# Patient Record
Sex: FEMALE | Race: WHITE | NOT HISPANIC OR LATINO | URBAN - METROPOLITAN AREA
[De-identification: names, ages, dates, MRNs, and addresses within clinical notes are randomized per-mention and may not be internally consistent; named-entity substitution may affect disease eponyms.]

---

## 2017-05-01 ENCOUNTER — OUTPATIENT (OUTPATIENT)
Dept: OUTPATIENT SERVICES | Facility: HOSPITAL | Age: 62
LOS: 1 days | Discharge: HOME | End: 2017-05-01
Payer: COMMERCIAL

## 2017-05-01 DIAGNOSIS — C50.419 MALIGNANT NEOPLASM OF UPPER-OUTER QUADRANT OF UNSPECIFIED FEMALE BREAST: ICD-10-CM

## 2017-05-01 DIAGNOSIS — Z98.890 OTHER SPECIFIED POSTPROCEDURAL STATES: Chronic | ICD-10-CM

## 2017-05-01 DIAGNOSIS — Z90.11 ACQUIRED ABSENCE OF RIGHT BREAST AND NIPPLE: Chronic | ICD-10-CM

## 2017-05-01 PROCEDURE — 93010 ELECTROCARDIOGRAM REPORT: CPT

## 2017-05-01 NOTE — CHART NOTE - NSCHARTNOTEFT_GEN_A_CORE
Ht 70 inches   wt 94.6 kg    BP left arm sitting 136/48  HR 68 bpm  Stefania 97.7F  RR 14/min  O2 sat 100% on RA

## 2017-05-01 NOTE — ASU PATIENT PROFILE, ADULT - PSH
H/O cardiac radiofrequency ablation    H/O right breast biopsy  2017  History of lumpectomy of right breast  benign -2003  S/P colonoscopy

## 2017-05-01 NOTE — ASU PATIENT PROFILE, ADULT - PMH
Breast lump  benign  Breast pain, right    Breast skin changes  right breast  Essential hypertension    Malignant neoplasm of right female breast, unspecified site of breast    Microscopic hematuria    Osteoarthritis  knees  Rosacea    SVT (supraventricular tachycardia)

## 2017-05-03 ENCOUNTER — RESULT REVIEW (OUTPATIENT)
Age: 62
End: 2017-05-03

## 2017-05-03 ENCOUNTER — OUTPATIENT (OUTPATIENT)
Dept: OUTPATIENT SERVICES | Facility: HOSPITAL | Age: 62
LOS: 1 days | Discharge: HOME | End: 2017-05-03
Payer: COMMERCIAL

## 2017-05-03 DIAGNOSIS — Z98.890 OTHER SPECIFIED POSTPROCEDURAL STATES: Chronic | ICD-10-CM

## 2017-05-03 DIAGNOSIS — Z90.11 ACQUIRED ABSENCE OF RIGHT BREAST AND NIPPLE: Chronic | ICD-10-CM

## 2017-05-03 DIAGNOSIS — C50.419 MALIGNANT NEOPLASM OF UPPER-OUTER QUADRANT OF UNSPECIFIED FEMALE BREAST: ICD-10-CM

## 2017-05-03 PROCEDURE — 88321 CONSLTJ&REPRT SLD PREP ELSWR: CPT

## 2017-05-03 RX ORDER — FAMOTIDINE 10 MG/ML
20 INJECTION INTRAVENOUS ONCE
Qty: 0 | Refills: 0 | Status: COMPLETED | OUTPATIENT
Start: 2017-05-04 | End: 2017-05-04

## 2017-05-03 RX ORDER — CELECOXIB 200 MG/1
200 CAPSULE ORAL ONCE
Qty: 0 | Refills: 0 | Status: DISCONTINUED | OUTPATIENT
Start: 2017-05-04 | End: 2017-05-04

## 2017-05-03 RX ORDER — ACETAMINOPHEN 500 MG
975 TABLET ORAL ONCE
Qty: 0 | Refills: 0 | Status: DISCONTINUED | OUTPATIENT
Start: 2017-05-04 | End: 2017-05-04

## 2017-05-03 RX ORDER — OXYCODONE HYDROCHLORIDE 5 MG/1
10 TABLET ORAL ONCE
Qty: 0 | Refills: 0 | Status: DISCONTINUED | OUTPATIENT
Start: 2017-05-04 | End: 2017-05-04

## 2017-05-03 RX ORDER — SODIUM CHLORIDE 9 MG/ML
3 INJECTION INTRAMUSCULAR; INTRAVENOUS; SUBCUTANEOUS EVERY 8 HOURS
Qty: 0 | Refills: 0 | Status: DISCONTINUED | OUTPATIENT
Start: 2017-05-04 | End: 2017-05-19

## 2017-05-04 ENCOUNTER — RESULT REVIEW (OUTPATIENT)
Age: 62
End: 2017-05-04

## 2017-05-04 ENCOUNTER — OUTPATIENT (OUTPATIENT)
Dept: OUTPATIENT SERVICES | Facility: HOSPITAL | Age: 62
LOS: 1 days | Discharge: HOME | End: 2017-05-04
Payer: COMMERCIAL

## 2017-05-04 VITALS
WEIGHT: 208.56 LBS | DIASTOLIC BLOOD PRESSURE: 68 MMHG | SYSTOLIC BLOOD PRESSURE: 138 MMHG | OXYGEN SATURATION: 98 % | RESPIRATION RATE: 15 BRPM | TEMPERATURE: 98 F | HEART RATE: 68 BPM

## 2017-05-04 VITALS
RESPIRATION RATE: 19 BRPM | DIASTOLIC BLOOD PRESSURE: 50 MMHG | HEART RATE: 75 BPM | SYSTOLIC BLOOD PRESSURE: 146 MMHG | OXYGEN SATURATION: 98 %

## 2017-05-04 DIAGNOSIS — Z98.890 OTHER SPECIFIED POSTPROCEDURAL STATES: Chronic | ICD-10-CM

## 2017-05-04 DIAGNOSIS — Z90.11 ACQUIRED ABSENCE OF RIGHT BREAST AND NIPPLE: Chronic | ICD-10-CM

## 2017-05-04 LAB — SURGICAL PATHOLOGY FINAL REPORT - CH: SIGNIFICANT CHANGE UP

## 2017-05-04 PROCEDURE — 88307 TISSUE EXAM BY PATHOLOGIST: CPT | Mod: 26

## 2017-05-04 PROCEDURE — 88341 IMHCHEM/IMCYTCHM EA ADD ANTB: CPT | Mod: 26,59

## 2017-05-04 PROCEDURE — 88360 TUMOR IMMUNOHISTOCHEM/MANUAL: CPT | Mod: 26

## 2017-05-04 PROCEDURE — 88342 IMHCHEM/IMCYTCHM 1ST ANTB: CPT | Mod: 26,59

## 2017-05-04 PROCEDURE — 88305 TISSUE EXAM BY PATHOLOGIST: CPT | Mod: 26

## 2017-05-04 PROCEDURE — 78195 LYMPH SYSTEM IMAGING: CPT | Mod: 26

## 2017-05-04 PROCEDURE — 88329 PATH CONSLTJ DRG SURG: CPT

## 2017-05-04 PROCEDURE — 76098 X-RAY EXAM SURGICAL SPECIMEN: CPT | Mod: 26

## 2017-05-04 PROCEDURE — 19281 PERQ DEVICE BREAST 1ST IMAG: CPT | Mod: RT

## 2017-05-04 RX ORDER — MEPERIDINE HYDROCHLORIDE 50 MG/ML
12.5 INJECTION INTRAMUSCULAR; INTRAVENOUS; SUBCUTANEOUS
Qty: 0 | Refills: 0 | Status: DISCONTINUED | OUTPATIENT
Start: 2017-05-04 | End: 2017-05-04

## 2017-05-04 RX ORDER — OXYCODONE HYDROCHLORIDE 5 MG/1
5 TABLET ORAL EVERY 4 HOURS
Qty: 0 | Refills: 0 | Status: DISCONTINUED | OUTPATIENT
Start: 2017-05-04 | End: 2017-05-04

## 2017-05-04 RX ORDER — FENTANYL CITRATE 50 UG/ML
50 INJECTION INTRAVENOUS
Qty: 0 | Refills: 0 | Status: DISCONTINUED | OUTPATIENT
Start: 2017-05-04 | End: 2017-05-04

## 2017-05-04 RX ORDER — ONDANSETRON 8 MG/1
4 TABLET, FILM COATED ORAL ONCE
Qty: 0 | Refills: 0 | Status: DISCONTINUED | OUTPATIENT
Start: 2017-05-04 | End: 2017-05-04

## 2017-05-04 RX ORDER — SODIUM CHLORIDE 9 MG/ML
1000 INJECTION INTRAMUSCULAR; INTRAVENOUS; SUBCUTANEOUS
Qty: 0 | Refills: 0 | Status: DISCONTINUED | OUTPATIENT
Start: 2017-05-04 | End: 2017-05-04

## 2017-05-04 RX ADMIN — FAMOTIDINE 20 MILLIGRAM(S): 10 INJECTION INTRAVENOUS at 13:28

## 2017-05-04 RX ADMIN — OXYCODONE HYDROCHLORIDE 10 MILLIGRAM(S): 5 TABLET ORAL at 13:28

## 2017-05-04 NOTE — ASU DISCHARGE PLAN (ADULT/PEDIATRIC). - MEDICATION SUMMARY - MEDICATIONS TO TAKE
I will START or STAY ON the medications listed below when I get home from the hospital:    aspirin 81 mg oral tablet  -- 1 tab(s) by mouth once a day  -- Indication: For RIGHT BREAST CANCER    Toprol-XL  -- 75 milligram(s) by mouth once a day  -- Indication: For RIGHT BREAST CANCER

## 2017-05-04 NOTE — BRIEF OPERATIVE NOTE - PROCEDURE
[place] lymph node biopsy  05/04/2017    Active  [email or web address]  Lumpectomy of breast with needle localization  05/04/2017    Active  [email or web address]

## 2017-05-04 NOTE — BRIEF OPERATIVE NOTE - PRE-OP DX
Malignant neoplasm of upper-outer quadrant of right female breast  05/04/2017    Active  Shreyas Fuentes

## 2017-05-10 LAB — SURGICAL PATHOLOGY FINAL REPORT - CH: SIGNIFICANT CHANGE UP

## 2017-05-11 DIAGNOSIS — D05.11 INTRADUCTAL CARCINOMA IN SITU OF RIGHT BREAST: ICD-10-CM

## 2017-05-11 DIAGNOSIS — N60.91 UNSPECIFIED BENIGN MAMMARY DYSPLASIA OF RIGHT BREAST: ICD-10-CM

## 2017-05-11 DIAGNOSIS — Z88.1 ALLERGY STATUS TO OTHER ANTIBIOTIC AGENTS STATUS: ICD-10-CM

## 2017-05-11 DIAGNOSIS — Z79.82 LONG TERM (CURRENT) USE OF ASPIRIN: ICD-10-CM

## 2017-05-11 DIAGNOSIS — I10 ESSENTIAL (PRIMARY) HYPERTENSION: ICD-10-CM

## 2017-05-11 DIAGNOSIS — N60.31 FIBROSCLEROSIS OF RIGHT BREAST: ICD-10-CM

## 2017-05-11 DIAGNOSIS — N60.11 DIFFUSE CYSTIC MASTOPATHY OF RIGHT BREAST: ICD-10-CM

## 2017-05-11 DIAGNOSIS — N60.01 SOLITARY CYST OF RIGHT BREAST: ICD-10-CM

## 2017-05-11 DIAGNOSIS — N60.21 FIBROADENOSIS OF RIGHT BREAST: ICD-10-CM

## 2017-05-11 DIAGNOSIS — N64.1 FAT NECROSIS OF BREAST: ICD-10-CM

## 2017-09-25 ENCOUNTER — EMERGENCY (EMERGENCY)
Facility: HOSPITAL | Age: 62
LOS: 0 days | Discharge: HOME | End: 2017-09-25
Attending: EMERGENCY MEDICINE | Admitting: EMERGENCY MEDICINE
Payer: COMMERCIAL

## 2017-09-25 VITALS
SYSTOLIC BLOOD PRESSURE: 119 MMHG | DIASTOLIC BLOOD PRESSURE: 60 MMHG | HEART RATE: 72 BPM | TEMPERATURE: 98 F | OXYGEN SATURATION: 99 % | RESPIRATION RATE: 18 BRPM

## 2017-09-25 VITALS — WEIGHT: 173.94 LBS | HEIGHT: 71 IN

## 2017-09-25 DIAGNOSIS — Z90.11 ACQUIRED ABSENCE OF RIGHT BREAST AND NIPPLE: Chronic | ICD-10-CM

## 2017-09-25 DIAGNOSIS — Z98.890 OTHER SPECIFIED POSTPROCEDURAL STATES: Chronic | ICD-10-CM

## 2017-09-25 DIAGNOSIS — I82.4Z1 ACUTE EMBOLISM AND THROMBOSIS OF UNSPECIFIED DEEP VEINS OF RIGHT DISTAL LOWER EXTREMITY: ICD-10-CM

## 2017-09-25 DIAGNOSIS — R60.0 LOCALIZED EDEMA: ICD-10-CM

## 2017-09-25 DIAGNOSIS — L53.9 ERYTHEMATOUS CONDITION, UNSPECIFIED: ICD-10-CM

## 2017-09-25 LAB
ALBUMIN SERPL ELPH-MCNC: 3.6 G/DL — SIGNIFICANT CHANGE UP (ref 3.3–5)
ALP SERPL-CCNC: 107 U/L — SIGNIFICANT CHANGE UP (ref 40–120)
ALT FLD-CCNC: 13 U/L — SIGNIFICANT CHANGE UP (ref 12–78)
ANION GAP SERPL CALC-SCNC: 5 MMOL/L — SIGNIFICANT CHANGE UP (ref 5–17)
ANISOCYTOSIS BLD QL: SLIGHT — SIGNIFICANT CHANGE UP
APTT BLD: 27.5 SEC — SIGNIFICANT CHANGE UP (ref 27.5–37.4)
AST SERPL-CCNC: 17 U/L — SIGNIFICANT CHANGE UP (ref 15–37)
BASOPHILS # BLD AUTO: 0 K/UL — SIGNIFICANT CHANGE UP (ref 0–0.2)
BASOPHILS NFR BLD AUTO: 1.4 % — SIGNIFICANT CHANGE UP (ref 0–2)
BILIRUB SERPL-MCNC: 0.3 MG/DL — SIGNIFICANT CHANGE UP (ref 0.2–1.2)
BUN SERPL-MCNC: 20 MG/DL — SIGNIFICANT CHANGE UP (ref 7–23)
CALCIUM SERPL-MCNC: 8.5 MG/DL — SIGNIFICANT CHANGE UP (ref 8.5–10.1)
CHLORIDE SERPL-SCNC: 106 MMOL/L — SIGNIFICANT CHANGE UP (ref 96–108)
CO2 SERPL-SCNC: 29 MMOL/L — SIGNIFICANT CHANGE UP (ref 22–31)
CREAT SERPL-MCNC: 0.89 MG/DL — SIGNIFICANT CHANGE UP (ref 0.5–1.3)
EOSINOPHIL # BLD AUTO: 0.1 K/UL — SIGNIFICANT CHANGE UP (ref 0–0.5)
EOSINOPHIL NFR BLD AUTO: 4 % — SIGNIFICANT CHANGE UP (ref 0–6)
GLUCOSE SERPL-MCNC: 122 MG/DL — HIGH (ref 70–99)
HCT VFR BLD CALC: 29.1 % — LOW (ref 34.5–45)
HGB BLD-MCNC: 9.4 G/DL — LOW (ref 11.5–15.5)
HYPOCHROMIA BLD QL: SLIGHT — SIGNIFICANT CHANGE UP
INR BLD: 1.05 RATIO — SIGNIFICANT CHANGE UP (ref 0.88–1.16)
LG PLATELETS BLD QL AUTO: SLIGHT — SIGNIFICANT CHANGE UP
LYMPHOCYTES # BLD AUTO: 0.8 K/UL — LOW (ref 1–3.3)
LYMPHOCYTES # BLD AUTO: 24.4 % — SIGNIFICANT CHANGE UP (ref 13–44)
MANUAL DIF COMMENT BLD-IMP: SIGNIFICANT CHANGE UP
MCHC RBC-ENTMCNC: 30.5 PG — SIGNIFICANT CHANGE UP (ref 27–34)
MCHC RBC-ENTMCNC: 32.2 GM/DL — SIGNIFICANT CHANGE UP (ref 32–36)
MCV RBC AUTO: 94.7 FL — SIGNIFICANT CHANGE UP (ref 80–100)
MONOCYTES # BLD AUTO: 0.3 K/UL — SIGNIFICANT CHANGE UP (ref 0–0.9)
MONOCYTES NFR BLD AUTO: 8.9 % — SIGNIFICANT CHANGE UP (ref 2–14)
NEUTROPHILS # BLD AUTO: 2.1 K/UL — SIGNIFICANT CHANGE UP (ref 1.8–7.4)
NEUTROPHILS NFR BLD AUTO: 61.3 % — SIGNIFICANT CHANGE UP (ref 43–77)
PLAT MORPH BLD: NORMAL — SIGNIFICANT CHANGE UP
PLATELET # BLD AUTO: 173 K/UL — SIGNIFICANT CHANGE UP (ref 150–400)
POTASSIUM SERPL-MCNC: 3.5 MMOL/L — SIGNIFICANT CHANGE UP (ref 3.5–5.3)
POTASSIUM SERPL-SCNC: 3.5 MMOL/L — SIGNIFICANT CHANGE UP (ref 3.5–5.3)
PROT SERPL-MCNC: 6.3 GM/DL — SIGNIFICANT CHANGE UP (ref 6–8.3)
PROTHROM AB SERPL-ACNC: 11.3 SEC — SIGNIFICANT CHANGE UP (ref 9.8–12.7)
RBC # BLD: 3.07 M/UL — LOW (ref 3.8–5.2)
RBC # FLD: 14.4 % — SIGNIFICANT CHANGE UP (ref 10.3–14.5)
RBC BLD AUTO: (no result)
SODIUM SERPL-SCNC: 140 MMOL/L — SIGNIFICANT CHANGE UP (ref 135–145)
WBC # BLD: 3.5 K/UL — LOW (ref 3.8–10.5)
WBC # FLD AUTO: 3.5 K/UL — LOW (ref 3.8–10.5)

## 2017-09-25 PROCEDURE — 93010 ELECTROCARDIOGRAM REPORT: CPT

## 2017-09-25 PROCEDURE — 99285 EMERGENCY DEPT VISIT HI MDM: CPT

## 2017-09-25 RX ORDER — RIVAROXABAN 15 MG-20MG
1 KIT ORAL
Qty: 42 | Refills: 0
Start: 2017-09-25 | End: 2017-10-16

## 2017-09-25 RX ORDER — RIVAROXABAN 15 MG-20MG
20 KIT ORAL ONCE
Qty: 0 | Refills: 0 | Status: COMPLETED | OUTPATIENT
Start: 2017-09-25 | End: 2017-09-25

## 2017-09-25 RX ADMIN — RIVAROXABAN 20 MILLIGRAM(S): KIT at 20:13

## 2017-09-25 NOTE — ED STATDOCS - PROGRESS NOTE DETAILS
Mary Thompson on behalf of Attending Dr. Madden. 61 y/o F with PMHx of breast CA sent to the ED by Dr. Justice for blood clots seen in the right leg after CT scan was done. Pt noted swelling to RLE since last night. Pt will be sent to the Main ED for further evaluation. Mary Thompson on behalf of Attending Dr. Madden. 63 y/o F with PMHx of breast CA sent to the ED by Dr. Justice for blood clots seen in the right leg after US was done. Pt noted swelling to RLE since last night. Pt will be sent to the Main ED for further evaluation.

## 2017-09-25 NOTE — ED ADULT NURSE NOTE - CHPI ED SYMPTOMS NEG
no weakness/no tingling/no fever/no chills/no decreased eating/drinking/no dizziness/no numbness/no vomiting/no nausea

## 2017-09-25 NOTE — ED PROVIDER NOTE - OBJECTIVE STATEMENT
61 y/o female pmhx breast cancer presents to ED for eval of RLE DVT. Pt has had leg pain x2 days with swelling and redness around the right calf. Pt had an out pt sonogram, remarkable for DVT in the RLE. She visited hematologist who advised her to come to come ot the ER for an evaluation. Denies cp, sob.

## 2017-09-25 NOTE — ED ADULT TRIAGE NOTE - CHIEF COMPLAINT QUOTE
Pt sent in by Dr Justice for a blood clot in right leg.   pt had sono done today. denies any recent travel. no sob

## 2017-09-25 NOTE — ED PROVIDER NOTE - MEDICAL DECISION MAKING DETAILS
case discussed with Dr Justice, patient appropriate for discharge home on Xarelto with outpatient follow up. patient without chest pain or shortness of breath. appropriate for discharge home. patient understands that she will receive 3 week supply of anticoagulants and continue treatment with PMD for at least 3 months.

## 2017-09-25 NOTE — ED ADULT NURSE NOTE - OBJECTIVE STATEMENT
Pt presented to ED for LE blood clots. As per pt, pt's experiencing swelling and tenderness to LLE x 2 days. Pt went to 's office today where sono confirmed blood clots and suggested to come to ER. Upon arrival to ED, pt's exhibiting redness, warmth, and tenderness to LLE around medial calf regions. Denies any other symptoms at this time.

## 2017-09-25 NOTE — ED STATDOCS - NS_ ATTENDINGSCRIBEDETAILS _ED_A_ED_FT
I, Lucila Madden MD, performed the initial face to face bedside interview with this patient regarding history of present illness and determined that the patient should be seen in the main ED.  The history, was documented by the scribe in my presence and I attest to the accuracy of the documentation.

## 2019-05-04 ENCOUNTER — RESULT REVIEW (OUTPATIENT)
Age: 64
End: 2019-05-04

## 2019-11-08 PROBLEM — N64.4 MASTODYNIA: Chronic | Status: ACTIVE | Noted: 2017-05-01

## 2019-11-08 PROBLEM — L71.9 ROSACEA, UNSPECIFIED: Chronic | Status: ACTIVE | Noted: 2017-05-01

## 2019-11-08 PROBLEM — I47.1 SUPRAVENTRICULAR TACHYCARDIA: Chronic | Status: ACTIVE | Noted: 2017-05-01

## 2019-11-08 PROBLEM — R23.4 CHANGES IN SKIN TEXTURE: Chronic | Status: ACTIVE | Noted: 2017-05-01

## 2019-11-08 PROBLEM — R31.29 OTHER MICROSCOPIC HEMATURIA: Chronic | Status: ACTIVE | Noted: 2017-05-01

## 2019-11-08 PROBLEM — C50.911 MALIGNANT NEOPLASM OF UNSPECIFIED SITE OF RIGHT FEMALE BREAST: Chronic | Status: ACTIVE | Noted: 2017-05-01

## 2019-11-08 PROBLEM — M19.90 UNSPECIFIED OSTEOARTHRITIS, UNSPECIFIED SITE: Chronic | Status: ACTIVE | Noted: 2017-05-01

## 2019-11-08 PROBLEM — I10 ESSENTIAL (PRIMARY) HYPERTENSION: Chronic | Status: ACTIVE | Noted: 2017-05-01

## 2020-02-04 ENCOUNTER — APPOINTMENT (OUTPATIENT)
Dept: BREAST CENTER | Facility: CLINIC | Age: 65
End: 2020-02-04
Payer: COMMERCIAL

## 2020-02-04 VITALS
DIASTOLIC BLOOD PRESSURE: 75 MMHG | HEIGHT: 72 IN | WEIGHT: 230 LBS | HEART RATE: 79 BPM | SYSTOLIC BLOOD PRESSURE: 138 MMHG | BODY MASS INDEX: 31.15 KG/M2

## 2020-02-04 DIAGNOSIS — Z92.21 PERSONAL HISTORY OF ANTINEOPLASTIC CHEMOTHERAPY: ICD-10-CM

## 2020-02-04 DIAGNOSIS — Z80.0 FAMILY HISTORY OF MALIGNANT NEOPLASM OF DIGESTIVE ORGANS: ICD-10-CM

## 2020-02-04 DIAGNOSIS — Z92.3 PERSONAL HISTORY OF IRRADIATION: ICD-10-CM

## 2020-02-04 DIAGNOSIS — Z82.49 FAMILY HISTORY OF ISCHEMIC HEART DISEASE AND OTHER DISEASES OF THE CIRCULATORY SYSTEM: ICD-10-CM

## 2020-02-04 DIAGNOSIS — Z82.3 FAMILY HISTORY OF STROKE: ICD-10-CM

## 2020-02-04 DIAGNOSIS — Z86.718 PERSONAL HISTORY OF OTHER VENOUS THROMBOSIS AND EMBOLISM: ICD-10-CM

## 2020-02-04 DIAGNOSIS — Z80.42 FAMILY HISTORY OF MALIGNANT NEOPLASM OF PROSTATE: ICD-10-CM

## 2020-02-04 PROCEDURE — 99244 OFF/OP CNSLTJ NEW/EST MOD 40: CPT

## 2020-02-04 PROCEDURE — 36415 COLL VENOUS BLD VENIPUNCTURE: CPT

## 2020-02-04 NOTE — HISTORY OF PRESENT ILLNESS
[FreeTextEntry1] : This is a 64 year old  female had felt a lump in her right breast for about 9 months at the site of a previous surgical biopsy.  When it was excised, it was found to be cancer.  She had a lumpectomy with sentinel node biopsy by Dr. Fuentes.  It was Her2 + so she then received chemotherapy with Adriamycin at [facility] [place]. She stopped after 3 treatments and transferred her care to Dr. Bingham where she completed additional chemotherapy and 9 months of Herceptin.  She had a DVT during chemotherapy and is on Xarelto. She then went on to radiation with Dr. Luo.  Dr. Luo advised she have follow up with a surgeon. \par \par She is on tamoxifen.\par \par She has occasional discomfort in the lumpectomy area.  She has also has intermittent rib pain and had a PET/CT in the past when she had the complaint that was normal.

## 2020-02-04 NOTE — CONSULT LETTER
[Dear  ___] : Dear  [unfilled], [Consult Letter:] : I had the pleasure of evaluating your patient, [unfilled]. [Sincerely,] : Sincerely, [DrRonny  ___] : Dr. LI

## 2020-02-04 NOTE — DATA REVIEWED
[FreeTextEntry1] : Bilateral mammogram 7/15/2019:  Stable post treatment changes right breast s/p lumpectomy.\par \par Bilateral breast ultrasound 7/15/2019:  Right 9-10:00 lumpectomy scar stable.\par                                                               Left 12:00 9 mm cyst stable\par \par (Images reviewed on the [facility] portal.  The disc was returned to the patient.)

## 2020-02-04 NOTE — PHYSICAL EXAM
[EOMI] : extra ocular movement intact [Supple] : supple [No Supraclavicular Adenopathy] : no supraclavicular adenopathy [Sclera nonicteric] : sclera nonicteric [No Cervical Adenopathy] : no cervical adenopathy [No Thyromegaly] : no thyromegaly [Clear to Auscultation Bilat] : clear to auscultation bilaterally [Normal S1, S2] : normal S1 and S2 [Normal Sinus Rhythm] : normal sinus rhythm [Examined in the supine and seated position] : examined in the supine and seated position [No dominant masses] : no dominant masses in right breast  [No dominant masses] : no dominant masses left breast [No Nipple Retraction] : no left nipple retraction [No Nipple Discharge] : no left nipple discharge [Soft] : abdomen soft [No Axillary Lymphadenopathy] : no left axillary lymphadenopathy [No Palpable Masses] : no abdominal mass palpated [Not Tender] : non-tender [de-identified] : Radial scar 9:00. Mild radiation edema, slight hyperpigmentation.

## 2020-08-07 ENCOUNTER — APPOINTMENT (OUTPATIENT)
Dept: BREAST CENTER | Facility: CLINIC | Age: 65
End: 2020-08-07
Payer: MEDICARE

## 2020-08-07 VITALS
SYSTOLIC BLOOD PRESSURE: 127 MMHG | HEIGHT: 72 IN | WEIGHT: 220 LBS | DIASTOLIC BLOOD PRESSURE: 69 MMHG | HEART RATE: 68 BPM | BODY MASS INDEX: 29.8 KG/M2

## 2020-08-07 PROCEDURE — 99213 OFFICE O/P EST LOW 20 MIN: CPT

## 2020-08-07 RX ORDER — TAMOXIFEN CITRATE 20 MG/1
20 TABLET, FILM COATED ORAL
Refills: 0 | Status: DISCONTINUED | COMMUNITY
End: 2020-08-07

## 2020-08-07 NOTE — PHYSICAL EXAM
[EOMI] : extra ocular movement intact [Sclera nonicteric] : sclera nonicteric [No Cervical Adenopathy] : no cervical adenopathy [Supple] : supple [No Supraclavicular Adenopathy] : no supraclavicular adenopathy [Clear to Auscultation Bilat] : clear to auscultation bilaterally [No Thyromegaly] : no thyromegaly [Normal Sinus Rhythm] : normal sinus rhythm [No dominant masses] : no dominant masses in right breast  [Normal S1, S2] : normal S1 and S2 [Examined in the supine and seated position] : examined in the supine and seated position [No dominant masses] : no dominant masses left breast [No Nipple Retraction] : no left nipple retraction [No Nipple Discharge] : no left nipple discharge [No Axillary Lymphadenopathy] : no left axillary lymphadenopathy [Soft] : abdomen soft [Not Tender] : non-tender [No Palpable Masses] : no abdominal mass palpated [de-identified] : Radial scar 9:00.

## 2020-08-07 NOTE — DATA REVIEWED
[FreeTextEntry1] : Bilateral mammogram 7/21/2020:  Stable post treatment changes right breast s/p lumpectomy.\par \par Bilateral breast ultrasound 7/21/2020:  Right 9-10:00 lumpectomy scar stable.\par                                                               Left 12:00 9 mm cyst stable\par \par (Images reviewed on the [facility] portal.)

## 2020-08-07 NOTE — HISTORY OF PRESENT ILLNESS
[FreeTextEntry1] : This is a 65 year old  female had felt a lump in her right breast for about 9 months at the site of a previous surgical biopsy.  When it was excised, it was found to be cancer.  She had a lumpectomy with sentinel node biopsy by Dr. Fuentes in 5/2017.  It was Her2 + so she then received chemotherapy with Adriamycin at [facility] [place]. She stopped after 3 treatments and transferred her care to Dr. Bingham where she completed additional chemotherapy and 9 months of Herceptin.  She had a DVT during chemotherapy and is on Xarelto. She then went on to radiation with Dr. Luo.  \par \par She is on tamoxifen.\par \par She has occasional discomfort in the lumpectomy area.

## 2020-10-19 ENCOUNTER — RECORD ABSTRACTING (OUTPATIENT)
Age: 65
End: 2020-10-19

## 2020-10-19 RX ORDER — UBIDECARENONE/VIT E ACET 100MG-5
CAPSULE ORAL
Refills: 0 | Status: ACTIVE | COMMUNITY

## 2020-10-19 RX ORDER — VENLAFAXINE HYDROCHLORIDE 150 MG/1
CAPSULE, EXTENDED RELEASE ORAL
Refills: 0 | Status: ACTIVE | COMMUNITY

## 2020-10-19 RX ORDER — BIOTIN 5 MG
5 CAPSULE ORAL
Refills: 0 | Status: ACTIVE | COMMUNITY

## 2020-10-19 RX ORDER — B-COMPLEX WITH VITAMIN C
TABLET ORAL
Refills: 0 | Status: ACTIVE | COMMUNITY

## 2020-11-13 ENCOUNTER — APPOINTMENT (OUTPATIENT)
Dept: RADIATION ONCOLOGY | Facility: CLINIC | Age: 65
End: 2020-11-13
Payer: MEDICARE

## 2020-11-13 VITALS
HEART RATE: 62 BPM | RESPIRATION RATE: 18 BRPM | HEIGHT: 72 IN | BODY MASS INDEX: 31.15 KG/M2 | WEIGHT: 230 LBS | SYSTOLIC BLOOD PRESSURE: 132 MMHG | DIASTOLIC BLOOD PRESSURE: 80 MMHG

## 2020-11-13 PROCEDURE — 99072 ADDL SUPL MATRL&STAF TM PHE: CPT

## 2020-11-13 PROCEDURE — 99214 OFFICE O/P EST MOD 30 MIN: CPT

## 2020-11-13 RX ORDER — LETROZOLE TABLETS 2.5 MG/1
TABLET, FILM COATED ORAL
Refills: 0 | Status: ACTIVE | COMMUNITY

## 2020-11-13 RX ORDER — TAMOXIFEN CITRATE 20 MG/1
20 TABLET, FILM COATED ORAL DAILY
Refills: 0 | Status: DISCONTINUED | COMMUNITY
End: 2020-11-13

## 2020-11-13 NOTE — HISTORY OF PRESENT ILLNESS
[FreeTextEntry1] : The patient is a pleasant 64-year-old female with right breast cancer. She has a history of benign right breast biopsy and noted some thickening and discomfort in the right breast scar from the prior surgery in 2004. Serial mammography was nondiagnostic until April 2017 when mammogram showed a suspicious lesion. A core biopsy was performed confirming malignancy. MRI demonstrated a 3. 2 cm lesion in the right breast with no other suspicious findings. On 5/4/17 she had a lumpectomy and sentinel lymph node biopsy. Pathology confirmed a 3. 7 cm poorly differentiated invasive ductal carcinoma SBR score 8/9. There was one negative sentinel lymph node and lymphovascular space invasion was identified. ER was positive 70-80% ID 50% YBN0her 3+ positive. Margins were less than 1mm for both invasive carcinoma and DCIS. She started chemotherapy with dose dense Adriamycin and Cytoxan x 3 which was very poorly tolerated and discontinued. Following a long break she sought a second opinion and was diagnosed with a right DVT and placed on Xarelto. When she recovered she began Taxol and Herceptin followed by single agent Herceptin. Her last dose of Herceptin was delivered December 2018. Mammogram and sonogram in July 2018 were negative. She started Letrozole but developed severe joint pains and switched  to Exemestane. She was treated with radiotherapy to the right breast. She received a dose of 4240 cGy in 16 fractions followed by a 1000 cGy tumor bed boost completed 4/1/19.\par \par She presents for one year 7 month followup. She is currently taking Letrozole. She had tried Tamoxifen and Exemestane but they caused joint pains. She continues to report some joint pains, although improved. She sees rheumatology.  Mammography and breast sonogram were performed in July 2020 and were negative. She completed one year of Herceptin. She is trying to lose weight. She has been out of work since [employer] closed her department. She has slight swelling of the right hand.\par \par

## 2020-11-13 NOTE — PHYSICAL EXAM
[Normal] : oriented to person, place and time, the affect was normal, the mood was normal and not anxious [de-identified] : mild edema right hand. assymetic with right breast smaller than right.

## 2020-11-13 NOTE — REVIEW OF SYSTEMS
[Patient Intake Form Reviewed] : Patient intake form was reviewed [Negative] : Allergic/Immunologic [Pruritus: Grade 0] : Pruritus: Grade 0 [Skin Hyperpigmentation: Grade 0] : Skin Hyperpigmentation: Grade 0 [Skin Induration: Grade 0] : Skin Induration: Grade 0 [Dermatitis Radiation: Grade 0] : Dermatitis Radiation: Grade 0

## 2021-02-10 ENCOUNTER — APPOINTMENT (OUTPATIENT)
Dept: BREAST CENTER | Facility: CLINIC | Age: 66
End: 2021-02-10
Payer: MEDICARE

## 2021-02-10 VITALS
BODY MASS INDEX: 31.15 KG/M2 | SYSTOLIC BLOOD PRESSURE: 138 MMHG | WEIGHT: 230 LBS | DIASTOLIC BLOOD PRESSURE: 59 MMHG | HEIGHT: 72 IN

## 2021-02-10 DIAGNOSIS — I89.0 LYMPHEDEMA, NOT ELSEWHERE CLASSIFIED: ICD-10-CM

## 2021-02-10 PROCEDURE — 99213 OFFICE O/P EST LOW 20 MIN: CPT

## 2021-02-10 PROCEDURE — 99072 ADDL SUPL MATRL&STAF TM PHE: CPT

## 2021-02-10 NOTE — HISTORY OF PRESENT ILLNESS
[FreeTextEntry1] : This is a 65 year old  female had felt a lump in her right breast for about 9 months at the site of a previous surgical biopsy.  When it was excised, it was found to be cancer.  She had a lumpectomy with sentinel node biopsy by Dr. Fuentes in 5/2017. She had a 3.7 cm invasive cancer, SLNx1 neg, ER+CA-Her 2 3+ tumor.  [place] [facility] [place] She stopped after 3 treatments and transferred her care to Dr. Bingham where she completed additional chemotherapy and 9 months of Herceptin.  She had a DVT during chemotherapy and is on Xarelto. She then went on to radiation with Dr. Luo.  \par \par She is on letrozole.\par \par She complains of slight swelling of her right hand that she notices in the morning.  It goes away later in the day.

## 2021-02-10 NOTE — DATA REVIEWED
[FreeTextEntry1] : Bilateral mammogram 7/21/2020:  Stable post treatment changes right breast s/p lumpectomy.\par \par Bilateral breast ultrasound 7/21/2020:  Right 9-10:00 lumpectomy scar stable.\par                                                               Left 12:00 9 mm cyst stable\par \par

## 2021-02-10 NOTE — PHYSICAL EXAM
[EOMI] : extra ocular movement intact [Sclera nonicteric] : sclera nonicteric [Supple] : supple [No Supraclavicular Adenopathy] : no supraclavicular adenopathy [No Cervical Adenopathy] : no cervical adenopathy [No Thyromegaly] : no thyromegaly [Clear to Auscultation Bilat] : clear to auscultation bilaterally [Normal Sinus Rhythm] : normal sinus rhythm [Normal S1, S2] : normal S1 and S2 [Examined in the supine and seated position] : examined in the supine and seated position [No dominant masses] : no dominant masses in right breast  [No dominant masses] : no dominant masses left breast [No Nipple Retraction] : no left nipple retraction [No Nipple Discharge] : no left nipple discharge [No Axillary Lymphadenopathy] : no left axillary lymphadenopathy [Soft] : abdomen soft [Not Tender] : non-tender [No Palpable Masses] : no abdominal mass palpated [de-identified] : Radial scar 9:00. [de-identified] : Wrist:  R=17/L=17;  below elbow R=29/L=26;  above elbow R=32/L=30

## 2021-02-26 ENCOUNTER — APPOINTMENT (OUTPATIENT)
Dept: BREAST CENTER | Facility: CLINIC | Age: 66
End: 2021-02-26
Payer: MEDICARE

## 2021-02-26 VITALS
WEIGHT: 230 LBS | BODY MASS INDEX: 31.15 KG/M2 | SYSTOLIC BLOOD PRESSURE: 161 MMHG | DIASTOLIC BLOOD PRESSURE: 81 MMHG | HEART RATE: 72 BPM | HEIGHT: 72 IN

## 2021-02-26 PROCEDURE — 76642 ULTRASOUND BREAST LIMITED: CPT

## 2021-02-26 PROCEDURE — 99072 ADDL SUPL MATRL&STAF TM PHE: CPT

## 2021-02-26 PROCEDURE — 99213 OFFICE O/P EST LOW 20 MIN: CPT

## 2021-02-26 NOTE — PHYSICAL EXAM
[EOMI] : extra ocular movement intact [Sclera nonicteric] : sclera nonicteric [No Cervical Adenopathy] : no cervical adenopathy [Examined in the supine and seated position] : examined in the supine and seated position [No dominant masses] : no dominant masses in right breast  [No dominant masses] : no dominant masses left breast [No Nipple Retraction] : no left nipple retraction [No Nipple Discharge] : no left nipple discharge [No Axillary Lymphadenopathy] : no left axillary lymphadenopathy [de-identified] : Radial scar 9:00. Mild erythema/hyperpigmentation upper breast.\par  Petechial-like appearance to entire breast with some yellow discoloration in skin.  3 cm soft fullness central breast.

## 2021-02-26 NOTE — HISTORY OF PRESENT ILLNESS
[FreeTextEntry1] : This is a 65 year old  female had felt a lump in her right breast for about 9 months at the site of a previous surgical biopsy.  When it was excised, it was found to be cancer.  She had a lumpectomy with sentinel node biopsy by Dr. Fuentes in 5/2017. She had a 3.7 cm invasive cancer, SLNx1 neg, ER+IA-Her 2 3+ tumor.  [place] [facility] [place] stopped after 3 treatments and transferred her care to Dr. Bingham where she completed additional chemotherapy and 9 months of Herceptin.  She had a DVT during chemotherapy and is on Xarelto. She then went on to radiation with Dr. Luo.  \par \par She is on letrozole.\par \par \par She was here for a routine visit on Feb 10.\par \par She called that she noticed a purplish rash on her right breast that started last night.  She doesn't recall any trauma, but her cats sometimes jump on her during the night.  She also carried a heavy case of water about 3 days ago.  She hasn't had any fever.

## 2021-02-26 NOTE — PROCEDURE
[FreeTextEntry1] : Right breast ultrasound [FreeTextEntry3] : There is a [id number] mm heterogeneous hypoechoic/hyperechoic mass centrally and adjacent 10x12 mm hypoechoic mass at 1:00.

## 2021-03-08 ENCOUNTER — APPOINTMENT (OUTPATIENT)
Dept: BREAST CENTER | Facility: CLINIC | Age: 66
End: 2021-03-08
Payer: MEDICARE

## 2021-03-08 VITALS
HEART RATE: 81 BPM | DIASTOLIC BLOOD PRESSURE: 66 MMHG | HEIGHT: 71 IN | SYSTOLIC BLOOD PRESSURE: 138 MMHG | BODY MASS INDEX: 32.2 KG/M2 | WEIGHT: 230 LBS

## 2021-03-08 DIAGNOSIS — S20.01XD: ICD-10-CM

## 2021-03-08 PROCEDURE — 99072 ADDL SUPL MATRL&STAF TM PHE: CPT

## 2021-03-08 PROCEDURE — 99212 OFFICE O/P EST SF 10 MIN: CPT

## 2021-03-08 RX ORDER — CEPHALEXIN 250 MG/1
250 TABLET ORAL EVERY 6 HOURS
Qty: 28 | Refills: 0 | Status: DISCONTINUED | COMMUNITY
Start: 2021-02-26 | End: 2021-03-08

## 2021-03-08 NOTE — PHYSICAL EXAM
[EOMI] : extra ocular movement intact [Sclera nonicteric] : sclera nonicteric [No Cervical Adenopathy] : no cervical adenopathy [Examined in the supine and seated position] : examined in the supine and seated position [No dominant masses] : no dominant masses in right breast  [No dominant masses] : no dominant masses left breast [No Nipple Retraction] : no left nipple retraction [No Nipple Discharge] : no left nipple discharge [No Axillary Lymphadenopathy] : no left axillary lymphadenopathy [de-identified] : Radial scar 9:00. Previous skin changes resolved.  No erythema.

## 2021-08-18 ENCOUNTER — APPOINTMENT (OUTPATIENT)
Dept: BREAST CENTER | Facility: CLINIC | Age: 66
End: 2021-08-18
Payer: MEDICARE

## 2021-08-18 VITALS
SYSTOLIC BLOOD PRESSURE: 132 MMHG | BODY MASS INDEX: 32.2 KG/M2 | HEART RATE: 79 BPM | DIASTOLIC BLOOD PRESSURE: 66 MMHG | WEIGHT: 230 LBS | HEIGHT: 71 IN

## 2021-08-18 PROCEDURE — 99212 OFFICE O/P EST SF 10 MIN: CPT

## 2021-08-18 RX ORDER — CEPHALEXIN 250 MG/1
250 CAPSULE ORAL
Qty: 28 | Refills: 0 | Status: DISCONTINUED | COMMUNITY
Start: 2021-03-01 | End: 2021-08-18

## 2021-08-18 RX ORDER — AMOXICILLIN 875 MG/1
875 TABLET, FILM COATED ORAL
Qty: 20 | Refills: 0 | Status: DISCONTINUED | COMMUNITY
Start: 2021-01-22 | End: 2021-08-18

## 2021-08-18 RX ORDER — CEPHALEXIN 250 MG/1
250 TABLET ORAL EVERY 6 HOURS
Qty: 28 | Refills: 0 | Status: DISCONTINUED | COMMUNITY
Start: 2021-03-01 | End: 2021-08-18

## 2021-08-18 NOTE — HISTORY OF PRESENT ILLNESS
[FreeTextEntry1] : This is a 66 year old  female had felt a lump in her right breast for about 9 months at the site of a previous surgical biopsy.  When it was excised, it was found to be cancer.  She had a lumpectomy with sentinel node biopsy by Dr. Fuentes in 5/2017. She had a 3.7 cm invasive cancer, SLNx1 neg, ER+IA-Her 2 3+ tumor.  She then received chemotherapy with [place] at [facility] [place] She stopped after 3 treatments and transferred her care to Dr. Bingham where she completed additional chemotherapy and 9 months of Herceptin.  She had a DVT during chemotherapy and is on Xarelto. She then went on to radiation with Dr. Luo.  \par \par She is on letrozole.\par \par She was here for a routine visit on Feb 10.  She was then seen on Feb 26.  She noticed a purplish rash on her right breast that started the night before.  She didn't recall any trauma, but her cats sometimes jump on her during the night.  She also carried a heavy case of water about 3 days prior.  She hadn't had any fever. Her breast had petechial discolorations with some yellowish color c/w bruising.  There may have bee some erythema and she was also started on Keflex. It looked much better by the day after our visit. It has fully resolved.\par \par She sometimes notices some slight swelling in her right hand and arm.

## 2021-08-18 NOTE — PHYSICAL EXAM
[EOMI] : extra ocular movement intact [Sclera nonicteric] : sclera nonicteric [No Cervical Adenopathy] : no cervical adenopathy [Examined in the supine and seated position] : examined in the supine and seated position [No dominant masses] : no dominant masses in right breast  [No dominant masses] : no dominant masses left breast [No Nipple Retraction] : no left nipple retraction [No Nipple Discharge] : no right nipple discharge [No Axillary Lymphadenopathy] : no left axillary lymphadenopathy [Supple] : supple [No Supraclavicular Adenopathy] : no supraclavicular adenopathy [Clear to Auscultation Bilat] : clear to auscultation bilaterally [Asymmetrical] : asymmetrical [No Edema] : no edema [de-identified] : Right breast smaller than left. [de-identified] : Radial scar 9:00. Mild posttreatment edema.

## 2021-08-18 NOTE — DATA REVIEWED
[FreeTextEntry1] : Bilateral mammogram 7/22/2021:  Stable post treatment changes right breast s/p lumpectomy.\par \par Bilateral breast ultrasound 7/22/2021:  Right 9-10:00 lumpectomy scar stable.\par                                                               Left 12:00 7 mm cyst stable\par \par (Images reviewed and returned to the patient.)

## 2021-11-12 ENCOUNTER — APPOINTMENT (OUTPATIENT)
Dept: RADIATION ONCOLOGY | Facility: CLINIC | Age: 66
End: 2021-11-12
Payer: MEDICARE

## 2021-11-12 VITALS
HEIGHT: 71 IN | DIASTOLIC BLOOD PRESSURE: 72 MMHG | WEIGHT: 232 LBS | SYSTOLIC BLOOD PRESSURE: 112 MMHG | BODY MASS INDEX: 32.48 KG/M2

## 2021-11-12 DIAGNOSIS — Z28.21 IMMUNIZATION NOT CARRIED OUT BECAUSE OF PATIENT REFUSAL: ICD-10-CM

## 2021-11-12 PROCEDURE — 99214 OFFICE O/P EST MOD 30 MIN: CPT | Mod: 25

## 2021-11-12 NOTE — HISTORY OF PRESENT ILLNESS
[FreeTextEntry1] : The patient is a pleasant 66-year-old female with right breast cancer. She has a history of benign right breast biopsy and noted some thickening and discomfort in the right breast scar from the prior surgery in 2004. Serial mammography was nondiagnostic until April 2017 when mammogram showed a suspicious lesion. A core biopsy was performed confirming malignancy. MRI demonstrated a 3. 2 cm lesion in the right breast with no other suspicious findings. On 5/4/17 she had a lumpectomy and sentinel lymph node biopsy. Pathology confirmed a 3. 7 cm poorly differentiated invasive ductal carcinoma SBR score 8/9. There was one negative sentinel lymph node and lymphovascular space invasion was identified. ER was positive 70-80% IN 50% NCL8rig 3+ positive. Margins were less than 1mm for both invasive carcinoma and DCIS. She started chemotherapy with dose dense Adriamycin and Cytoxan x 3 which was very poorly tolerated and discontinued. Following a long break she sought a second opinion and was diagnosed with a right DVT and placed on Xarelto. When she recovered she began Taxol and Herceptin followed by single agent Herceptin. Her last dose of Herceptin was delivered December 2018. Mammogram and sonogram in July 2018 were negative. She started Letrozole but developed severe joint pains and switched  to Exemestane. She was treated with radiotherapy to the right breast. She received a dose of 4240 cGy in 16 fractions followed by a 1000 cGy tumor bed boost completed 4/1/19.\par \par She presents for one year 2 year 7 month followup. She is currently taking Letrozole. She had tried Tamoxifen and Exemestane but they caused joint pains. She continues to report some joint pains, particularly in the knees, although improved. Mammography and breast sonogram were performed in July 2020 and were negative. Saw Dr. Teague in February 2021 for right breast redness. She was placed on antibiotics and redness resolved. Mammo/sono on 7/22/21 were negative for malignancy. She completed one year of Herceptin. Right breast itching began about a month ago and just resolved last week. She is trying to lose weight. She was out of work  for 1 /12 year since [employer] closed her department but now is working at a [employer] as a . She has slight swelling of the right hand. She was referred for PT by Dr. Teague but declined. She declined the COVID vaccine for L[employer] day reasons.\par \par

## 2021-11-12 NOTE — PHYSICAL EXAM
[Breast Appearance] : normal in appearance [Symmetric] : breasts are symmetric [Breast Palpation Mass] : no palpable masses [Breast Abnormal Lactation (Galactorrhea)] : no nipple discharge [No UE Edema] : there is no upper extremity edema [Normal] : normal skin color and pigmentation and no rash

## 2021-11-12 NOTE — VITALS
[Maximal Pain Intensity: 2/10] : 2/10 [Least Pain Intensity: 0/10] : 0/10 [80: Normal activity with effort; some signs or symptoms of disease.] : 80: Normal activity with effort; some signs or symptoms of disease.

## 2022-03-09 ENCOUNTER — APPOINTMENT (OUTPATIENT)
Dept: BREAST CENTER | Facility: CLINIC | Age: 67
End: 2022-03-09
Payer: MEDICARE

## 2022-03-09 VITALS
HEIGHT: 71 IN | WEIGHT: 232 LBS | HEART RATE: 95 BPM | DIASTOLIC BLOOD PRESSURE: 77 MMHG | SYSTOLIC BLOOD PRESSURE: 155 MMHG | BODY MASS INDEX: 32.48 KG/M2

## 2022-03-09 PROCEDURE — 99213 OFFICE O/P EST LOW 20 MIN: CPT

## 2022-03-09 RX ORDER — SILVER SULFADIAZINE 10 MG/G
1 CREAM TOPICAL
Qty: 50 | Refills: 0 | Status: DISCONTINUED | COMMUNITY
Start: 2021-01-09 | End: 2022-03-09

## 2022-03-09 NOTE — PHYSICAL EXAM
[EOMI] : extra ocular movement intact [Sclera nonicteric] : sclera nonicteric [Supple] : supple [No Supraclavicular Adenopathy] : no supraclavicular adenopathy [No Cervical Adenopathy] : no cervical adenopathy [Clear to Auscultation Bilat] : clear to auscultation bilaterally [Examined in the supine and seated position] : examined in the supine and seated position [Asymmetrical] : asymmetrical [No dominant masses] : no dominant masses in right breast  [No dominant masses] : no dominant masses left breast [No Nipple Retraction] : no left nipple retraction [No Nipple Discharge] : no left nipple discharge [No Axillary Lymphadenopathy] : no left axillary lymphadenopathy [No Edema] : no edema [de-identified] : Right breast smaller than left. [de-identified] : Radial scar 9:00. Mild posttreatment edema. Diffuse petechial appearance with flat purplish discoloration inferiorly.

## 2022-03-09 NOTE — HISTORY OF PRESENT ILLNESS
[FreeTextEntry1] : This is a 66 year old  female had felt a lump in her right breast for about 9 months at the site of a previous surgical biopsy.  When it was excised, it was found to be cancer.  She had a lumpectomy with sentinel node biopsy by Dr. Fuentes in 5/2017. She had a 3.7 cm invasive cancer, SLNx1 neg, ER+SC-Her 2 3+ tumor.  She then received chemotherapy with Adriamycin at [facility] [place]. She stopped after 3 treatments and transferred her care to Dr. Bingham where she completed additional chemotherapy and 9 months of Herceptin.  She had a DVT during chemotherapy and is on Xarelto. She then went on to radiation with Dr. Luo.  \par \par She is on letrozole.\par \par She was here for a routine visit on Feb 10 2021.  She was then seen on Feb 26.  She noticed a purplish rash on her right breast that started the night before.  She didn't recall any trauma, but her cats sometimes jump on her during the night.  She also carried a heavy case of water about 3 days prior.  She hadn't had any fever. Her breast had petechial discolorations with some yellowish color c/w bruising.  There may have been some erythema and she was also started on Keflex. It looked much better by the day after our visit. It fully resolved.\par \par She started having some discomfort in the right breast a few weeks ago and then again noticed a similar discoloration as before.\par \par

## 2022-03-09 NOTE — DATA REVIEWED
[FreeTextEntry1] : Bilateral mammogram and breast ultrasound ([facility]) 7/22/2021:  No mammographic or sonographic evidence of malignancy.

## 2022-03-09 NOTE — REASON FOR VISIT
Problem: NORMAL   Goal: Experiences normal transition  Description  INTERVENTIONS:  - Assess and monitor vital signs and lab values.   - Encourage skin-to-skin with caregiver for thermoregulation  - Assess signs, symptoms and risk factors for hypog [Follow-Up: _____] : a [unfilled] follow-up visit

## 2022-03-14 ENCOUNTER — APPOINTMENT (OUTPATIENT)
Dept: BREAST CENTER | Facility: CLINIC | Age: 67
End: 2022-03-14
Payer: MEDICARE

## 2022-03-14 ENCOUNTER — LABORATORY RESULT (OUTPATIENT)
Age: 67
End: 2022-03-14

## 2022-03-14 VITALS
HEIGHT: 71 IN | HEART RATE: 75 BPM | SYSTOLIC BLOOD PRESSURE: 153 MMHG | WEIGHT: 232 LBS | BODY MASS INDEX: 32.48 KG/M2 | DIASTOLIC BLOOD PRESSURE: 77 MMHG

## 2022-03-14 PROCEDURE — 11104 PUNCH BX SKIN SINGLE LESION: CPT

## 2022-03-14 NOTE — PROCEDURE
[FreeTextEntry1] : Right breast skin biopsy [FreeTextEntry2] : Assess skin change [FreeTextEntry3] : The right breast was prepped and draped.  Local 1% lidocaine was infiltrated.  An elliptical skin biopsy was performed at 6:00 in the area of greatest purplish skin discoloration.  The incision was closed with 2 #4-0 nylon sutures.  Steristrips and a Telfa/Tegaderm dressing was applied.  The patient tolerated the procedure well.

## 2022-03-23 ENCOUNTER — APPOINTMENT (OUTPATIENT)
Dept: BREAST CENTER | Facility: CLINIC | Age: 67
End: 2022-03-23
Payer: MEDICARE

## 2022-03-23 VITALS
WEIGHT: 232 LBS | DIASTOLIC BLOOD PRESSURE: 63 MMHG | HEART RATE: 83 BPM | BODY MASS INDEX: 32.48 KG/M2 | HEIGHT: 71 IN | SYSTOLIC BLOOD PRESSURE: 136 MMHG

## 2022-03-23 DIAGNOSIS — R21 RASH AND OTHER NONSPECIFIC SKIN ERUPTION: ICD-10-CM

## 2022-03-23 PROCEDURE — 99211 OFF/OP EST MAY X REQ PHY/QHP: CPT

## 2022-03-23 NOTE — PHYSICAL EXAM
[de-identified] : Diffuse petechial/telangiectasia appearance improved with yellowish discoloration of resorbing ecchymoses.\par Small incision inferior breast healing well- sutures removed.

## 2022-03-23 NOTE — HISTORY OF PRESENT ILLNESS
[FreeTextEntry1] : This is a 66 year old  female had felt a lump in her right breast for about 9 months at the site of a previous surgical biopsy.  When it was excised, it was found to be cancer.  She had a lumpectomy with sentinel node biopsy by Dr. Fuentes in 5/2017. She had a 3.7 cm invasive cancer, SLNx1 neg, ER+NH-Her 2 3+ tumor.  She then received chemotherapy with Adriamycin at [facility] [place]. She stopped after 3 treatments and transferred her care to Dr. Bingham where she completed additional chemotherapy and 9 months of Herceptin.  She had a DVT during chemotherapy and is on Xarelto. She then went on to radiation with Dr. Luo.  \par \par She is on letrozole.\par \par She was here for a routine visit on Feb 10 2021.  She was then seen on Feb 26.  She noticed a purplish rash on her right breast that started the night before.  She didn't recall any trauma, but her cats sometimes jump on her during the night.  She also carried a heavy case of water about 3 days prior.  She hadn't had any fever. Her breast had petechial discolorations with some yellowish color c/w bruising.  There may have been some erythema and she was also started on Keflex. It looked much better by the day after our visit. It fully resolved.\par \par She started having some discomfort in the right breast a few weeks ago and then again noticed a similar discoloration as before. A biopsy was done on 3/15/2022 showing vascular congestion.\par \par

## 2022-03-23 NOTE — DATA REVIEWED
[FreeTextEntry1] : Bilateral mammogram and breast ultrasound ([facility]) 7/22/2021:  No mammographic or sonographic evidence of malignancy.\par \par Pathology 3/14/2022:  Right breast skin biopsy= vascular congestion

## 2022-06-17 ENCOUNTER — APPOINTMENT (OUTPATIENT)
Dept: BREAST CENTER | Facility: CLINIC | Age: 67
End: 2022-06-17
Payer: MEDICARE

## 2022-06-17 VITALS
HEIGHT: 71 IN | HEART RATE: 77 BPM | BODY MASS INDEX: 32.48 KG/M2 | SYSTOLIC BLOOD PRESSURE: 151 MMHG | DIASTOLIC BLOOD PRESSURE: 73 MMHG | WEIGHT: 232 LBS

## 2022-06-17 PROCEDURE — 99213 OFFICE O/P EST LOW 20 MIN: CPT

## 2022-06-17 RX ORDER — RIVAROXABAN 2.5 MG/1
TABLET, FILM COATED ORAL
Refills: 0 | Status: DISCONTINUED | COMMUNITY
End: 2022-06-17

## 2022-06-17 NOTE — HISTORY OF PRESENT ILLNESS
[FreeTextEntry1] : This is a 67 year old  female had felt a lump in her right breast for about 9 months at the site of a previous surgical biopsy.  When it was excised, it was found to be cancer.  She had a lumpectomy with sentinel node biopsy by Dr. Fuentes in 5/2017. She had a 3.7 cm invasive cancer, SLNx1 neg, ER+KS-Her 2 3+ tumor.  She then received chemotherapy with Adriamycin at [facility] [place]. She stopped after 3 treatments and transferred her care to Dr. Bingham where she completed additional chemotherapy and 9 months of Herceptin.  She had a DVT during chemotherapy and is on Xarelto. She then went on to radiation with Dr. Luo.  \par \par She is on letrozole.\par \par She was here for a routine visit on Feb 10 2021.  She was then seen on Feb 26.  She noticed a purplish rash on her right breast that started the night before.  She didn't recall any trauma, but her cats sometimes jump on her during the night.  She also carried a heavy case of water about 3 days prior.  She hadn't had any fever. Her breast had petechial discolorations with some yellowish color c/w bruising.  There may have been some erythema and she was also started on Keflex. It looked much better by the day after our visit. It fully resolved.\par \par She then started having some discomfort in the right breast and again noticed a similar discoloration as before. A biopsy was done on 3/15/2022 showing vascular congestion.\par \par She has no current complaints.\par \par

## 2022-06-17 NOTE — PHYSICAL EXAM
[EOMI] : extra ocular movement intact [Sclera nonicteric] : sclera nonicteric [Supple] : supple [No Supraclavicular Adenopathy] : no supraclavicular adenopathy [No Cervical Adenopathy] : no cervical adenopathy [No Thyromegaly] : no thyromegaly [Clear to Auscultation Bilat] : clear to auscultation bilaterally [No dominant masses] : no dominant masses left breast [No Axillary Lymphadenopathy] : no left axillary lymphadenopathy [Soft] : abdomen soft [Not Tender] : non-tender [No dominant masses] : no dominant masses in right breast  [No Nipple Retraction] : no right nipple retraction [No Nipple Discharge] : no right nipple discharge [de-identified] : Mild skin thickening/post radiation change.  No petechiae.

## 2022-08-24 ENCOUNTER — APPOINTMENT (OUTPATIENT)
Dept: BREAST CENTER | Facility: CLINIC | Age: 67
End: 2022-08-24

## 2022-08-24 VITALS
HEART RATE: 82 BPM | WEIGHT: 232 LBS | BODY MASS INDEX: 32.48 KG/M2 | HEIGHT: 71 IN | DIASTOLIC BLOOD PRESSURE: 77 MMHG | SYSTOLIC BLOOD PRESSURE: 145 MMHG

## 2022-08-24 PROCEDURE — 99213 OFFICE O/P EST LOW 20 MIN: CPT

## 2022-08-24 NOTE — DATA REVIEWED
[FreeTextEntry1] : Bilateral mammogram and breast ultrasound ([facility]) 8/13/2022:  No mammographic or sonographic evidence of malignancy. No change.\par \par (Images not brought.)

## 2022-08-24 NOTE — PHYSICAL EXAM
[EOMI] : extra ocular movement intact [Sclera nonicteric] : sclera nonicteric [Supple] : supple [No Supraclavicular Adenopathy] : no supraclavicular adenopathy [No Cervical Adenopathy] : no cervical adenopathy [No Thyromegaly] : no thyromegaly [Clear to Auscultation Bilat] : clear to auscultation bilaterally [No dominant masses] : no dominant masses in right breast  [No dominant masses] : no dominant masses left breast [No Nipple Retraction] : no left nipple retraction [No Nipple Discharge] : no left nipple discharge [No Axillary Lymphadenopathy] : no left axillary lymphadenopathy [Soft] : abdomen soft [Not Tender] : non-tender [de-identified] : Mild skin thickening/post radiation change.

## 2022-08-24 NOTE — HISTORY OF PRESENT ILLNESS
[FreeTextEntry1] : This is a 67 year old  female had felt a lump in her right breast for about 9 months at the site of a previous surgical biopsy.  When it was excised, it was found to be cancer.  She had a lumpectomy with sentinel node biopsy by Dr. Fuentes in 5/2017. She had a 3.7 cm invasive cancer, SLNx1 neg, ER+VT-Her 2 3+ tumor.  She then received chemotherapy with Adriamycin at [facility] [place]. She stopped after 3 treatments and transferred her care to Dr. Bingham where she completed additional chemotherapy and 9 months of Herceptin.  She had a DVT during chemotherapy and was on Xarelto. She then went on to radiation with Dr. Luo.  \par \par She is on letrozole.\par \par She was here for a routine visit on Feb 10 2021.  She was then seen on Feb 26.  She noticed a purplish rash on her right breast that started the night before.  She didn't recall any trauma, but her cats sometimes jump on her during the night.  She also carried a heavy case of water about 3 days prior.  She hadn't had any fever. Her breast had petechial discolorations with some yellowish color c/w bruising.  There may have been some erythema and she was also started on Keflex. It looked much better by the day after our visit. It fully resolved.\par \par She then started having some discomfort in the right breast and again noticed a similar discoloration as before. A biopsy was done on 3/15/2022 showing vascular congestion.\par \par She has no current complaints.\par \par

## 2022-11-16 ENCOUNTER — APPOINTMENT (OUTPATIENT)
Dept: RADIATION ONCOLOGY | Facility: CLINIC | Age: 67
End: 2022-11-16

## 2022-12-14 ENCOUNTER — NON-APPOINTMENT (OUTPATIENT)
Age: 67
End: 2022-12-14

## 2022-12-14 ENCOUNTER — APPOINTMENT (OUTPATIENT)
Dept: RADIATION ONCOLOGY | Facility: CLINIC | Age: 67
End: 2022-12-14

## 2022-12-14 VITALS
SYSTOLIC BLOOD PRESSURE: 130 MMHG | DIASTOLIC BLOOD PRESSURE: 52 MMHG | HEIGHT: 71 IN | WEIGHT: 230 LBS | OXYGEN SATURATION: 96 % | HEART RATE: 93 BPM | BODY MASS INDEX: 32.2 KG/M2 | RESPIRATION RATE: 16 BRPM

## 2022-12-14 PROCEDURE — 99213 OFFICE O/P EST LOW 20 MIN: CPT
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
## 2022-12-14 NOTE — REVIEW OF SYSTEMS
[Joint Pain] : joint pain [Difficulty Walking] : difficulty walking [Negative] : Heme/Lymph [Breast Pain: Grade 0] : Breast Pain: Grade 0 [Gait Disturbance] : no gait disturbance

## 2022-12-14 NOTE — PHYSICAL EXAM
[Breast Palpation Mass] : no palpable masses [Breast Abnormal Lactation (Galactorrhea)] : no nipple discharge [No UE Edema] : there is no upper extremity edema [Normal] : oriented to person, place and time, the affect was normal, the mood was normal and not anxious [de-identified] : edema lower R breast

## 2022-12-21 ENCOUNTER — NON-APPOINTMENT (OUTPATIENT)
Age: 67
End: 2022-12-21

## 2022-12-28 ENCOUNTER — APPOINTMENT (OUTPATIENT)
Dept: VASCULAR SURGERY | Facility: CLINIC | Age: 67
End: 2022-12-28

## 2022-12-29 ENCOUNTER — APPOINTMENT (OUTPATIENT)
Dept: VASCULAR SURGERY | Facility: CLINIC | Age: 67
End: 2022-12-29

## 2022-12-29 VITALS
HEIGHT: 71 IN | WEIGHT: 230 LBS | DIASTOLIC BLOOD PRESSURE: 75 MMHG | SYSTOLIC BLOOD PRESSURE: 131 MMHG | BODY MASS INDEX: 32.2 KG/M2 | HEART RATE: 76 BPM

## 2022-12-29 PROCEDURE — 99212 OFFICE O/P EST SF 10 MIN: CPT | Mod: 25

## 2022-12-29 PROCEDURE — 29580 STRAPPING UNNA BOOT: CPT | Mod: RT

## 2022-12-29 NOTE — PHYSICAL EXAM
[FreeTextEntry1] : Approximately 2 x 4 noninfected appearing ulcer of the right medial malleoli are area without cellulitis or lymphangitis

## 2022-12-29 NOTE — HISTORY OF PRESENT ILLNESS
[FreeTextEntry1] : Patient presents for evaluation of a right medial malleoli are area ulcer that developed in September of this year when she injured her skin either putting on her removing her compression stocking.  The ulcer was treated by her primary care doctor and she states she had a regimen of oral antibiotics at 1 point in October.\par \par Currently, she denies fever, chills or other signs of infection.

## 2022-12-29 NOTE — ASSESSMENT
[FreeTextEntry1] : 67-year-old with right medial malleoli are area venous stasis ulcer without evidence of infection.  I noted my findings to the patient and recommended Unna boot compression.\par \par One was placed today and she will be seen in 1 week time for routine reevaluation.\par \par She was instructed to call me should she develop fever, chills or other signs of infection.

## 2023-01-06 ENCOUNTER — APPOINTMENT (OUTPATIENT)
Dept: VASCULAR SURGERY | Facility: CLINIC | Age: 68
End: 2023-01-06
Payer: MEDICARE

## 2023-01-06 VITALS
SYSTOLIC BLOOD PRESSURE: 148 MMHG | HEART RATE: 82 BPM | HEIGHT: 71 IN | BODY MASS INDEX: 32.2 KG/M2 | WEIGHT: 230 LBS | DIASTOLIC BLOOD PRESSURE: 71 MMHG

## 2023-01-06 PROCEDURE — 29580 STRAPPING UNNA BOOT: CPT | Mod: RT

## 2023-01-06 PROCEDURE — 99212 OFFICE O/P EST SF 10 MIN: CPT | Mod: 25

## 2023-01-06 NOTE — ASSESSMENT
[FreeTextEntry1] : 67-year-old with right medial malleoli are area ulcer, noninfected appearing.  I noted my findings to the patient and recommended continuation of Unna boot compression therapy.\par \par I noted she should contact me immediately should she develop fever, chills or other signs of infection.\par \par She will follow-up with me in 1 week's time.\par \par All questions were answered.

## 2023-01-06 NOTE — HISTORY OF PRESENT ILLNESS
[FreeTextEntry1] : Patient presents for routine reevaluation of her right medial malleoli are area ulcer.  She had undergone 1 week of Unna boot compression therapy.  She denies fever, chills, or other signs of infection.  She occasionally notes discomfort of the ulcer area.

## 2023-01-06 NOTE — PHYSICAL EXAM
[FreeTextEntry1] : Noninfected appearing 2 x 4 cm, clean ulcer of the right medial malleoli are area; no cellulitis or lymphangitis

## 2023-01-11 ENCOUNTER — APPOINTMENT (OUTPATIENT)
Dept: VASCULAR SURGERY | Facility: CLINIC | Age: 68
End: 2023-01-11
Payer: MEDICARE

## 2023-01-11 PROCEDURE — 29580 STRAPPING UNNA BOOT: CPT | Mod: RT

## 2023-01-18 ENCOUNTER — APPOINTMENT (OUTPATIENT)
Dept: VASCULAR SURGERY | Facility: CLINIC | Age: 68
End: 2023-01-18
Payer: MEDICARE

## 2023-01-18 VITALS
BODY MASS INDEX: 32.2 KG/M2 | HEIGHT: 71 IN | SYSTOLIC BLOOD PRESSURE: 133 MMHG | HEART RATE: 85 BPM | DIASTOLIC BLOOD PRESSURE: 76 MMHG | WEIGHT: 230 LBS

## 2023-01-18 PROCEDURE — 29580 STRAPPING UNNA BOOT: CPT | Mod: RT

## 2023-01-18 PROCEDURE — 99212 OFFICE O/P EST SF 10 MIN: CPT | Mod: 25

## 2023-01-18 NOTE — PHYSICAL EXAM
[FreeTextEntry1] : Healing, noninfected appearing, ulcer of the right medial malleoli are area; no cellulitis or lymphangitis; base of ulcer filled with clean, healthy, granulating tissue

## 2023-01-18 NOTE — HISTORY OF PRESENT ILLNESS
[FreeTextEntry1] : Patient presents for routine reevaluation of her right medial malleoli are venous stasis ulcer.  She denies fever, chills, or other signs of infection.

## 2023-01-18 NOTE — ASSESSMENT
[FreeTextEntry1] : 67-year-old female with healing right medial malleoli are venous stasis ulcer.  I noted my findings on physical examination to the patient and suggested continued compression with an Unna boot.\par \par She was instructed to contact me immediately should she develop fever or chills.\par \par All questions were answered.

## 2023-01-25 ENCOUNTER — APPOINTMENT (OUTPATIENT)
Dept: VASCULAR SURGERY | Facility: CLINIC | Age: 68
End: 2023-01-25
Payer: MEDICARE

## 2023-01-25 PROCEDURE — 29580 STRAPPING UNNA BOOT: CPT | Mod: RT

## 2023-02-01 ENCOUNTER — APPOINTMENT (OUTPATIENT)
Dept: VASCULAR SURGERY | Facility: CLINIC | Age: 68
End: 2023-02-01
Payer: MEDICARE

## 2023-02-01 VITALS — SYSTOLIC BLOOD PRESSURE: 143 MMHG | DIASTOLIC BLOOD PRESSURE: 75 MMHG | HEART RATE: 72 BPM

## 2023-02-01 PROCEDURE — 99212 OFFICE O/P EST SF 10 MIN: CPT | Mod: 25

## 2023-02-01 PROCEDURE — 29580 STRAPPING UNNA BOOT: CPT | Mod: RT

## 2023-02-01 NOTE — HISTORY OF PRESENT ILLNESS
[FreeTextEntry1] : Presents for routine evaluation of her right medial malleoli are area ulcer.  She denies fever, chills or other signs of infection.  She has been undergoing Unna boot compression.

## 2023-02-01 NOTE — ASSESSMENT
[FreeTextEntry1] : \par 7-year-old female with right medial malleoli are area venous stasis ulcer treated with Unna boot compression.  I noted my findings to the patient and suggested continuation of Unna boot compression for the time being.\par \par All questions were answered.

## 2023-02-01 NOTE — PHYSICAL EXAM
[FreeTextEntry1] : Noninfected appearing approximately 2 x 3 cm ulcer of the right medial malleoli are area; clean granulating base; no cellulitis or lymphangitis

## 2023-02-08 ENCOUNTER — APPOINTMENT (OUTPATIENT)
Dept: VASCULAR SURGERY | Facility: CLINIC | Age: 68
End: 2023-02-08
Payer: MEDICARE

## 2023-02-08 VITALS — SYSTOLIC BLOOD PRESSURE: 128 MMHG | HEART RATE: 88 BPM | DIASTOLIC BLOOD PRESSURE: 76 MMHG

## 2023-02-08 PROCEDURE — 29580 STRAPPING UNNA BOOT: CPT | Mod: RT

## 2023-02-08 PROCEDURE — 99212 OFFICE O/P EST SF 10 MIN: CPT | Mod: 25

## 2023-02-08 NOTE — PHYSICAL EXAM
[FreeTextEntry1] : Very slowly improving right medial malleoli are area ulcer with slight contraction and epithelialization of the wound edges; no cellulitis or lymphangitis; base of ulcer filled with clean, healthy granulating tissue

## 2023-02-08 NOTE — ASSESSMENT
[FreeTextEntry1] : 67-year-old female with slowly healing right medial malleoli are area ulcer secondary to Unna boot compression.  I noted my findings and suggested continuation of Unna boot compression therapy.\par \par All questions were answered.

## 2023-02-08 NOTE — HISTORY OF PRESENT ILLNESS
[FreeTextEntry1] : Patient presents for routine reevaluation of her right medial malleoli are area ulcer.  She has been undergoing Unna boot compression therapy.  She denies fever, chills or other signs of infection.

## 2023-02-15 ENCOUNTER — APPOINTMENT (OUTPATIENT)
Dept: VASCULAR SURGERY | Facility: CLINIC | Age: 68
End: 2023-02-15
Payer: MEDICARE

## 2023-02-15 VITALS
SYSTOLIC BLOOD PRESSURE: 152 MMHG | HEART RATE: 80 BPM | DIASTOLIC BLOOD PRESSURE: 67 MMHG | WEIGHT: 230 LBS | HEIGHT: 71 IN | BODY MASS INDEX: 32.2 KG/M2

## 2023-02-15 PROCEDURE — 29580 STRAPPING UNNA BOOT: CPT | Mod: RT

## 2023-02-15 PROCEDURE — 99212 OFFICE O/P EST SF 10 MIN: CPT | Mod: 25

## 2023-02-15 NOTE — ASSESSMENT
[FreeTextEntry1] : 67-year-old with healing right medial malleoli are venous stasis ulcer with Unna boot compression therapy.  I noted my findings to the patient and recommended continuation of compression with Unna boot.\par \par In addition, she will undergo a venous duplex study to reevaluate her lower extremity venous valvular system.\par \par All questions were answered.

## 2023-02-15 NOTE — PHYSICAL EXAM
[FreeTextEntry1] : Juanita, epithelializing, noninfected and appearing ulcer of the right medial malleoli are area; no cellulitis or lymphangitis; slight stasis dermatitis changes inferior to the ulcer

## 2023-02-15 NOTE — HISTORY OF PRESENT ILLNESS
[FreeTextEntry1] : Patient presents for routine reevaluation of her right medial malleoli are area venous stasis ulcer.  She has been undergoing Unna boot compression.  She denies fever, chills or other signs of infection.

## 2023-02-22 ENCOUNTER — APPOINTMENT (OUTPATIENT)
Dept: VASCULAR SURGERY | Facility: CLINIC | Age: 68
End: 2023-02-22
Payer: MEDICARE

## 2023-02-22 PROCEDURE — 29580 STRAPPING UNNA BOOT: CPT | Mod: RT

## 2023-02-22 PROCEDURE — 93970 EXTREMITY STUDY: CPT

## 2023-03-01 ENCOUNTER — APPOINTMENT (OUTPATIENT)
Dept: VASCULAR SURGERY | Facility: CLINIC | Age: 68
End: 2023-03-01
Payer: MEDICARE

## 2023-03-01 VITALS
HEIGHT: 71 IN | HEART RATE: 88 BPM | DIASTOLIC BLOOD PRESSURE: 78 MMHG | BODY MASS INDEX: 32.2 KG/M2 | WEIGHT: 230 LBS | SYSTOLIC BLOOD PRESSURE: 130 MMHG

## 2023-03-01 PROCEDURE — 29580 STRAPPING UNNA BOOT: CPT | Mod: RT

## 2023-03-01 PROCEDURE — 99212 OFFICE O/P EST SF 10 MIN: CPT | Mod: 25

## 2023-03-01 NOTE — ASSESSMENT
[FreeTextEntry1] : 67-year-old with healing right medial malleolar area ulcer secondary to Unna boot compression.  Recommended continuation of Unna boot compression.\par \par In addition I discussed the results of the venous duplex study and noted that no further intervention was necessary.\par \par Lastly, I again instructed her to contact me immediately should she experience fever or chills.

## 2023-03-01 NOTE — HISTORY OF PRESENT ILLNESS
[FreeTextEntry1] : Patient presents for routine reevaluation of her right medial malleolar area ulcer.  She denies fever, chills or other signs of infection.\par \par She had undergone a venous duplex study in the office on February 22, 2023.  This study demonstrated no evidence of deep or superficial thrombophlebitis and no evidence of venous valvular incompetence bilaterally.

## 2023-03-01 NOTE — PHYSICAL EXAM
[FreeTextEntry1] : Healing, sang, noninfected ulcer of the right medial malleolar area; no cellulitis or lymphangitis

## 2023-03-01 NOTE — REASON FOR VISIT
[Follow-Up: _____] : a [unfilled] follow-up visit [FreeTextEntry1] : unna boot follow up on right leg and discuss venous duplex results

## 2023-03-09 ENCOUNTER — APPOINTMENT (OUTPATIENT)
Dept: VASCULAR SURGERY | Facility: CLINIC | Age: 68
End: 2023-03-09
Payer: MEDICARE

## 2023-03-09 PROCEDURE — 29580 STRAPPING UNNA BOOT: CPT | Mod: RT

## 2023-03-15 ENCOUNTER — APPOINTMENT (OUTPATIENT)
Dept: VASCULAR SURGERY | Facility: CLINIC | Age: 68
End: 2023-03-15
Payer: MEDICARE

## 2023-03-15 VITALS
BODY MASS INDEX: 32.2 KG/M2 | HEART RATE: 89 BPM | DIASTOLIC BLOOD PRESSURE: 68 MMHG | WEIGHT: 230 LBS | HEIGHT: 71 IN | SYSTOLIC BLOOD PRESSURE: 134 MMHG

## 2023-03-15 PROCEDURE — 29580 STRAPPING UNNA BOOT: CPT | Mod: RT

## 2023-03-15 PROCEDURE — 99212 OFFICE O/P EST SF 10 MIN: CPT | Mod: 25

## 2023-03-15 NOTE — PHYSICAL EXAM
[FreeTextEntry1] : Almost healed right medial malleolar ulcer; stasis dermatitis patch below the ulcer; no cellulitis or lymphangitis

## 2023-03-15 NOTE — ASSESSMENT
[FreeTextEntry1] : 67-year-old female with right medial malleolar area ulcer almost healed with Unna boot compression therapy.  I noted my findings to the patient and recommended 1 week more of Unna boot compression.\par \par All questions were answered.

## 2023-03-15 NOTE — HISTORY OF PRESENT ILLNESS
[FreeTextEntry1] : Patient presents for routine reevaluation of her right medial malleolar area ulcer.  She denies fever, chills, or other signs of infection.  She has been undergoing Unna boot compression therapy.

## 2023-03-22 ENCOUNTER — APPOINTMENT (OUTPATIENT)
Dept: VASCULAR SURGERY | Facility: CLINIC | Age: 68
End: 2023-03-22
Payer: MEDICARE

## 2023-03-22 VITALS — SYSTOLIC BLOOD PRESSURE: 141 MMHG | DIASTOLIC BLOOD PRESSURE: 78 MMHG | HEART RATE: 88 BPM

## 2023-03-22 DIAGNOSIS — L97.311 VARICOSE VEINS OF RIGHT LOWER EXTREMITY WITH ULCER OF ANKLE: ICD-10-CM

## 2023-03-22 DIAGNOSIS — I83.013 VARICOSE VEINS OF RIGHT LOWER EXTREMITY WITH ULCER OF ANKLE: ICD-10-CM

## 2023-03-22 PROCEDURE — 99212 OFFICE O/P EST SF 10 MIN: CPT

## 2023-03-22 NOTE — HISTORY OF PRESENT ILLNESS
[FreeTextEntry1] : Patient presents for routine reevaluation of her right medial malleolar ulcer.  She denies fever, chills or other signs of infection.

## 2023-03-22 NOTE — ASSESSMENT
[FreeTextEntry1] : 67-year-old with healed right medial malleoli are area ulcer and no evidence of cellulitis or lymphangitis.  I recommended continuation of compression therapy and represcribed knee-high stockings at 20 to 30 mmHg compression.\par \par She will follow-up with me in 3 months time for routine reevaluation or sooner should circumstances require.\par \par All questions were answered.

## 2023-06-21 ENCOUNTER — APPOINTMENT (OUTPATIENT)
Dept: VASCULAR SURGERY | Facility: CLINIC | Age: 68
End: 2023-06-21

## 2023-06-21 NOTE — ED ADULT NURSE NOTE - NS ED PATIENT SAFETY CONCERN
Render Post-Care Instructions In Note?: no Extraction Method: cotton-tipped applicators and gentle pressure Prep Text (Optional): Prior to removal the treatment areas were prepped in the usual fashion. Acne Type: Comedonal Lesions Consent was obtained and risks were reviewed including but not limited to scarring, infection, bleeding, scabbing, incomplete removal, and allergy to anesthesia. Post-Care Instructions: I reviewed with the patient in detail post-care instructions. Patient is to keep the treatment areas dry overnight, and then apply bacitracin twice daily until healed. Patient may apply hydrogen peroxide soaks to remove any crusting. Detail Level: Detailed No

## 2023-07-16 ENCOUNTER — INPATIENT (INPATIENT)
Facility: HOSPITAL | Age: 68
LOS: 9 days | Discharge: NURSING, REHAB OR LONG-TERM CARE | DRG: 871 | End: 2023-07-26
Attending: HOSPITALIST | Admitting: FAMILY MEDICINE
Payer: MEDICARE

## 2023-07-16 VITALS
TEMPERATURE: 100 F | OXYGEN SATURATION: 94 % | HEART RATE: 92 BPM | WEIGHT: 225.09 LBS | RESPIRATION RATE: 17 BRPM | SYSTOLIC BLOOD PRESSURE: 130 MMHG | DIASTOLIC BLOOD PRESSURE: 50 MMHG | HEIGHT: 71 IN

## 2023-07-16 DIAGNOSIS — Z90.11 ACQUIRED ABSENCE OF RIGHT BREAST AND NIPPLE: Chronic | ICD-10-CM

## 2023-07-16 DIAGNOSIS — Z98.890 OTHER SPECIFIED POSTPROCEDURAL STATES: Chronic | ICD-10-CM

## 2023-07-16 DIAGNOSIS — M62.82 RHABDOMYOLYSIS: ICD-10-CM

## 2023-07-16 LAB
ALBUMIN SERPL ELPH-MCNC: 3 G/DL — LOW (ref 3.3–5)
ALP SERPL-CCNC: 91 U/L — SIGNIFICANT CHANGE UP (ref 40–120)
ALT FLD-CCNC: 340 U/L — HIGH (ref 12–78)
ANION GAP SERPL CALC-SCNC: 9 MMOL/L — SIGNIFICANT CHANGE UP (ref 5–17)
APPEARANCE UR: CLEAR — SIGNIFICANT CHANGE UP
APTT BLD: 24.7 SEC — LOW (ref 27.5–35.5)
AST SERPL-CCNC: 1155 U/L — HIGH (ref 15–37)
BACTERIA # UR AUTO: ABNORMAL
BASOPHILS # BLD AUTO: 0.03 K/UL — SIGNIFICANT CHANGE UP (ref 0–0.2)
BASOPHILS NFR BLD AUTO: 0.2 % — SIGNIFICANT CHANGE UP (ref 0–2)
BILIRUB SERPL-MCNC: 0.9 MG/DL — SIGNIFICANT CHANGE UP (ref 0.2–1.2)
BILIRUB UR-MCNC: NEGATIVE — SIGNIFICANT CHANGE UP
BLD GP AB SCN SERPL QL: SIGNIFICANT CHANGE UP
BUN SERPL-MCNC: 16 MG/DL — SIGNIFICANT CHANGE UP (ref 7–23)
CALCIUM SERPL-MCNC: 8.9 MG/DL — SIGNIFICANT CHANGE UP (ref 8.5–10.1)
CHLORIDE SERPL-SCNC: 97 MMOL/L — SIGNIFICANT CHANGE UP (ref 96–108)
CK SERPL-CCNC: SIGNIFICANT CHANGE UP U/L (ref 26–192)
CO2 SERPL-SCNC: 23 MMOL/L — SIGNIFICANT CHANGE UP (ref 22–31)
COLOR SPEC: ABNORMAL
CREAT SERPL-MCNC: 1.26 MG/DL — SIGNIFICANT CHANGE UP (ref 0.5–1.3)
DIFF PNL FLD: ABNORMAL
EGFR: 46 ML/MIN/1.73M2 — LOW
EOSINOPHIL # BLD AUTO: 0 K/UL — SIGNIFICANT CHANGE UP (ref 0–0.5)
EOSINOPHIL NFR BLD AUTO: 0 % — SIGNIFICANT CHANGE UP (ref 0–6)
EPI CELLS # UR: SIGNIFICANT CHANGE UP
GLUCOSE SERPL-MCNC: 194 MG/DL — HIGH (ref 70–99)
GLUCOSE UR QL: NEGATIVE — SIGNIFICANT CHANGE UP
HCT VFR BLD CALC: 42.1 % — SIGNIFICANT CHANGE UP (ref 34.5–45)
HGB BLD-MCNC: 14.6 G/DL — SIGNIFICANT CHANGE UP (ref 11.5–15.5)
IMM GRANULOCYTES NFR BLD AUTO: 0.5 % — SIGNIFICANT CHANGE UP (ref 0–0.9)
INR BLD: 1.2 RATIO — HIGH (ref 0.88–1.16)
KETONES UR-MCNC: ABNORMAL
LACTATE SERPL-SCNC: 1.3 MMOL/L — SIGNIFICANT CHANGE UP (ref 0.7–2)
LACTATE SERPL-SCNC: 2.6 MMOL/L — HIGH (ref 0.7–2)
LACTATE SERPL-SCNC: 3 MMOL/L — HIGH (ref 0.7–2)
LEUKOCYTE ESTERASE UR-ACNC: ABNORMAL
LYMPHOCYTES # BLD AUTO: 0.89 K/UL — LOW (ref 1–3.3)
LYMPHOCYTES # BLD AUTO: 7.2 % — LOW (ref 13–44)
MAGNESIUM SERPL-MCNC: 2.2 MG/DL — SIGNIFICANT CHANGE UP (ref 1.6–2.6)
MCHC RBC-ENTMCNC: 30.6 PG — SIGNIFICANT CHANGE UP (ref 27–34)
MCHC RBC-ENTMCNC: 34.7 GM/DL — SIGNIFICANT CHANGE UP (ref 32–36)
MCV RBC AUTO: 88.3 FL — SIGNIFICANT CHANGE UP (ref 80–100)
MONOCYTES # BLD AUTO: 0.49 K/UL — SIGNIFICANT CHANGE UP (ref 0–0.9)
MONOCYTES NFR BLD AUTO: 4 % — SIGNIFICANT CHANGE UP (ref 2–14)
NEUTROPHILS # BLD AUTO: 10.93 K/UL — HIGH (ref 1.8–7.4)
NEUTROPHILS NFR BLD AUTO: 88.1 % — HIGH (ref 43–77)
NITRITE UR-MCNC: NEGATIVE — SIGNIFICANT CHANGE UP
PH UR: 5 — SIGNIFICANT CHANGE UP (ref 5–8)
PHOSPHATE SERPL-MCNC: 1.9 MG/DL — LOW (ref 2.5–4.5)
PLATELET # BLD AUTO: 280 K/UL — SIGNIFICANT CHANGE UP (ref 150–400)
POTASSIUM SERPL-MCNC: 3.7 MMOL/L — SIGNIFICANT CHANGE UP (ref 3.5–5.3)
POTASSIUM SERPL-SCNC: 3.7 MMOL/L — SIGNIFICANT CHANGE UP (ref 3.5–5.3)
PROT SERPL-MCNC: 7.9 GM/DL — SIGNIFICANT CHANGE UP (ref 6–8.3)
PROT UR-MCNC: 100
PROTHROM AB SERPL-ACNC: 13.9 SEC — HIGH (ref 10.5–13.4)
RAPID RVP RESULT: SIGNIFICANT CHANGE UP
RBC # BLD: 4.77 M/UL — SIGNIFICANT CHANGE UP (ref 3.8–5.2)
RBC # FLD: 12.9 % — SIGNIFICANT CHANGE UP (ref 10.3–14.5)
RBC CASTS # UR COMP ASSIST: ABNORMAL /HPF (ref 0–4)
SARS-COV-2 RNA SPEC QL NAA+PROBE: SIGNIFICANT CHANGE UP
SODIUM SERPL-SCNC: 129 MMOL/L — LOW (ref 135–145)
SP GR SPEC: 1.02 — SIGNIFICANT CHANGE UP (ref 1.01–1.02)
TROPONIN I, HIGH SENSITIVITY RESULT: HIGH NG/L
TROPONIN I, HIGH SENSITIVITY RESULT: HIGH NG/L
UROBILINOGEN FLD QL: NEGATIVE — SIGNIFICANT CHANGE UP
WBC # BLD: 12.4 K/UL — HIGH (ref 3.8–10.5)
WBC # FLD AUTO: 12.4 K/UL — HIGH (ref 3.8–10.5)
WBC UR QL: SIGNIFICANT CHANGE UP /HPF (ref 0–5)

## 2023-07-16 PROCEDURE — 82306 VITAMIN D 25 HYDROXY: CPT

## 2023-07-16 PROCEDURE — 73700 CT LOWER EXTREMITY W/O DYE: CPT | Mod: RT

## 2023-07-16 PROCEDURE — 71045 X-RAY EXAM CHEST 1 VIEW: CPT

## 2023-07-16 PROCEDURE — 84484 ASSAY OF TROPONIN QUANT: CPT

## 2023-07-16 PROCEDURE — 76376 3D RENDER W/INTRP POSTPROCES: CPT

## 2023-07-16 PROCEDURE — 83735 ASSAY OF MAGNESIUM: CPT

## 2023-07-16 PROCEDURE — 80069 RENAL FUNCTION PANEL: CPT

## 2023-07-16 PROCEDURE — 99223 1ST HOSP IP/OBS HIGH 75: CPT

## 2023-07-16 PROCEDURE — C1769: CPT

## 2023-07-16 PROCEDURE — 97163 PT EVAL HIGH COMPLEX 45 MIN: CPT | Mod: GP

## 2023-07-16 PROCEDURE — 73562 X-RAY EXAM OF KNEE 3: CPT | Mod: 26,50

## 2023-07-16 PROCEDURE — C1894: CPT

## 2023-07-16 PROCEDURE — 71275 CT ANGIOGRAPHY CHEST: CPT

## 2023-07-16 PROCEDURE — 82607 VITAMIN B-12: CPT

## 2023-07-16 PROCEDURE — 83880 ASSAY OF NATRIURETIC PEPTIDE: CPT

## 2023-07-16 PROCEDURE — 93010 ELECTROCARDIOGRAM REPORT: CPT

## 2023-07-16 PROCEDURE — 85025 COMPLETE CBC W/AUTO DIFF WBC: CPT

## 2023-07-16 PROCEDURE — 83036 HEMOGLOBIN GLYCOSYLATED A1C: CPT

## 2023-07-16 PROCEDURE — 84100 ASSAY OF PHOSPHORUS: CPT

## 2023-07-16 PROCEDURE — 93454 CORONARY ARTERY ANGIO S&I: CPT

## 2023-07-16 PROCEDURE — 94640 AIRWAY INHALATION TREATMENT: CPT

## 2023-07-16 PROCEDURE — 80053 COMPREHEN METABOLIC PANEL: CPT

## 2023-07-16 PROCEDURE — 84443 ASSAY THYROID STIM HORMONE: CPT

## 2023-07-16 PROCEDURE — 93970 EXTREMITY STUDY: CPT

## 2023-07-16 PROCEDURE — 80074 ACUTE HEPATITIS PANEL: CPT

## 2023-07-16 PROCEDURE — 93005 ELECTROCARDIOGRAM TRACING: CPT

## 2023-07-16 PROCEDURE — 80048 BASIC METABOLIC PNL TOTAL CA: CPT

## 2023-07-16 PROCEDURE — C1760: CPT

## 2023-07-16 PROCEDURE — 84436 ASSAY OF TOTAL THYROXINE: CPT

## 2023-07-16 PROCEDURE — 84480 ASSAY TRIIODOTHYRONINE (T3): CPT

## 2023-07-16 PROCEDURE — 93010 ELECTROCARDIOGRAM REPORT: CPT | Mod: 77

## 2023-07-16 PROCEDURE — 85027 COMPLETE CBC AUTOMATED: CPT

## 2023-07-16 PROCEDURE — 80061 LIPID PANEL: CPT

## 2023-07-16 PROCEDURE — 93308 TTE F-UP OR LMTD: CPT

## 2023-07-16 PROCEDURE — 97530 THERAPEUTIC ACTIVITIES: CPT | Mod: GP

## 2023-07-16 PROCEDURE — 97116 GAIT TRAINING THERAPY: CPT | Mod: GP

## 2023-07-16 PROCEDURE — C1887: CPT

## 2023-07-16 PROCEDURE — 83605 ASSAY OF LACTIC ACID: CPT

## 2023-07-16 PROCEDURE — 70450 CT HEAD/BRAIN W/O DYE: CPT | Mod: 26,MA

## 2023-07-16 PROCEDURE — 72125 CT NECK SPINE W/O DYE: CPT | Mod: 26,MA

## 2023-07-16 PROCEDURE — 71250 CT THORAX DX C-: CPT | Mod: 26,MA

## 2023-07-16 PROCEDURE — 71250 CT THORAX DX C-: CPT

## 2023-07-16 PROCEDURE — 87507 IADNA-DNA/RNA PROBE TQ 12-25: CPT

## 2023-07-16 PROCEDURE — 93306 TTE W/DOPPLER COMPLETE: CPT

## 2023-07-16 PROCEDURE — 82550 ASSAY OF CK (CPK): CPT

## 2023-07-16 PROCEDURE — 99291 CRITICAL CARE FIRST HOUR: CPT

## 2023-07-16 PROCEDURE — 74176 CT ABD & PELVIS W/O CONTRAST: CPT | Mod: 26,MA

## 2023-07-16 PROCEDURE — 36415 COLL VENOUS BLD VENIPUNCTURE: CPT

## 2023-07-16 RX ORDER — VENLAFAXINE HCL 75 MG
1 CAPSULE, EXT RELEASE 24 HR ORAL
Refills: 0 | DISCHARGE

## 2023-07-16 RX ORDER — ENOXAPARIN SODIUM 100 MG/ML
100 INJECTION SUBCUTANEOUS ONCE
Refills: 0 | Status: COMPLETED | OUTPATIENT
Start: 2023-07-16 | End: 2023-07-16

## 2023-07-16 RX ORDER — SODIUM CHLORIDE 9 MG/ML
1000 INJECTION INTRAMUSCULAR; INTRAVENOUS; SUBCUTANEOUS ONCE
Refills: 0 | Status: COMPLETED | OUTPATIENT
Start: 2023-07-16 | End: 2023-07-16

## 2023-07-16 RX ORDER — ENOXAPARIN SODIUM 100 MG/ML
100 INJECTION SUBCUTANEOUS EVERY 24 HOURS
Refills: 0 | Status: DISCONTINUED | OUTPATIENT
Start: 2023-07-16 | End: 2023-07-17

## 2023-07-16 RX ORDER — CEFEPIME 1 G/1
1000 INJECTION, POWDER, FOR SOLUTION INTRAMUSCULAR; INTRAVENOUS ONCE
Refills: 0 | Status: COMPLETED | OUTPATIENT
Start: 2023-07-16 | End: 2023-07-16

## 2023-07-16 RX ORDER — VENLAFAXINE HCL 75 MG
150 CAPSULE, EXT RELEASE 24 HR ORAL DAILY
Refills: 0 | Status: DISCONTINUED | OUTPATIENT
Start: 2023-07-16 | End: 2023-07-26

## 2023-07-16 RX ORDER — SODIUM CHLORIDE 9 MG/ML
1000 INJECTION INTRAMUSCULAR; INTRAVENOUS; SUBCUTANEOUS
Refills: 0 | Status: DISCONTINUED | OUTPATIENT
Start: 2023-07-16 | End: 2023-07-17

## 2023-07-16 RX ORDER — ACETAMINOPHEN 500 MG
650 TABLET ORAL EVERY 6 HOURS
Refills: 0 | Status: DISCONTINUED | OUTPATIENT
Start: 2023-07-16 | End: 2023-07-26

## 2023-07-16 RX ORDER — ACETAMINOPHEN 500 MG
1000 TABLET ORAL ONCE
Refills: 0 | Status: COMPLETED | OUTPATIENT
Start: 2023-07-16 | End: 2023-07-16

## 2023-07-16 RX ORDER — LETROZOLE 2.5 MG/1
2.5 TABLET, FILM COATED ORAL DAILY
Refills: 0 | Status: DISCONTINUED | OUTPATIENT
Start: 2023-07-16 | End: 2023-07-26

## 2023-07-16 RX ORDER — VANCOMYCIN HCL 1 G
1500 VIAL (EA) INTRAVENOUS ONCE
Refills: 0 | Status: COMPLETED | OUTPATIENT
Start: 2023-07-16 | End: 2023-07-16

## 2023-07-16 RX ORDER — METOPROLOL TARTRATE 50 MG
75 TABLET ORAL
Qty: 0 | Refills: 0 | DISCHARGE

## 2023-07-16 RX ORDER — METOPROLOL TARTRATE 50 MG
75 TABLET ORAL DAILY
Refills: 0 | Status: DISCONTINUED | OUTPATIENT
Start: 2023-07-16 | End: 2023-07-23

## 2023-07-16 RX ORDER — LANOLIN ALCOHOL/MO/W.PET/CERES
3 CREAM (GRAM) TOPICAL AT BEDTIME
Refills: 0 | Status: DISCONTINUED | OUTPATIENT
Start: 2023-07-16 | End: 2023-07-26

## 2023-07-16 RX ORDER — ONDANSETRON 8 MG/1
1 TABLET, FILM COATED ORAL
Refills: 0 | DISCHARGE

## 2023-07-16 RX ORDER — SODIUM CHLORIDE 9 MG/ML
2200 INJECTION INTRAMUSCULAR; INTRAVENOUS; SUBCUTANEOUS ONCE
Refills: 0 | Status: DISCONTINUED | OUTPATIENT
Start: 2023-07-16 | End: 2023-07-16

## 2023-07-16 RX ORDER — CEFEPIME 1 G/1
2000 INJECTION, POWDER, FOR SOLUTION INTRAMUSCULAR; INTRAVENOUS EVERY 8 HOURS
Refills: 0 | Status: DISCONTINUED | OUTPATIENT
Start: 2023-07-16 | End: 2023-07-17

## 2023-07-16 RX ORDER — SODIUM CHLORIDE 9 MG/ML
1200 INJECTION, SOLUTION INTRAVENOUS ONCE
Refills: 0 | Status: COMPLETED | OUTPATIENT
Start: 2023-07-16 | End: 2023-07-16

## 2023-07-16 RX ORDER — CEFEPIME 1 G/1
1000 INJECTION, POWDER, FOR SOLUTION INTRAMUSCULAR; INTRAVENOUS ONCE
Refills: 0 | Status: DISCONTINUED | OUTPATIENT
Start: 2023-07-16 | End: 2023-07-16

## 2023-07-16 RX ORDER — ASPIRIN/CALCIUM CARB/MAGNESIUM 324 MG
1 TABLET ORAL
Qty: 0 | Refills: 0 | DISCHARGE

## 2023-07-16 RX ORDER — ONDANSETRON 8 MG/1
4 TABLET, FILM COATED ORAL EVERY 6 HOURS
Refills: 0 | Status: DISCONTINUED | OUTPATIENT
Start: 2023-07-16 | End: 2023-07-26

## 2023-07-16 RX ORDER — LETROZOLE 2.5 MG/1
1 TABLET, FILM COATED ORAL
Refills: 0 | DISCHARGE

## 2023-07-16 RX ADMIN — SODIUM CHLORIDE 125 MILLILITER(S): 9 INJECTION INTRAMUSCULAR; INTRAVENOUS; SUBCUTANEOUS at 22:45

## 2023-07-16 RX ADMIN — SODIUM CHLORIDE 1000 MILLILITER(S): 9 INJECTION INTRAMUSCULAR; INTRAVENOUS; SUBCUTANEOUS at 13:25

## 2023-07-16 RX ADMIN — Medication 1500 MILLIGRAM(S): at 17:00

## 2023-07-16 RX ADMIN — Medication 1000 MILLIGRAM(S): at 13:20

## 2023-07-16 RX ADMIN — Medication 3 MILLIGRAM(S): at 23:50

## 2023-07-16 RX ADMIN — SODIUM CHLORIDE 1200 MILLILITER(S): 9 INJECTION, SOLUTION INTRAVENOUS at 14:55

## 2023-07-16 RX ADMIN — Medication 75 MILLIGRAM(S): at 23:49

## 2023-07-16 RX ADMIN — ENOXAPARIN SODIUM 100 MILLIGRAM(S): 100 INJECTION SUBCUTANEOUS at 16:03

## 2023-07-16 RX ADMIN — CEFEPIME 1000 MILLIGRAM(S): 1 INJECTION, POWDER, FOR SOLUTION INTRAMUSCULAR; INTRAVENOUS at 13:15

## 2023-07-16 RX ADMIN — Medication 250 MILLIGRAM(S): at 14:46

## 2023-07-16 RX ADMIN — SODIUM CHLORIDE 2000 MILLILITER(S): 9 INJECTION INTRAMUSCULAR; INTRAVENOUS; SUBCUTANEOUS at 12:55

## 2023-07-16 RX ADMIN — SODIUM CHLORIDE 1200 MILLILITER(S): 9 INJECTION, SOLUTION INTRAVENOUS at 13:41

## 2023-07-16 NOTE — ED PROVIDER NOTE - OBJECTIVE STATEMENT
67 y/o female w/PMHx of SVT, OA, and HTN BIBEMS s/p fall off couch; has been on ground over 24 hrs. states on Thursday she had fall outside in the yard; was on the ground for 2hrs before she was able to get up. yesterday was on couch when they rolled on to floor; was on the floor until today when sister reached the house and called the ambulance. States R side feels weak. no HA, N/V, or diarrhea. when EMS arrived, tried helping pt ambulate but was unable to walk. 69 y/o female w/PMHx of SVT, OA, and HTN BIBEMS s/p fall off couch; has been on ground over 24 hrs. states on Thursday she had fall outside in the yard; was on the ground for 2hrs before she was able to get help. yesterday was on couch when they rolled on to floor; was on the floor until today.  Pt was able to reach out to sister and called the ambulance. States R side feels weak. no HA, N/V, or diarrhea. when EMS arrived, tried helping pt ambulate but was unable to walk.

## 2023-07-16 NOTE — ED PROVIDER NOTE - NSICDXPASTSURGICALHX_GEN_ALL_CORE_FT
PAST SURGICAL HISTORY:  H/O cardiac radiofrequency ablation     H/O right breast biopsy 2017    History of lumpectomy of right breast benign -2003    S/P colonoscopy

## 2023-07-16 NOTE — ED PROVIDER NOTE - CLINICAL SUMMARY MEDICAL DECISION MAKING FREE TEXT BOX
69 y/o female presents to the ED for weakness and fall x2; been on ground for 24 hrs. plan check labs, CT.

## 2023-07-16 NOTE — H&P ADULT - NSHPADDITIONALINFOADULT_GEN_ALL_CORE
R knee xray    MPRESSION:  Corticated bone fragment adjacent to the upper RIGHT femoral lateral   condyle concerning for avulsed fracture versus exostosis.. CT   confirmation recommended.

## 2023-07-16 NOTE — ED ADULT TRIAGE NOTE - CHIEF COMPLAINT QUOTE
Pt BIBEMS from home, c/o fall- on the ground for over 24 hours. Pt endorses R lower back pain, fatigue and generalized weakness. Denies head strike, LOC, or anticoagulant use. . Dr. Madden to triage. No trauma alert called. STAT EKG to be completed.

## 2023-07-16 NOTE — ED PROVIDER NOTE - SKIN, MLM
Skin normal color for race, warm, dry and intact. No evidence of rash. Skin normal color for race, warm,  bruising on back

## 2023-07-16 NOTE — H&P ADULT - NSHPPHYSICALEXAM_GEN_ALL_CORE
Vital Signs Last 24 Hrs  T(C): 36.8 (16 Jul 2023 23:00), Max: 40.1 (16 Jul 2023 12:30)  T(F): 98.2 (16 Jul 2023 23:00), Max: 104.2 (16 Jul 2023 12:30)  HR: 96 (16 Jul 2023 23:00) (76 - 99)  BP: 124/65 (16 Jul 2023 23:00) (102/52 - 133/66)  BP(mean): 65 (16 Jul 2023 15:03) (65 - 65)  RR: 19 (16 Jul 2023 23:00) (17 - 20)  SpO2: 94% (16 Jul 2023 23:00) (94% - 99%)    Parameters below as of 16 Jul 2023 23:00  Patient On (Oxygen Delivery Method): room air

## 2023-07-16 NOTE — H&P ADULT - NSHPOUTPATIENTPROVIDERS_GEN_ALL_CORE
PCP Dr. Konrad Qiu ONC Dr. Ponce PCP Dr. Konrad Qiu ONC Dr. Rosario CHAVEZ ONC Dr. Maira Luo  [facility] Dr. Dominguez

## 2023-07-16 NOTE — H&P ADULT - NSHPLABSRESULTS_GEN_ALL_CORE
NON-CON CT C/A/P    CHEST:    LUNGS AND LARGE AIRWAYS:   The tracheobronchial tree is patent. There is   a large airspace consolidation with surrounding groundglass is seen in   the apicoposterior segment of the left upper lobe. A 3 mm calcified   granuloma is seen in the right upper lobe. Subpleural scarring is seen at   the anterior right middle lobe, presumably from prior breast radiation.    PLEURA: Trace left pleural effusion. No pneumothorax.    VESSELS: Limited evaluation for acute traumatic aortic injury without IV   contrast; within this limitation, no obvious traumatic visceral injury.   The ascending and descending aorta are not enlarged. The pulmonary artery   is not enlarged.    HEART: Heart size is normal. No pericardial effusion.    MEDIASTINUM AND JAYDEN: No mediastinal hematoma. There is no mediastinal   lymphadenopathy.  Evaluation for hilar lymphadenopathy is limited without   IV contrast, however there is no gross hilar lymphadenopathy.    CHEST WALL AND LOWER NECK: There is no axillary lymphadenopathy.   Spiculated appearing scarring in the right breast may correlate with   patient's known history of right breast malignancy.      ABDOMEN/PELVIS:  LIVER: Unenhanced liver is enlarged, measuring up to 21 cm. There is   diffuse hepatic steatosis.  BILE DUCTS: Normal caliber.  GALLBLADDER: Within normal limits.  SPLEEN: Unenhanced spleen appears within normal limits.  PANCREAS: Unenhanced pancreas appears within normal limits.  ADRENALS: Unenhanced adrenals appear within normal limits.  KIDNEYS/URETERS: Unenhanced kidneys appear within normal limits. Ureters   are not dilated.    BLADDER: Within normal limits.    REPRODUCTIVE ORGANS: Uterus and left adnexa appear within normal limits.   There is a 2.2 cm probable right ovarian cyst.    BOWEL: No bowel obstruction. Normal appendix. Duodenal diverticulum is   seen extending from the third portion of the duodenum. Small hiatal   hernia.  PERITONEUM: No ascites.  VESSELS: Moderate aortoiliac atherosclerotic disease is seen without   aneurysm.  LYMPH NODES: No lymphadenopathy.  ABDOMINAL WALL: Small, bilateral fat-containing inguinal hernias.  BONES: There is a 5 mm ill-defined sclerotic focus in the right iliac   bone (image 126, series 2). No acute osseous fracture. Multilevel   degenerative changes are seen throughout the visualized spine.   Accentuation of thoracic kyphosis. Mild scoliotic curvature.  There is   diffuse osseous demineralization. Grade 1 anterolisthesis of L4 on L5.  OTHER:  None    IMPRESSION:  Large airspace consolidation with surrounding groundglass in the   apicoposterior segment of the LEFT upper lobe. Trace left pleural   effusion. This finding could represent pneumonia in the appropriate   clinical setting. Additional differential includes pulmonary infarction   (presence or absence of pulmonary embolism is not assessed on the current   noncontrast exam - CT angiogram can be obtained if indicated) versus   pulmonary contusion in the setting of blunt trauma. Recommend clinical   correlation and follow-up imaging to document resolution and exclude the   possibility of underlying malignancy.    Otherwise, no acute pathology identified in the chest, abdomen or pelvis.   Note that lack of IV contrast limits evaluation for acute traumatic   visceral and vascular injury.  No acute osseous fracture.    There is a 5 mm ill-defined sclerotic focus in the right iliac bone   (image 126, series 2). Although this finding is probably benign, given   history of breast cancer, metastatic disease is not reliably excluded.   Consider bone scan for further evaluation.    Hepatomegaly. Diffuse hepatic steatosis.    2.2 cm probable right ovarian cyst, amenable to sonographic correlation.    Spiculated appearing scarring in the right breast may correlate with   patient's known history of right breast malignancy.    Small hiatal hernia.    Other findings, as above NON-CON CT C/A/P  CHEST:  LUNGS AND LARGE AIRWAYS:   The tracheobronchial tree is patent. There is   a large airspace consolidation with surrounding groundglass is seen in   the apicoposterior segment of the left upper lobe. A 3 mm calcified   granuloma is seen in the right upper lobe. Subpleural scarring is seen at   the anterior right middle lobe, presumably from prior breast radiation.    PLEURA: Trace left pleural effusion. No pneumothorax.    VESSELS: Limited evaluation for acute traumatic aortic injury without IV   contrast; within this limitation, no obvious traumatic visceral injury.   The ascending and descending aorta are not enlarged. The pulmonary artery   is not enlarged.    HEART: Heart size is normal. No pericardial effusion.  MEDIASTINUM AND JAYDEN: No mediastinal hematoma. There is no mediastinal   lymphadenopathy.  Evaluation for hilar lymphadenopathy is limited without   IV contrast, however there is no gross hilar lymphadenopathy.    CHEST WALL AND LOWER NECK: There is no axillary lymphadenopathy.   Spiculated appearing scarring in the right breast may correlate with   patient's known history of right breast malignancy.    ABDOMEN/PELVIS:  LIVER: Unenhanced liver is enlarged, measuring up to 21 cm. There is   diffuse hepatic steatosis.  BILE DUCTS: Normal caliber.  GALLBLADDER: Within normal limits.  SPLEEN: Unenhanced spleen appears within normal limits.  PANCREAS: Unenhanced pancreas appears within normal limits.  ADRENALS: Unenhanced adrenals appear within normal limits.  KIDNEYS/URETERS: Unenhanced kidneys appear within normal limits. Ureters are not dilated.  BLADDER: Within normal limits.    REPRODUCTIVE ORGANS: Uterus and left adnexa appear within normal limits. There is a 2.2 cm probable right ovarian cyst.    BOWEL: No bowel obstruction. Normal appendix. Duodenal diverticulum is   seen extending from the third portion of the duodenum. Small hiatal   hernia.  PERITONEUM: No ascites.  VESSELS: Moderate aortoiliac atherosclerotic disease is seen without  aneurysm.  LYMPH NODES: No lymphadenopathy.  ABDOMINAL WALL: Small, bilateral fat-containing inguinal hernias.  BONES: There is a 5 mm ill-defined sclerotic focus in the right iliac   bone (image 126, series 2). No acute osseous fracture. Multilevel   degenerative changes are seen throughout the visualized spine.   Accentuation of thoracic kyphosis. Mild scoliotic curvature.  There is   diffuse osseous demineralization. Grade 1 anterolisthesis of L4 on L5.  OTHER:  None    IMPRESSION:  Large airspace consolidation with surrounding groundglass in the   apicoposterior segment of the LEFT upper lobe. Trace left pleural   effusion. This finding could represent pneumonia in the appropriate   clinical setting. Additional differential includes pulmonary infarction   (presence or absence of pulmonary embolism is not assessed on the current   noncontrast exam - CT angiogram can be obtained if indicated) versus   pulmonary contusion in the setting of blunt trauma. Recommend clinical   correlation and follow-up imaging to document resolution and exclude the   possibility of underlying malignancy.    Otherwise, no acute pathology identified in the chest, abdomen or pelvis.   Note that lack of IV contrast limits evaluation for acute traumatic   visceral and vascular injury.  No acute osseous fracture.    There is a 5 mm ill-defined sclerotic focus in the right iliac bone   (image 126, series 2). Although this finding is probably benign, given   history of breast cancer, metastatic disease is not reliably excluded.   Consider bone scan for further evaluation.    Hepatomegaly. Diffuse hepatic steatosis.    2.2 cm probable right ovarian cyst, amenable to sonographic correlation.    Spiculated appearing scarring in the right breast may correlate with   patient's known history of right breast malignancy.    Small hiatal hernia.    Other findings as above  ___________________________________________

## 2023-07-16 NOTE — ED PROVIDER NOTE - MUSCULOSKELETAL, MLM
Spine appears normal, mild TTP R side body, 4/5 strength throughout. Spine appears normal, mild TTP R side body, 4/5 strength throughout.  bruising on knees bilateral , mild pain palp, distal n/v/m intact

## 2023-07-16 NOTE — ED PROVIDER NOTE - CARE PLAN
1 Principal Discharge DX:	Rhabdomyolysis  Secondary Diagnosis:	Pneumonia  Secondary Diagnosis:	Elevated troponin

## 2023-07-16 NOTE — ED PROVIDER NOTE - PROGRESS NOTE DETAILS
Attending Chano, d/w rhett Reyes trop can be from [facility],   with no chest pain and normal ekg.  trend trop, lovenox one dose

## 2023-07-16 NOTE — H&P ADULT - HISTORY OF PRESENT ILLNESS
68 year old female w SVT, OA, and HTN BIBEMS s/p fall off couch    on Thursday she had fall outside in the yard  on the ground for 2hrs before she was able to get up.   yesterday was on couch when they rolled on to floor; was on the floor until today when sister reached the house and called the ambulance.   States R side feels weak. no HA, N/V, or diarrhea. when EMS arrived, tried helping pt ambulate but was unable to walk.        In ED /50   HR 92   RR 17   T 100.4   94% RA     . 68 year old female w AF s/p ablation, R breast CA 2017 on letrozole, depression, HTN, neuropathy R foot, and OA, BIBEMS s/p falls      Thursday 07-13 she fell outside in her yard as she was out w her 2 dogs; fell backwards; no head injury or LOC  on the ground for 2 hrs before she was able to get up; scooted to her door  Yesterday was on couch when she rolled on to floor; scooted on her knees and arms to toilet but could not get up   was on the floor until her sister came;  finally pt called the ambulance    when EMS arrived, tried helping pt ambulate but was unable to walk.    pt reports increasing fatigue over past few days  +occasional cough  Wednesday 07-12 her friend visited; no known sick contacts but friend has  grandchildren        In ED /50   HR 92   RR 17   T 100.4   94% RA     .  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 and a Brennon

## 2023-07-16 NOTE — ED PROVIDER NOTE - NSICDXPASTMEDICALHX_GEN_ALL_CORE_FT
PAST MEDICAL HISTORY:  Breast lump benign    Breast pain, right     Breast skin changes right breast    Essential hypertension     Malignant neoplasm of right female breast, unspecified site of breast     Microscopic hematuria     Osteoarthritis knees    Rosacea     SVT (supraventricular tachycardia)

## 2023-07-16 NOTE — ED ADULT TRIAGE NOTE - GLASGOW COMA SCALE: BEST MOTOR RESPONSE, MLM
Chief Complaint   Patient presents with   • Acne     Referred from:  Gaurav Rasmussen MD / PCP:  Moe Figueredo MD     History of Present Illness:  Poncho Das is a presents with his mother for a chief complaint of acne. He states this is present on his face, chest, and back.  Mother believes he may have some scarring. He was prescribed doxy 100mg bid. The patient did not take this medication, as he is unable to swallow pills. He has been applying tretinoin cream three to four times weekly.  He does use \"L'sonny products\" for acne and moisturizer. Mother does state these products to seem to help.     Past Dermatologic Specific History:  None    Family History/Social History:   He does  have a family history of skin cancer.  Maternal grandmother has h/o melanoma per patient's mother.    reports that he has never smoked. He has never used smokeless tobacco.      Medications, the past medical and surgical history were reviewed in the electronic medical record and updated as necessary.     Review of Systems:   General:  No fevers, no chills, no unintentional weight loss.  Skin:  No other skin complaints.    Physical Examination:    Constitutional:  Well developed, well nourished, in no acute distress.    Neurologic and Psychiatric:  He has an appropriate mood and affect.  Alert and oriented x3 with no gross neurological defects.    Musculoskeletal:  Normal gait.  Ocular:  Normal eyelids and conjunctivae.   SKIN:  Examination of the chest, abdomen, arms, hands, neck, scalp and face revealed the following significant findings:     Erythematous, acneiform papules, with pustules, without comedones, with scarring on the cheeks, forehead, chin, superior shoulders, chest, back.   Extensive  Cystic.       Assessment and Plan:  Discussed options with mother  The patient cannot swallow pills  It is OK to take isotretinoin capsules and empty them into food  They already have a prescription at home and will trial this, return in  a month  Other options are doxycycline syrup or tabs.     iPledge #: [id number]    Poncho was seen today for acne.    Diagnoses and all orders for this visit:    Cystic acne           Return in about 1 month (around 7/19/2019) for Isotrentinoin.          On 6/18/2019, Hedy MILLAN SCRIBE scribed the services personally performed by Gaurav Rasmussen MD The documentation recorded by the scribe accurately and completely reflects the service(s) I personally performed and the decisions made by me.        (M6) obeys commands

## 2023-07-16 NOTE — ED ADULT NURSE NOTE - OBJECTIVE STATEMENT
Pt BIBA s/p slipping off the couch and falling on the floor at 12pm yesterday. Pt reports she also had a fall on Thursday 7/13/23 and was unable to get up for 2 hours. Pt denies LOC, hitting head in either fall or anticoagulation use. Pt lives alone. Pt states she does not use an assistive device to ambulate. Pt states she has been feeling weak the last few days. Pt undressed, EKG performed, Rectal temp 104.2. Pt is A&Ox4. Pt reports no pain at rest and 10/10 pain with movement. Pt has bruising on buttock, b/l knees and left elbow.

## 2023-07-16 NOTE — H&P ADULT - ASSESSMENT
68 year old female w AF s/p ablation, R breast CA 2017 on letrozole, depression, HTN, neuropathy R foot, and OA, BIBEMS s/p falls      #Elevated troponin  EKG  1. admit to telemtry  2 serial trop  3. lovenox 100 mg q 24  4. echo  5. cardiology consult pending      #Acute rhabdomyolysis  CK 65K, repeat 40 K  1. ILR 1200 cc in ED  2. NS 1000 cc in ED  3. trend CPK  4. continue NS @ 125 cc  5. renal consult      #Falls : 3 reported  head CT and CT Csp no acute changes  1. PT consut  2. B12 in AM  3. TFT      #PNA KARELY  no fever reported  +occasional cough  RVP neg  1. cefepime 2 g in ED and q 8 hrs  2. vanco x 1 in ED; wanted to avoid nephrotoxic agents      #Hx ablation for AFIB  current EKG NSR @ 95      #Depression  1. venlafaxine      #HTN  1. BB w parameters      #Hx breast CA  1. letrozole        #VTE  on lovenox for above           68 year old female w AF s/p ablation, R breast CA 2017 on letrozole, depression, HTN, neuropathy R foot, and OA, BIBEMS s/p falls      #Elevated troponin  EKG  1. admit to telemtry  2 serial trop  3. lovenox 100 mg q 24  4. echo  5. cardiology consult pending      #Acute rhabdomyolysis  CK 65K, repeat 40 K  1. ILR 1200 cc in ED  2. NS 1000 cc in ED  3. trend CPK  4. continue NS @ 125 cc  5. renal consult      #Falls : 3 reported  head CT and CT Csp no acute changes  1. PT consut  2. B12 in AM  3. TFT  4. R knee report is as below      #PNA KARELY  no fever reported  +occasional cough  RVP neg  1. cefepime 2 g in ED and q 8 hrs  2. vanco x 1 in ED; wanted to avoid nephrotoxic agents      #Abrasion : RUE  #Lymphedema  1. local care      #Hx ablation for AFIB  current EKG NSR @ 95      #Depression  1. venlafaxine      #HTN  1. BB w parameters      #Hx breast CA  1. letrozole        #VTE  on lovenox for above           68 year old female w AF s/p ablation, R breast CA 2017 on letrozole, depression, HTN, neuropathy R foot, and OA, BIBEMS s/p falls      #Elevated troponin  EKG  1. admit to telemtry  2 serial trop  3. lovenox 100 mg q 24  4. echo  5. cardiology consult pending      #Acute rhabdomyolysis  CK 65K, repeat 40 K  1. ILR 1200 cc in ED  2. NS 1000 cc in ED  3. trend CPK  4. continue NS @ 125 cc  5. renal consult      #Elevated lactic acid  likely due to volume repletion  1. trend      #Falls : 3 reported  head CT and CT Csp no acute changes  1. PT consut  2. B12 in AM  3. TFT  4. R knee report is as below      #PNA KARELY  no fever reported  +occasional cough  RVP neg  1. cefepime 2 g in ED and q 8 hrs  2. vanco x 1 in ED; wanted to avoid nephrotoxic agents        #Hepatomegaly  #Steatosis  #Elevated LFTs  1. trend LFTs  2. check lipids          #Abrasion : RUE  #Lymphedema  1. local care      #Hx ablation for AFIB  current EKG NSR @ 95      #Depression  1. venlafaxine      #HTN  1. BB w parameters      #Hx breast CA  1. letrozole        #VTE  on lovenox for above

## 2023-07-16 NOTE — ED ADULT NURSE NOTE - NSFALLRISKINTERV_ED_ALL_ED

## 2023-07-17 LAB
24R-OH-CALCIDIOL SERPL-MCNC: 30.2 NG/ML — SIGNIFICANT CHANGE UP (ref 30–80)
A1C WITH ESTIMATED AVERAGE GLUCOSE RESULT: 6.4 % — HIGH (ref 4–5.6)
ADD ON TEST-SPECIMEN IN LAB: SIGNIFICANT CHANGE UP
ALBUMIN SERPL ELPH-MCNC: 2.3 G/DL — LOW (ref 3.3–5)
ALP SERPL-CCNC: 64 U/L — SIGNIFICANT CHANGE UP (ref 40–120)
ALT FLD-CCNC: 290 U/L — HIGH (ref 12–78)
ANION GAP SERPL CALC-SCNC: 7 MMOL/L — SIGNIFICANT CHANGE UP (ref 5–17)
AST SERPL-CCNC: 770 U/L — HIGH (ref 15–37)
BASOPHILS # BLD AUTO: 0.01 K/UL — SIGNIFICANT CHANGE UP (ref 0–0.2)
BASOPHILS NFR BLD AUTO: 0.1 % — SIGNIFICANT CHANGE UP (ref 0–2)
BILIRUB SERPL-MCNC: 0.5 MG/DL — SIGNIFICANT CHANGE UP (ref 0.2–1.2)
BUN SERPL-MCNC: 17 MG/DL — SIGNIFICANT CHANGE UP (ref 7–23)
CALCIUM SERPL-MCNC: 8 MG/DL — LOW (ref 8.5–10.1)
CHLORIDE SERPL-SCNC: 105 MMOL/L — SIGNIFICANT CHANGE UP (ref 96–108)
CHOLEST SERPL-MCNC: 112 MG/DL — SIGNIFICANT CHANGE UP
CK SERPL-CCNC: CRITICAL HIGH U/L (ref 26–192)
CK SERPL-CCNC: CRITICAL HIGH U/L (ref 26–192)
CK SERPL-CCNC: SIGNIFICANT CHANGE UP U/L (ref 26–192)
CO2 SERPL-SCNC: 22 MMOL/L — SIGNIFICANT CHANGE UP (ref 22–31)
CREAT SERPL-MCNC: 1.03 MG/DL — SIGNIFICANT CHANGE UP (ref 0.5–1.3)
CULTURE RESULTS: NO GROWTH — SIGNIFICANT CHANGE UP
EGFR: 58 ML/MIN/1.73M2 — LOW
EOSINOPHIL # BLD AUTO: 0 K/UL — SIGNIFICANT CHANGE UP (ref 0–0.5)
EOSINOPHIL NFR BLD AUTO: 0 % — SIGNIFICANT CHANGE UP (ref 0–6)
ESTIMATED AVERAGE GLUCOSE: 137 MG/DL — HIGH (ref 68–114)
GLUCOSE SERPL-MCNC: 125 MG/DL — HIGH (ref 70–99)
HAV IGM SER-ACNC: SIGNIFICANT CHANGE UP
HBV CORE IGM SER-ACNC: SIGNIFICANT CHANGE UP
HBV SURFACE AG SER-ACNC: SIGNIFICANT CHANGE UP
HCT VFR BLD CALC: 35.3 % — SIGNIFICANT CHANGE UP (ref 34.5–45)
HCV AB S/CO SERPL IA: 0.06 S/CO — SIGNIFICANT CHANGE UP (ref 0–0.99)
HCV AB SERPL-IMP: SIGNIFICANT CHANGE UP
HDLC SERPL-MCNC: 29 MG/DL — LOW
HGB BLD-MCNC: 12.4 G/DL — SIGNIFICANT CHANGE UP (ref 11.5–15.5)
IMM GRANULOCYTES NFR BLD AUTO: 0.5 % — SIGNIFICANT CHANGE UP (ref 0–0.9)
LACTATE SERPL-SCNC: 1.3 MMOL/L — SIGNIFICANT CHANGE UP (ref 0.7–2)
LIPID PNL WITH DIRECT LDL SERPL: 61 MG/DL — SIGNIFICANT CHANGE UP
LYMPHOCYTES # BLD AUTO: 0.81 K/UL — LOW (ref 1–3.3)
LYMPHOCYTES # BLD AUTO: 10.6 % — LOW (ref 13–44)
MCHC RBC-ENTMCNC: 30.7 PG — SIGNIFICANT CHANGE UP (ref 27–34)
MCHC RBC-ENTMCNC: 35.1 GM/DL — SIGNIFICANT CHANGE UP (ref 32–36)
MCV RBC AUTO: 87.4 FL — SIGNIFICANT CHANGE UP (ref 80–100)
MONOCYTES # BLD AUTO: 0.36 K/UL — SIGNIFICANT CHANGE UP (ref 0–0.9)
MONOCYTES NFR BLD AUTO: 4.7 % — SIGNIFICANT CHANGE UP (ref 2–14)
NEUTROPHILS # BLD AUTO: 6.4 K/UL — SIGNIFICANT CHANGE UP (ref 1.8–7.4)
NEUTROPHILS NFR BLD AUTO: 84.1 % — HIGH (ref 43–77)
NON HDL CHOLESTEROL: 84 MG/DL — SIGNIFICANT CHANGE UP
PLATELET # BLD AUTO: 227 K/UL — SIGNIFICANT CHANGE UP (ref 150–400)
POTASSIUM SERPL-MCNC: 3.4 MMOL/L — LOW (ref 3.5–5.3)
POTASSIUM SERPL-SCNC: 3.4 MMOL/L — LOW (ref 3.5–5.3)
PROT SERPL-MCNC: 6.1 GM/DL — SIGNIFICANT CHANGE UP (ref 6–8.3)
RBC # BLD: 4.04 M/UL — SIGNIFICANT CHANGE UP (ref 3.8–5.2)
RBC # FLD: 13.2 % — SIGNIFICANT CHANGE UP (ref 10.3–14.5)
SODIUM SERPL-SCNC: 134 MMOL/L — LOW (ref 135–145)
SPECIMEN SOURCE: SIGNIFICANT CHANGE UP
T3 SERPL-MCNC: 47 NG/DL — LOW (ref 80–200)
T4 AB SER-ACNC: 4.1 UG/DL — LOW (ref 4.6–12)
TRIGL SERPL-MCNC: 129 MG/DL — SIGNIFICANT CHANGE UP
TROPONIN I, HIGH SENSITIVITY RESULT: 8752.7 NG/L — HIGH
TROPONIN I, HIGH SENSITIVITY RESULT: 9500.45 NG/L — HIGH
TSH SERPL-MCNC: 0.72 UU/ML — SIGNIFICANT CHANGE UP (ref 0.34–4.82)
VIT B12 SERPL-MCNC: 628 PG/ML — SIGNIFICANT CHANGE UP (ref 232–1245)
WBC # BLD: 7.62 K/UL — SIGNIFICANT CHANGE UP (ref 3.8–10.5)
WBC # FLD AUTO: 7.62 K/UL — SIGNIFICANT CHANGE UP (ref 3.8–10.5)

## 2023-07-17 PROCEDURE — 93306 TTE W/DOPPLER COMPLETE: CPT | Mod: 26

## 2023-07-17 PROCEDURE — 99223 1ST HOSP IP/OBS HIGH 75: CPT

## 2023-07-17 PROCEDURE — 99233 SBSQ HOSP IP/OBS HIGH 50: CPT

## 2023-07-17 PROCEDURE — 73700 CT LOWER EXTREMITY W/O DYE: CPT | Mod: 26,RT

## 2023-07-17 PROCEDURE — 76376 3D RENDER W/INTRP POSTPROCES: CPT | Mod: 26

## 2023-07-17 RX ORDER — ENOXAPARIN SODIUM 100 MG/ML
40 INJECTION SUBCUTANEOUS EVERY 24 HOURS
Refills: 0 | Status: DISCONTINUED | OUTPATIENT
Start: 2023-07-18 | End: 2023-07-18

## 2023-07-17 RX ORDER — CEFTRIAXONE 500 MG/1
1000 INJECTION, POWDER, FOR SOLUTION INTRAMUSCULAR; INTRAVENOUS EVERY 24 HOURS
Refills: 0 | Status: DISCONTINUED | OUTPATIENT
Start: 2023-07-17 | End: 2023-07-17

## 2023-07-17 RX ORDER — CEFTRIAXONE 500 MG/1
1000 INJECTION, POWDER, FOR SOLUTION INTRAMUSCULAR; INTRAVENOUS EVERY 24 HOURS
Refills: 0 | Status: COMPLETED | OUTPATIENT
Start: 2023-07-17 | End: 2023-07-21

## 2023-07-17 RX ORDER — POTASSIUM CHLORIDE 20 MEQ
40 PACKET (EA) ORAL ONCE
Refills: 0 | Status: COMPLETED | OUTPATIENT
Start: 2023-07-17 | End: 2023-07-17

## 2023-07-17 RX ORDER — IBUPROFEN 200 MG
400 TABLET ORAL ONCE
Refills: 0 | Status: COMPLETED | OUTPATIENT
Start: 2023-07-17 | End: 2023-07-17

## 2023-07-17 RX ORDER — AZITHROMYCIN 500 MG/1
500 TABLET, FILM COATED ORAL ONCE
Refills: 0 | Status: COMPLETED | OUTPATIENT
Start: 2023-07-17 | End: 2023-07-17

## 2023-07-17 RX ORDER — SODIUM CHLORIDE 9 MG/ML
1000 INJECTION INTRAMUSCULAR; INTRAVENOUS; SUBCUTANEOUS
Refills: 0 | Status: DISCONTINUED | OUTPATIENT
Start: 2023-07-17 | End: 2023-07-19

## 2023-07-17 RX ORDER — AZITHROMYCIN 500 MG/1
500 TABLET, FILM COATED ORAL EVERY 24 HOURS
Refills: 0 | Status: COMPLETED | OUTPATIENT
Start: 2023-07-18 | End: 2023-07-19

## 2023-07-17 RX ORDER — AZITHROMYCIN 500 MG/1
TABLET, FILM COATED ORAL
Refills: 0 | Status: COMPLETED | OUTPATIENT
Start: 2023-07-17 | End: 2023-07-19

## 2023-07-17 RX ADMIN — SODIUM CHLORIDE 100 MILLILITER(S): 9 INJECTION INTRAMUSCULAR; INTRAVENOUS; SUBCUTANEOUS at 10:46

## 2023-07-17 RX ADMIN — Medication 400 MILLIGRAM(S): at 23:29

## 2023-07-17 RX ADMIN — Medication 150 MILLIGRAM(S): at 10:47

## 2023-07-17 RX ADMIN — ENOXAPARIN SODIUM 100 MILLIGRAM(S): 100 INJECTION SUBCUTANEOUS at 10:46

## 2023-07-17 RX ADMIN — CEFTRIAXONE 1000 MILLIGRAM(S): 500 INJECTION, POWDER, FOR SOLUTION INTRAMUSCULAR; INTRAVENOUS at 10:48

## 2023-07-17 RX ADMIN — Medication 75 MILLIGRAM(S): at 10:47

## 2023-07-17 RX ADMIN — Medication 40 MILLIEQUIVALENT(S): at 10:47

## 2023-07-17 RX ADMIN — LETROZOLE 2.5 MILLIGRAM(S): 2.5 TABLET, FILM COATED ORAL at 10:47

## 2023-07-17 RX ADMIN — CEFEPIME 100 MILLIGRAM(S): 1 INJECTION, POWDER, FOR SOLUTION INTRAMUSCULAR; INTRAVENOUS at 06:01

## 2023-07-17 RX ADMIN — AZITHROMYCIN 255 MILLIGRAM(S): 500 TABLET, FILM COATED ORAL at 10:47

## 2023-07-17 NOTE — CONSULT NOTE ADULT - ASSESSMENT
patient with elevated troponin levels in face of fall/rhabdomyolysis  1-CAD-no ekg changes or chest pains; get ECHO-if any abnormal wall motion, will get cardiac cath;  2-CHF-no signs of CHF  3-arrhythmia-continue telemetry if any NSVT, will consider inpatient ischemic evaluation after ECHO, if none, will do nuclear stress test as outpatient

## 2023-07-17 NOTE — PROGRESS NOTE ADULT - ASSESSMENT
68 year old female w AF s/p ablation, R breast CA 2017 on letrozole, depression, HTN, neuropathy R foot, and OA, BIBEMS s/p falls.    #Elevated troponin, likely 2/2 rhabdomyolysis vs demand ischemia  -downtrending  -echo pending  -s/p 24hr full a/c w lovenox, will d/c  -f/u cards recs    #rhabdomyolysis 2/2 fall w time down  -orthostatics  -CPK downtrending  -Cr ok, trend  -continue IVF  -appreciate renal recs  -PT eval    #transaminitis  -likely 2/2 rhabdo vs dehydration in setting of underlying hepatic steatosis  -trend    #Severe sepsis w lactic acidosis 2/2 CAP  -on RA  -lactate downtrending  -BCx pending  -Chest imaging as above, will need repeat in 6wks  -Azithro/CTX      #Hx breast CA  -onc consulted given findings on CT, ?bone met    #DVT ppx- SQL     Medically active 68 year old female w AF s/p ablation, R breast CA 2017 on letrozole, depression, HTN, neuropathy R foot, and OA, BIBEMS s/p falls.    #Elevated troponin, likely 2/2 rhabdomyolysis vs demand ischemia  -downtrending  -echo read pending  -s/p 24hr full a/c w lovenox, will d/c  -f/u cards recs    #rhabdomyolysis 2/2 fall w time down  -orthostatics neg  -CPK downtrending  -Cr ok, trend  -continue IVF  -appreciate renal recs  -PT eval    #transaminitis  -likely 2/2 rhabdo vs dehydration in setting of underlying hepatic steatosis  -trend    #Severe sepsis w lactic acidosis 2/2 CAP  -on RA  -lactate downtrending  -BCx pending  -Chest imaging as above, will need repeat in 6wks  -Azithro/CTX      #Hx breast CA  -onc consulted given findings on CT, ?bone met    #DVT ppx- SQL     Medically active 68 year old female w AF s/p ablation, R breast CA 2017 on letrozole, depression, HTN, neuropathy R foot, and OA, BIBEMS s/p falls.    #Elevated troponin, likely 2/2 rhabdomyolysis vs demand ischemia  -downtrending  -echo read pending  -s/p 24hr full a/c w lovenox, will d/c  -f/u cards recs    #rhabdomyolysis 2/2 fall w time down  -orthostatics neg  -CPK downtrending  -Cr ok, trend  -continue IVF  -appreciate renal recs  -PT eval    #transaminitis  -likely 2/2 rhabdo vs dehydration in setting of underlying hepatic steatosis  -trend    #Severe sepsis w lactic acidosis 2/2 CAP  -on RA  -lactate downtrending  -BCx pending  -Chest imaging as above, will need repeat in 6wks  -Azithro/CTX    #Hx breast CA  -onc consulted given findings on CT, ?bone met    #R ovarian cyst  -outpt u/s to f/u    #DVT ppx- SQL     Medically active

## 2023-07-17 NOTE — PROGRESS NOTE ADULT - SUBJECTIVE AND OBJECTIVE BOX
HOSPITALIST ATTENDING PROGRESS NOTE    Chart and meds reviewed.  Patient seen and examined.    CC: fall    Subjective: Reports cough, weakness, LE pain.    All other systems reviewed and found to be negative with the exception of what has been described above.    MEDICATIONS  (STANDING):  azithromycin  IVPB      cefTRIAXone Injectable. 1000 milliGRAM(s) IV Push every 24 hours  enoxaparin Injectable 100 milliGRAM(s) SubCutaneous every 24 hours  letrozole 2.5 milliGRAM(s) Oral daily  metoprolol succinate ER 75 milliGRAM(s) Oral daily  sodium chloride 0.9%. 1000 milliLiter(s) (100 mL/Hr) IV Continuous <Continuous>  venlafaxine XR. 150 milliGRAM(s) Oral daily    MEDICATIONS  (PRN):  acetaminophen     Tablet .. 650 milliGRAM(s) Oral every 6 hours PRN Mild Pain (1 - 3)  aluminum hydroxide/magnesium hydroxide/simethicone Suspension 30 milliLiter(s) Oral every 4 hours PRN Dyspepsia  melatonin 3 milliGRAM(s) Oral at bedtime PRN Insomnia  ondansetron Injectable 4 milliGRAM(s) IV Push every 6 hours PRN Nausea and/or Vomiting      VITALS:  T(F): 98.3 (07-17-23 @ 09:41), Max: 98.3 (07-17-23 @ 08:08)  HR: 85 (07-17-23 @ 08:08) (76 - 96)  BP: 105/56 (07-17-23 @ 08:08) (102/52 - 124/65)  RR: 18 (07-17-23 @ 08:08) (18 - 20)  SpO2: 94% (07-17-23 @ 08:08) (94% - 99%)  Wt(kg): --    I&O's Summary      CAPILLARY BLOOD GLUCOSE          PHYSICAL EXAM:  Gen: NAD  HEENT:  pupils equal and reactive, EOMI, no oropharyngeal lesions, erythema, exudates, oral thrush  NECK:   supple, no carotid bruits, no palpable lymph nodes, no thyromegaly  CV:  +S1, +S2, regular, no murmurs or rubs  RESP:   lungs clear to auscultation bilaterally, no wheezing, rales, rhonchi, good air entry bilaterally  BREAST:  not examined  GI:  abdomen soft, non-tender, non-distended, normal BS, no bruits, no abdominal masses, no palpable masses  RECTAL:  not examined  :  not examined  MSK:   normal muscle tone, no atrophy, no rigidity, no contractions  EXT:  +bruising on LE b/l  VASCULAR:  pulses equal and symmetric in the upper and lower extremities  NEURO:  AAOX3, no focal neurological deficits, follows all commands, able to move extremities spontaneously  SKIN:  no ulcers, lesions or rashes    LABS:                            12.4   7.62  )-----------( 227      ( 17 Jul 2023 05:49 )             35.3     07-17    134<L>  |  105  |  17  ----------------------------<  125<H>  3.4<L>   |  22  |  1.03    Ca    8.0<L>      17 Jul 2023 05:49  Phos  1.9     07-16  Mg     2.2     07-16    TPro  6.1  /  Alb  2.3<L>  /  TBili  0.5  /  DBili  x   /  AST  770<H>  /  ALT  290<H>  /  AlkPhos  64  07-17    CARDIAC MARKERS ( 17 Jul 2023 13:49 )  x     / x     / 99702 U/L / x     / x      CARDIAC MARKERS ( 17 Jul 2023 05:49 )  x     / x     / 37595 U/L / x     / x      CARDIAC MARKERS ( 16 Jul 2023 23:12 )  x     / x     / 85020 U/L / x     / x      CARDIAC MARKERS ( 16 Jul 2023 12:53 )  x     / x     / 77224 U/L / x     / x          LIVER FUNCTIONS - ( 17 Jul 2023 05:49 )  Alb: 2.3 g/dL / Pro: 6.1 gm/dL / ALK PHOS: 64 U/L / ALT: 290 U/L / AST: 770 U/L / GGT: x           PT/INR - ( 16 Jul 2023 12:53 )   PT: 13.9 sec;   INR: 1.20 ratio         PTT - ( 16 Jul 2023 12:53 )  PTT:24.7 sec  Urinalysis Basic - ( 17 Jul 2023 05:49 )    Color: x / Appearance: x / SG: x / pH: x  Gluc: 125 mg/dL / Ketone: x  / Bili: x / Urobili: x   Blood: x / Protein: x / Nitrite: x   Leuk Esterase: x / RBC: x / WBC x   Sq Epi: x / Non Sq Epi: x / Bacteria: x        Lactate, Blood: 1.3 mmol/L (07-17 @ 10:22)  Lactate, Blood: 3.0 mmol/L (07-16 @ 23:12)    CULTURES:  BCx pending    Additional results/Imaging, I have personally reviewed:  CTH, CT cspine 7/16/23: CT HEAD: No CT evidence of acute intracranial hemorrhage. Atherosclerotic changes. If patient demonstrates persistent headaches or altered mental status, consider further evaluation via MR imaging to include DWI and ADC mapping techniques, along with gradient echo acquisition technique, provided there are no contraindications. CT CERVICAL SPINE:  No acute fracture. No AP plane subluxation. Reversal of normally visualized cervical lordosis. Multilevel degenerative changes, as described level by level in detail above.  If there is clinical concern for myelopathy or radiculopathy, consider further evaluation via MR imaging of the cervical spine, provided the patient has no contraindications.    CT c/a/p noncon 7/16/23: Large airspace consolidation with surrounding groundglass in the apicoposterior segment of the LEFT upper lobe. Trace left pleural effusion. This finding could represent pneumonia in the appropriate clinical setting. Additional differential includes pulmonary infarction (presence or absence of pulmonary embolism is not assessed on the current noncontrast exam - CT angiogram can be obtained if indicated) versus pulmonary contusion in the setting of blunt trauma. Recommend clinical correlation and follow-up imaging to document resolution and exclude the possibility of underlying malignancy. Otherwise, no acute pathology identified in the chest, abdomen or pelvis. Note that lack of IV contrast limits evaluation for acute traumatic visceral and vascular injury.  No acute osseous fracture. There is a 5 mm ill-defined sclerotic focus in the right iliac bone (image 126, series 2). Although this finding is probably benign, given history of breast cancer, metastatic disease is not reliably excluded. Consider bone scan for further evaluation. Hepatomegaly. Diffuse hepatic steatosis. 2.2 cm probable right ovarian cyst, amenable to sonographic correlation. Spiculated appearing scarring in the right breast may correlate with patient's known history of right breast malignancy. Small hiatal hernia.    B/l knee xray 7/16/23: Corticated bone fragment adjacent to the upper RIGHT femoral lateral condyle concerning for avulsed fracture versus exostosis.. CT confirmation recommended.    CT R knee noncon 7/17/23:  No acute fracture or dislocation. Osteoarthritis.    Echo 7/17/23: done, read pending    Telemetry, personally reviewed:  7/17/23: sinus

## 2023-07-17 NOTE — CONSULT NOTE ADULT - SUBJECTIVE AND OBJECTIVE BOX
CHIEF COMPLAINT: Patient is a 68y old  Female who presents with a chief complaint of elevated troponin  acute rhabdomyolysis   falls (16 Jul 2023 22:06)      HPI:  68 year old female w AF s/p ablation, R breast CA 2017 on letrozole, depression, HTN, neuropathy R foot, and OA, BIBEMS s/p falls      Thursday 07-13 she fell outside in her yard as she was out w her 2 dogs; fell backwards; no head injury or LOC  on the ground for 2 hrs before she was able to get up; scooted to her door  Yesterday was on couch when she rolled on to floor; scooted on her knees and arms to toilet but could not get up   was on the floor until her sister came;  finally pt called the ambulance    when EMS arrived, tried helping pt ambulate but was unable to walk.    pt reports increasing fatigue over past few days  +occasional cough  Wednesday 07-12 her friend visited; no known sick contacts but friend has  grandchildren      In ED /50   HR 92   RR 17   T 100.4   94% RA     .  CT head no acute changes  CT Cspine DJD w narrowing  CT C/A/P  +KARELY PNA, +HM w steatosis, +sclerotic focus R iliac bone  R knee w DJD  cefepime  vanc    7/17-patient well known to me; has above medical history; did have multiple ECHO with normal LV function but no ischemic w/u in the past.   Very elevated troponin levels with in face of rhabdomyolysis with no chest pains ano EKG changes.       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  OA osteoarthritis of R knee   Venous stasis ulcer of right ankle limited to breakdown of skin with varicose veins      PAST SURGICAL HX	  Catheter Ablation 2016  Breast Surgery Lumpectomy 07/2003 and 05/2017  [place] lymph node biopsy  Breast biopsy  Complete colonoscopy 2017      FAMILY HX  CVA : Mother  COPD : Father  Coronary Art Dis : Father  Mantle cell Lymphoma : Sister  Pancreatic cancer : Grandfather  Prostate cancer : Cousin  Esophageal Cancer : Paternal Uncle        SOCIAL HX  never smoker  no alcohol  no drugs    lives alone w her 2 dogs : Hayti and gentry Willett (16 Jul 2023 22:06)      PMHx: PAST MEDICAL & SURGICAL HISTORY:  Breast lump  benign      Malignant neoplasm of right female breast, unspecified site of breast      Breast pain, right      Breast skin changes  right breast      Essential hypertension      SVT (supraventricular tachycardia)      Microscopic hematuria      Osteoarthritis  knees      Rosacea      H/O cardiac radiofrequency ablation      History of lumpectomy of right breast  benign -2003      H/O right breast biopsy  2017      S/P colonoscopy            Soc Hx:       Allergies: Allergies    red dye (Other)  Cipro (Other)    Intolerances    spinach (Vomiting)        REVIEW OF SYSTEMS:    CONSTITUTIONAL: weakness, no fevers or chills  EYES/ENT: No visual changes;  No vertigo or throat pain   NECK: No pain or stiffness  RESPIRATORY: No cough, wheezing, hemoptysis; No shortness of breath  CARDIOVASCULAR: No chest pain or palpitations  GASTROINTESTINAL: No abdominal or epigastric pain. No nausea, vomiting, or hematemesis; No diarrhea or constipation. No melena or hematochezia.  GENITOURINARY: No dysuria, frequency or hematuria      Vital Signs Last 24 Hrs  T(C): 36.8 (17 Jul 2023 09:41), Max: 40.1 (16 Jul 2023 12:30)  T(F): 98.3 (17 Jul 2023 09:41), Max: 104.2 (16 Jul 2023 12:30)  HR: 85 (17 Jul 2023 08:08) (76 - 99)  BP: 105/56 (17 Jul 2023 08:08) (102/52 - 133/66)  BP(mean): 65 (16 Jul 2023 15:03) (65 - 65)  RR: 18 (17 Jul 2023 08:08) (17 - 20)  SpO2: 94% (17 Jul 2023 08:08) (94% - 99%)    Parameters below as of 17 Jul 2023 08:08  Patient On (Oxygen Delivery Method): room air        I&O's Summary      CAPILLARY BLOOD GLUCOSE      POCT Blood Glucose.: 194 mg/dL (16 Jul 2023 12:20)      PHYSICAL EXAM:   Constitutional: NAD, awake and alert, well-developed  HEENT: PERR, EOMI, Normal Hearing, MMM  Neck: Soft and supple, No LAD, No JVD  Respiratory: Breath sounds are clear bilaterally, No wheezing, rales or rhonchi  Cardiovascular: S1 and S2, regular rate and rhythm, Murmurs, no gallops or rubs  Gastrointestinal: Bowel Sounds present, soft, nontender, nondistended, no guarding, no rebound  Extremities: No peripheral edema  Vascular: 2+ peripheral pulses      MEDICATIONS:  MEDICATIONS  (STANDING):  azithromycin  IVPB 500 milliGRAM(s) IV Intermittent once  azithromycin  IVPB      cefTRIAXone   IVPB 1000 milliGRAM(s) IV Intermittent every 24 hours  enoxaparin Injectable 100 milliGRAM(s) SubCutaneous every 24 hours  letrozole 2.5 milliGRAM(s) Oral daily  metoprolol succinate ER 75 milliGRAM(s) Oral daily  potassium chloride    Tablet ER 40 milliEquivalent(s) Oral once  sodium chloride 0.9%. 1000 milliLiter(s) (100 mL/Hr) IV Continuous <Continuous>  venlafaxine XR. 150 milliGRAM(s) Oral daily      LABS: All Labs Reviewed:                        12.4   7.62  )-----------( 227      ( 17 Jul 2023 05:49 )             35.3     07-17    134<L>  |  105  |  17  ----------------------------<  125<H>  3.4<L>   |  22  |  1.03    Ca    8.0<L>      17 Jul 2023 05:49  Phos  1.9     07-16  Mg     2.2     07-16    TPro  6.1  /  Alb  2.3<L>  /  TBili  0.5  /  DBili  x   /  AST  770<H>  /  ALT  290<H>  /  AlkPhos  64  07-17    PT/INR - ( 16 Jul 2023 12:53 )   PT: 13.9 sec;   INR: 1.20 ratio         PTT - ( 16 Jul 2023 12:53 )  PTT:24.7 sec  CARDIAC MARKERS ( 17 Jul 2023 05:49 )  x     / x     / 41578 U/L / x     / x      CARDIAC MARKERS ( 16 Jul 2023 23:12 )  x     / x     / 06325 U/L / x     / x      CARDIAC MARKERS ( 16 Jul 2023 12:53 )  x     / x     / 05956 U/L / x     / x            Blood Culture:   lipid profile lipid profile                          07-17 @ 05:49  cholesterol           112 mg/dL  Direct LDL            --  HDL cholesterol       29 mg/dL  HDL/Total cholesterol --  Triglycerides, serum  129 mg/dL        RADIOLOGY:  ACC: [id number] EXAM:  CT ABDOMEN AND PELVIS   ORDERED BY: JANEEN NUR     ACC: [id number] EXAM:  CT CHEST   ORDERED BY: JANEEN NUR     PROCEDURE DATE:  07/16/2023          INTERPRETATION:  CT CHEST, ABDOMEN AND PELVIS WITHOUT CONTRAST    CLINICAL INFORMATION: Status post fall, right-sided weakness and pain.     History of breast cancer.    Technique: Axial CT images of the chest, abdomen and pelvis were obtained   from the lung apices to the pubic symphysis without oral or IV contrast.    Coronal and sagittal reformatted images were created and reviewed.    Comparison:    Findings:  Lack of IV contrast limits evaluation for acute traumatic visceral and   vascular injury.    CHEST:    LUNGS AND LARGE AIRWAYS:   The tracheobronchial tree is patent. There is   a large airspace consolidation with surrounding groundglass is seen in   the apicoposterior segment of the left upper lobe. A 3 mm calcified   granuloma is seen in the right upper lobe. Subpleural scarring is seen at   the anterior right middle lobe, presumably from prior breast radiation.    PLEURA: Trace left pleural effusion. No pneumothorax.    VESSELS: Limited evaluation for acute traumatic aortic injury without IV   contrast; within this limitation, no obvious traumatic visceral injury.   The ascending and descending aorta are not enlarged. The pulmonary artery   is not enlarged.    HEART: Heart size is normal. No pericardial effusion.    MEDIASTINUM AND JAYDEN: No mediastinal hematoma. There is no mediastinal   lymphadenopathy.  Evaluation for hilar lymphadenopathy is limited without   IV contrast, however there is no gross hilar lymphadenopathy.    CHEST WALL AND LOWER NECK: There is no axillary lymphadenopathy.   Spiculated appearing scarring in the right breast may correlate with   patient's known history of right breast malignancy.      ABDOMEN/PELVIS:  LIVER: Unenhanced liver is enlarged, measuring up to 21 cm. There is   diffuse hepatic steatosis.  BILE DUCTS: Normal caliber.  GALLBLADDER: Within normal limits.  SPLEEN: Unenhanced spleen appears within normal limits.  PANCREAS: Unenhanced pancreas appears within normal limits.  ADRENALS: Unenhanced adrenals appear within normal limits.  KIDNEYS/URETERS: Unenhanced kidneys appear within normal limits. Ureters   are not dilated.    BLADDER: Within normal limits.    REPRODUCTIVE ORGANS: Uterus and left adnexa appear within normal limits.   There is a 2.2 cm probable right ovarian cyst.    BOWEL: No bowel obstruction. Normal appendix. Duodenal diverticulum is   seen extending from the third portion of the duodenum. Small hiatal   hernia.  PERITONEUM: No ascites.  VESSELS: Moderate aortoiliac atherosclerotic disease is seen without   aneurysm.  LYMPH NODES: No lymphadenopathy.  ABDOMINAL WALL: Small, bilateralfat-containing inguinal hernias.  BONES: There is a 5 mm ill-defined sclerotic focus in the right iliac   bone (image 126, series 2). No acute osseous fracture. Multilevel   degenerative changes are seen throughout the visualized spine.   Accentuation of thoracic kyphosis. Mild scoliotic curvature.  There is   diffuse osseous demineralization. Grade 1 anterolisthesis of L4 on L5.  OTHER:  None    IMPRESSION:  Large airspace consolidation with surrounding groundglass in the   apicoposterior segment of the LEFT upper lobe. Trace left pleural   effusion. This finding could represent pneumonia in the appropriate   clinical setting. Additional differential includes pulmonary infarction   (presence or absence of pulmonary embolism is not assessed on the current   noncontrast exam - CT angiogram can be obtained if indicated) versus   pulmonary contusion in the setting of blunt trauma. Recommend clinical   correlation and follow-up imaging to document resolution and exclude the   possibility of underlying malignancy.    Otherwise, no acute pathology identified in the chest, abdomen or pelvis.   Note that lack of IV contrast limits evaluation for acute traumatic   visceral and vascular injury.  No acute osseous fracture.    There is a 5 mm ill-defined sclerotic focus in the right iliac bone   (image 126, series 2). Although this finding is probably benign, given   history of breast cancer, metastatic disease is not reliably excluded.   Consider bone scan for further evaluation.    Hepatomegaly. Diffuse hepatic steatosis.    2.2 cm probable right ovarian cyst, amenable to sonographic correlation.    Spiculated appearing scarring in the right breast may correlate with   patient's known history of right breast malignancy.    Small hiatal hernia.    Other findings, as above  --- End of Report ---    EKG:  sinus rhythm, APCs, LAE, poor R wave V!-V3, RIVCD, no st/t wave changes-same as EKG in office    Telemetry:    ECHO:

## 2023-07-17 NOTE — CONSULT NOTE ADULT - SUBJECTIVE AND OBJECTIVE BOX
NEPHROLOGY INTERVAL HPI/OVERNIGHT EVENTS:  DUSTY [id number]  HPI:  68 year old female w AF s/p ablation, R breast CA 2017 on letrozole, depression, HTN, neuropathy R foot, and OA, BIBEMS s/p falls      Thursday 07-13 she fell outside in her yard as she was out w her 2 dogs; fell backwards; no head injury or LOC  on the ground for 2 hrs before she was able to get up; scooted to her door  Yesterday was on couch when she rolled on to floor; scooted on her knees and arms to toilet but could not get up   was on the floor until her sister came;  finally pt called the ambulance    when EMS arrived, tried helping pt ambulate but was unable to walk.    pt reports increasing fatigue over past few days  +occasional cough  Wednesday 07-12 her friend visited; no known sick contacts but friend has  grandchildren      In ED /50   HR 92   RR 17   T 100.4   94% RA     .  CT head no acute changes  CT Cspine DJD w narrowing  CT C/A/P  +KARELY PNA, +HM w steatosis, +sclerotic focus R iliac bone  R knee w DJD  cefepime  vanco  ----------------------------------------------------------------------------  NEPHROLOGY CONSULT   above hx corroborated with pt   denies  any diuretics use, no sick contacts  no f/c/ no n/v/d  po intake adequate  feels better with ivf  + trop with tele with no events so far, for echo           PAST MEDICAL HX/ Surgs hx   - afib  - breast ca on letrozole: chemo and xrt with lumpectomy  - bell palsy   - depression   - rt leg neuropathy   - OA of rt knee   - rt arm lymphedema   - arryhy with ablation 2016        FAMILY HX  CVA : Mother  COPD : Father  Coronary Art Dis : Father  Mantle cell Lymphoma : Sister  Pancreatic cancer : Grandfather  Prostate cancer : Cousin  Esophageal Cancer : Paternal Uncle        SOCIAL HX  never smoker  no alcohol  no drugs            FAMILY HISTORY:      MEDICATIONS  (STANDING):  azithromycin  IVPB      cefTRIAXone Injectable. 1000 milliGRAM(s) IV Push every 24 hours  enoxaparin Injectable 100 milliGRAM(s) SubCutaneous every 24 hours  letrozole 2.5 milliGRAM(s) Oral daily  metoprolol succinate ER 75 milliGRAM(s) Oral daily  sodium chloride 0.9%. 1000 milliLiter(s) (100 mL/Hr) IV Continuous <Continuous>  venlafaxine XR. 150 milliGRAM(s) Oral daily    MEDICATIONS  (PRN):  acetaminophen     Tablet .. 650 milliGRAM(s) Oral every 6 hours PRN Mild Pain (1 - 3)  aluminum hydroxide/magnesium hydroxide/simethicone Suspension 30 milliLiter(s) Oral every 4 hours PRN Dyspepsia  melatonin 3 milliGRAM(s) Oral at bedtime PRN Insomnia  ondansetron Injectable 4 milliGRAM(s) IV Push every 6 hours PRN Nausea and/or Vomiting      Allergies    red dye (Other)  Cipro (Other)    Intolerances    spinach (Vomiting)      I&O's Summary      Home Medications:  letrozole 2.5 mg oral tablet: 1 tab(s) orally once a day (16 Jul 2023 15:47)  metoprolol succinate 25 mg oral tablet, extended release: 1 tab(s) orally once a day ***take with 50 mg total 75 mg*** (16 Jul 2023 15:46)  metoprolol succinate 50 mg oral tablet, extended release: 1 tab(s) orally once a day ***take with 25mg total 75 mg (16 Jul 2023 15:46)  ondansetron 8 mg oral tablet: 1 tab(s) orally once a day as needed for (16 Jul 2023 15:47)  venlafaxine 150 mg oral capsule, extended release: 1 cap(s) orally once a day (16 Jul 2023 15:46)          Vital Signs Last 24 Hrs  T(C): 36.8 (17 Jul 2023 09:41), Max: 40.1 (16 Jul 2023 12:30)  T(F): 98.3 (17 Jul 2023 09:41), Max: 104.2 (16 Jul 2023 12:30)  HR: 85 (17 Jul 2023 08:08) (76 - 99)  BP: 105/56 (17 Jul 2023 08:08) (102/52 - 133/66)  BP(mean): 65 (16 Jul 2023 15:03) (65 - 65)  RR: 18 (17 Jul 2023 08:08) (17 - 20)  SpO2: 94% (17 Jul 2023 08:08) (94% - 99%)    Parameters below as of 17 Jul 2023 08:08  Patient On (Oxygen Delivery Method): room air      Daily Height in cm: 180.34 (16 Jul 2023 12:16)    Daily   I&O's Summary      PHYSICAL EXAM:  GEN: alert awake NAD  HEENT: MMM  NECK supple no jvd  CV: RRR s1s2  LUNGS: b/l CTA  ABD: +bs soft, nt/nd  EXT: no edema    LABS:                        12.4   7.62  )-----------( 227      ( 17 Jul 2023 05:49 )             35.3     07-17    134<L>  |  105  |  17  ----------------------------<  125<H>  3.4<L>   |  22  |  1.03    Ca    8.0<L>      17 Jul 2023 05:49  Phos  1.9     07-16  Mg     2.2     07-16    TPro  6.1  /  Alb  2.3<L>  /  TBili  0.5  /  DBili  x   /  AST  770<H>  /  ALT  290<H>  /  AlkPhos  64  07-17    PT/INR - ( 16 Jul 2023 12:53 )   PT: 13.9 sec;   INR: 1.20 ratio         PTT - ( 16 Jul 2023 12:53 )  PTT:24.7 sec  Urinalysis Basic - ( 17 Jul 2023 05:49 )    Color: x / Appearance: x / SG: x / pH: x  Gluc: 125 mg/dL / Ketone: x  / Bili: x / Urobili: x   Blood: x / Protein: x / Nitrite: x   Leuk Esterase: x / RBC: x / WBC x   Sq Epi: x / Non Sq Epi: x / Bacteria: x      Vitamin D, 25-Hydroxy: 30.2 ng/mL (07-17 @ 05:49)  Magnesium: 2.2 mg/dL (07-16 @ 12:53)  Phosphorus: 1.9 mg/dL (07-16 @ 12:53)

## 2023-07-17 NOTE — CONSULT NOTE ADULT - ASSESSMENT
67 yo with hx of arrhythmias and breast ca on letrozole presents with weakness leading to falls leading to rhabdo.  Hyponatremia depletional   + trop    REC  - cw ivf and fu trend of cpk  - fu trend of Na with ivf   - + trop in setting of rhabdo, echo and tele monitoring

## 2023-07-17 NOTE — PATIENT PROFILE ADULT - FALL HARM RISK - HARM RISK INTERVENTIONS
Assistance with ambulation/Assistance OOB with selected safe patient handling equipment/Communicate Risk of Fall with Harm to all staff/Discuss with provider need for PT consult/Monitor for mental status changes/Monitor gait and stability/Provide patient with walking aids - walker, cane, crutches/Reinforce activity limits and safety measures with patient and family/Tailored Fall Risk Interventions/Use of alarms - bed, chair and/or voice tab/Visual Cue: Yellow wristband and red socks/Bed in lowest position, wheels locked, appropriate side rails in place/Call bell, personal items and telephone in reach/Instruct patient to call for assistance before getting out of bed or chair/Non-slip footwear when patient is out of bed/[place] to call system/Physically safe environment - no spills, clutter or unnecessary equipment/Purposeful Proactive Rounding/Room/bathroom lighting operational, light cord in reach

## 2023-07-17 NOTE — PHYSICAL THERAPY INITIAL EVALUATION ADULT - MODALITIES TREATMENT COMMENTS
pt left seated in chair post Eval; chair alarm donned; HM in place; callbell in reach; pt instructed not to get up alone; call nursing for assist; jose well; denied pain; ROSE MARY Sylvester made aware pt OOB (ROSE MARY Arevalo @ lunch)

## 2023-07-17 NOTE — PATIENT PROFILE ADULT - FUNCTIONAL ASSESSMENT - BASIC MOBILITY 6.
2-calculated by average/Not able to assess (calculate score using [facility] averaging method)

## 2023-07-18 LAB
ADD ON TEST-SPECIMEN IN LAB: SIGNIFICANT CHANGE UP
ALBUMIN SERPL ELPH-MCNC: 2.2 G/DL — LOW (ref 3.3–5)
ALP SERPL-CCNC: 68 U/L — SIGNIFICANT CHANGE UP (ref 40–120)
ALT FLD-CCNC: 271 U/L — HIGH (ref 12–78)
ANION GAP SERPL CALC-SCNC: 4 MMOL/L — LOW (ref 5–17)
AST SERPL-CCNC: 559 U/L — HIGH (ref 15–37)
BILIRUB SERPL-MCNC: 0.4 MG/DL — SIGNIFICANT CHANGE UP (ref 0.2–1.2)
BUN SERPL-MCNC: 20 MG/DL — SIGNIFICANT CHANGE UP (ref 7–23)
CALCIUM SERPL-MCNC: 8.1 MG/DL — LOW (ref 8.5–10.1)
CHLORIDE SERPL-SCNC: 107 MMOL/L — SIGNIFICANT CHANGE UP (ref 96–108)
CK SERPL-CCNC: CRITICAL HIGH U/L (ref 26–192)
CO2 SERPL-SCNC: 24 MMOL/L — SIGNIFICANT CHANGE UP (ref 22–31)
CREAT SERPL-MCNC: 1.01 MG/DL — SIGNIFICANT CHANGE UP (ref 0.5–1.3)
EGFR: 60 ML/MIN/1.73M2 — SIGNIFICANT CHANGE UP
GLUCOSE SERPL-MCNC: 102 MG/DL — HIGH (ref 70–99)
HCT VFR BLD CALC: 35.2 % — SIGNIFICANT CHANGE UP (ref 34.5–45)
HGB BLD-MCNC: 12.2 G/DL — SIGNIFICANT CHANGE UP (ref 11.5–15.5)
MCHC RBC-ENTMCNC: 30.5 PG — SIGNIFICANT CHANGE UP (ref 27–34)
MCHC RBC-ENTMCNC: 34.7 GM/DL — SIGNIFICANT CHANGE UP (ref 32–36)
MCV RBC AUTO: 88 FL — SIGNIFICANT CHANGE UP (ref 80–100)
NT-PROBNP SERPL-SCNC: 5290 PG/ML — HIGH (ref 0–125)
PLATELET # BLD AUTO: 202 K/UL — SIGNIFICANT CHANGE UP (ref 150–400)
POTASSIUM SERPL-MCNC: 3.9 MMOL/L — SIGNIFICANT CHANGE UP (ref 3.5–5.3)
POTASSIUM SERPL-SCNC: 3.9 MMOL/L — SIGNIFICANT CHANGE UP (ref 3.5–5.3)
PROT SERPL-MCNC: 6 GM/DL — SIGNIFICANT CHANGE UP (ref 6–8.3)
RBC # BLD: 4 M/UL — SIGNIFICANT CHANGE UP (ref 3.8–5.2)
RBC # FLD: 13.5 % — SIGNIFICANT CHANGE UP (ref 10.3–14.5)
SODIUM SERPL-SCNC: 135 MMOL/L — SIGNIFICANT CHANGE UP (ref 135–145)
WBC # BLD: 4.7 K/UL — SIGNIFICANT CHANGE UP (ref 3.8–10.5)
WBC # FLD AUTO: 4.7 K/UL — SIGNIFICANT CHANGE UP (ref 3.8–10.5)

## 2023-07-18 PROCEDURE — 99232 SBSQ HOSP IP/OBS MODERATE 35: CPT

## 2023-07-18 PROCEDURE — 99233 SBSQ HOSP IP/OBS HIGH 50: CPT

## 2023-07-18 RX ORDER — CLOPIDOGREL BISULFATE 75 MG/1
75 TABLET, FILM COATED ORAL DAILY
Refills: 0 | Status: DISCONTINUED | OUTPATIENT
Start: 2023-07-18 | End: 2023-07-20

## 2023-07-18 RX ORDER — SPIRONOLACTONE 25 MG/1
12.5 TABLET, FILM COATED ORAL DAILY
Refills: 0 | Status: DISCONTINUED | OUTPATIENT
Start: 2023-07-18 | End: 2023-07-21

## 2023-07-18 RX ORDER — ASPIRIN/CALCIUM CARB/MAGNESIUM 324 MG
81 TABLET ORAL DAILY
Refills: 0 | Status: DISCONTINUED | OUTPATIENT
Start: 2023-07-18 | End: 2023-07-26

## 2023-07-18 RX ADMIN — CLOPIDOGREL BISULFATE 75 MILLIGRAM(S): 75 TABLET, FILM COATED ORAL at 11:00

## 2023-07-18 RX ADMIN — Medication 75 MILLIGRAM(S): at 11:01

## 2023-07-18 RX ADMIN — CEFTRIAXONE 1000 MILLIGRAM(S): 500 INJECTION, POWDER, FOR SOLUTION INTRAMUSCULAR; INTRAVENOUS at 11:01

## 2023-07-18 RX ADMIN — Medication 150 MILLIGRAM(S): at 11:00

## 2023-07-18 RX ADMIN — LETROZOLE 2.5 MILLIGRAM(S): 2.5 TABLET, FILM COATED ORAL at 11:00

## 2023-07-18 RX ADMIN — SPIRONOLACTONE 12.5 MILLIGRAM(S): 25 TABLET, FILM COATED ORAL at 11:01

## 2023-07-18 RX ADMIN — SODIUM CHLORIDE 75 MILLILITER(S): 9 INJECTION INTRAMUSCULAR; INTRAVENOUS; SUBCUTANEOUS at 12:59

## 2023-07-18 RX ADMIN — Medication 81 MILLIGRAM(S): at 11:01

## 2023-07-18 RX ADMIN — SODIUM CHLORIDE 100 MILLILITER(S): 9 INJECTION INTRAMUSCULAR; INTRAVENOUS; SUBCUTANEOUS at 01:05

## 2023-07-18 RX ADMIN — AZITHROMYCIN 255 MILLIGRAM(S): 500 TABLET, FILM COATED ORAL at 10:59

## 2023-07-18 RX ADMIN — Medication 400 MILLIGRAM(S): at 00:30

## 2023-07-18 RX ADMIN — Medication 3 MILLIGRAM(S): at 22:10

## 2023-07-18 NOTE — PROGRESS NOTE ADULT - SUBJECTIVE AND OBJECTIVE BOX
NEPHROLOGY INTERVAL HPI/OVERNIGHT EVENTS:  07-18-23 @ 08:59    HPI:  68 year old female w AF s/p ablation, R breast CA 2017 on letrozole, depression, HTN, neuropathy R foot, and OA, BIBEMS s/p falls      Thursday 07-13 she fell outside in her yard as she was out w her 2 dogs; fell backwards; no head injury or LOC  on the ground for 2 hrs before she was able to get up; scooted to her door  Yesterday was on couch when she rolled on to floor; scooted on her knees and arms to toilet but could not get up   was on the floor until her sister came;  finally pt called the ambulance    when EMS arrived, tried helping pt ambulate but was unable to walk.    pt reports increasing fatigue over past few days  +occasional cough  Wednesday 07-12 her friend visited; no known sick contacts but friend has  grandchildren      In ED /50   HR 92   RR 17   T 100.4   94% RA     .  CT head no acute changes  CT Cspine DJD w narrowing  CT C/A/P  +KARELY PNA, +HM w steatosis, +sclerotic focus R iliac bone  R knee w DJD  cefepime  vanco  ----------------------------------------------------------------------------  NEPHROLOGY CONSULT   above hx corroborated with pt   denies  any diuretics use, no sick contacts  no f/c/ no n/v/d  po intake adequate  feels better with ivf  + trop with tele with no events so far, for echo           PAST MEDICAL HX/ Surgs hx   - afib  - breast ca on letrozole: chemo and xrt with lumpectomy  - bell palsy   - depression   - rt leg neuropathy   - OA of rt knee   - rt arm lymphedema   - arryhy with ablation 2016      MEDICATIONS  (STANDING):  aspirin  chewable 81 milliGRAM(s) Oral daily  azithromycin  IVPB 500 milliGRAM(s) IV Intermittent every 24 hours  azithromycin  IVPB      cefTRIAXone Injectable. 1000 milliGRAM(s) IV Push every 24 hours  clopidogrel Tablet 75 milliGRAM(s) Oral daily  letrozole 2.5 milliGRAM(s) Oral daily  metoprolol succinate ER 75 milliGRAM(s) Oral daily  sodium chloride 0.9%. 1000 milliLiter(s) (100 mL/Hr) IV Continuous <Continuous>  spironolactone 12.5 milliGRAM(s) Oral daily  venlafaxine XR. 150 milliGRAM(s) Oral daily    MEDICATIONS  (PRN):  acetaminophen     Tablet .. 650 milliGRAM(s) Oral every 6 hours PRN Mild Pain (1 - 3)  aluminum hydroxide/magnesium hydroxide/simethicone Suspension 30 milliLiter(s) Oral every 4 hours PRN Dyspepsia  melatonin 3 milliGRAM(s) Oral at bedtime PRN Insomnia  ondansetron Injectable 4 milliGRAM(s) IV Push every 6 hours PRN Nausea and/or Vomiting      Allergies    red dye (Other)  Cipro (Other)    Intolerances    spinach (Vomiting)      I&O's Detail      .    Patient was seen and evaluated on dialysis.   Patient is tolerating the procedure well.   Continue dialysis:   Dialyzer:          QB:        QD:   Goal UF ___ over ___ Hours       Vital Signs Last 24 Hrs  T(C): 36.8 (18 Jul 2023 08:23), Max: 38.1 (17 Jul 2023 21:05)  T(F): 98.2 (18 Jul 2023 08:23), Max: 100.5 (17 Jul 2023 21:05)  HR: 63 (18 Jul 2023 08:23) (63 - 92)  BP: 119/56 (18 Jul 2023 08:23) (102/40 - 119/56)  BP(mean): --  RR: 18 (18 Jul 2023 08:23) (18 - 18)  SpO2: 98% (18 Jul 2023 08:23) (96% - 100%)    Parameters below as of 18 Jul 2023 08:23  Patient On (Oxygen Delivery Method): room air      Daily     Daily     PHYSICAL EXAM:  General: alert. awake Ox3  HEENT: MMM  CV: s1s2 rrr  LUNGS: B/L CTA  EXT: no edema    LABS:                        12.2   4.70  )-----------( 202      ( 18 Jul 2023 07:21 )             35.2     07-18    135  |  107  |  20  ----------------------------<  102<H>  3.9   |  24  |  1.01    Ca    8.1<L>      18 Jul 2023 07:21  Phos  1.9     07-16  Mg     2.2     07-16    TPro  6.0  /  Alb  2.2<L>  /  TBili  0.4  /  DBili  x   /  AST  559<H>  /  ALT  271<H>  /  AlkPhos  68  07-18    PT/INR - ( 16 Jul 2023 12:53 )   PT: 13.9 sec;   INR: 1.20 ratio         PTT - ( 16 Jul 2023 12:53 )  PTT:24.7 sec  Urinalysis Basic - ( 18 Jul 2023 07:21 )    Color: x / Appearance: x / SG: x / pH: x  Gluc: 102 mg/dL / Ketone: x  / Bili: x / Urobili: x   Blood: x / Protein: x / Nitrite: x   Leuk Esterase: x / RBC: x / WBC x   Sq Epi: x / Non Sq Epi: x / Bacteria: x           NEPHROLOGY INTERVAL HPI/OVERNIGHT EVENTS:  07-18-23 @ 08:59    7/18 feels better able to tolerate po, too weak to get out of bed, tele withruns of svt, metoprolol inc   HPI:  68 year old female w AF s/p ablation, R breast CA 2017 on letrozole, depression, HTN, neuropathy R foot, and OA, BIBEMS s/p falls      Thursday 07-13 she fell outside in her yard as she was out w her 2 dogs; fell backwards; no head injury or LOC  on the ground for 2 hrs before she was able to get up; scooted to her door  Yesterday was on couch when she rolled on to floor; scooted on her knees and arms to toilet but could not get up   was on the floor until her sister came;  finally pt called the ambulance    when EMS arrived, tried helping pt ambulate but was unable to walk.    pt reports increasing fatigue over past few days  +occasional cough  Wednesday 07-12 her friend visited; no known sick contacts but friend has  grandchildren      In ED /50   HR 92   RR 17   T 100.4   94% RA     .  CT head no acute changes  CT Cspine DJD w narrowing  CT C/A/P  +KARELY PNA, +HM w steatosis, +sclerotic focus R iliac bone  R knee w DJD  cefepime  vanco  ----------------------------------------------------------------------------  NEPHROLOGY CONSULT   above hx corroborated with pt   denies  any diuretics use, no sick contacts  no f/c/ no n/v/d  po intake adequate  feels better with ivf  + trop with tele with no events so far, for echo           PAST MEDICAL HX/ Surgs hx   - afib  - breast ca on letrozole: chemo and xrt with lumpectomy  - bell palsy   - depression   - rt leg neuropathy   - OA of rt knee   - rt arm lymphedema   - arryhy with ablation 2016      MEDICATIONS  (STANDING):  aspirin  chewable 81 milliGRAM(s) Oral daily  azithromycin  IVPB 500 milliGRAM(s) IV Intermittent every 24 hours  azithromycin  IVPB      cefTRIAXone Injectable. 1000 milliGRAM(s) IV Push every 24 hours  clopidogrel Tablet 75 milliGRAM(s) Oral daily  letrozole 2.5 milliGRAM(s) Oral daily  metoprolol succinate ER 75 milliGRAM(s) Oral daily  sodium chloride 0.9%. 1000 milliLiter(s) (100 mL/Hr) IV Continuous <Continuous>  spironolactone 12.5 milliGRAM(s) Oral daily  venlafaxine XR. 150 milliGRAM(s) Oral daily    MEDICATIONS  (PRN):  acetaminophen     Tablet .. 650 milliGRAM(s) Oral every 6 hours PRN Mild Pain (1 - 3)  aluminum hydroxide/magnesium hydroxide/simethicone Suspension 30 milliLiter(s) Oral every 4 hours PRN Dyspepsia  melatonin 3 milliGRAM(s) Oral at bedtime PRN Insomnia  ondansetron Injectable 4 milliGRAM(s) IV Push every 6 hours PRN Nausea and/or Vomiting      Allergies    red dye (Other)  Cipro (Other)    Intolerances    spinach (Vomiting)      I&O's Detail      .    Patient was seen and evaluated on dialysis.   Patient is tolerating the procedure well.   Continue dialysis:   Dialyzer:          QB:        QD:   Goal UF ___ over ___ Hours       Vital Signs Last 24 Hrs  T(C): 36.8 (18 Jul 2023 08:23), Max: 38.1 (17 Jul 2023 21:05)  T(F): 98.2 (18 Jul 2023 08:23), Max: 100.5 (17 Jul 2023 21:05)  HR: 63 (18 Jul 2023 08:23) (63 - 92)  BP: 119/56 (18 Jul 2023 08:23) (102/40 - 119/56)  BP(mean): --  RR: 18 (18 Jul 2023 08:23) (18 - 18)  SpO2: 98% (18 Jul 2023 08:23) (96% - 100%)    Parameters below as of 18 Jul 2023 08:23  Patient On (Oxygen Delivery Method): room air      Daily     Daily     PHYSICAL EXAM:  General: alert. awake Ox3  HEENT: MMM  CV: s1s2 rrr  LUNGS: B/L CTA  EXT: no edema    LABS:                        12.2   4.70  )-----------( 202      ( 18 Jul 2023 07:21 )             35.2     07-18    135  |  107  |  20  ----------------------------<  102<H>  3.9   |  24  |  1.01    Ca    8.1<L>      18 Jul 2023 07:21  Phos  1.9     07-16  Mg     2.2     07-16    TPro  6.0  /  Alb  2.2<L>  /  TBili  0.4  /  DBili  x   /  AST  559<H>  /  ALT  271<H>  /  AlkPhos  68  07-18    PT/INR - ( 16 Jul 2023 12:53 )   PT: 13.9 sec;   INR: 1.20 ratio         PTT - ( 16 Jul 2023 12:53 )  PTT:24.7 sec  Urinalysis Basic - ( 18 Jul 2023 07:21 )    Color: x / Appearance: x / SG: x / pH: x  Gluc: 102 mg/dL / Ketone: x  / Bili: x / Urobili: x   Blood: x / Protein: x / Nitrite: x   Leuk Esterase: x / RBC: x / WBC x   Sq Epi: x / Non Sq Epi: x / Bacteria: x

## 2023-07-18 NOTE — PROGRESS NOTE ADULT - ASSESSMENT
patient with no symptoms of chest pains or SOB and stable EKG; tele,etry with runs of SVT and ECHO with   1-arrhythmia-increase metoprolol to 100mg daily  2-CAD elevated enzymes with no EKG changes and no chest pains;   3-CHF  patient with no symptoms of chest pains or SOB and stable EKG; tele,etry with runs of SVT and ECHO with   1-arrhythmia-increase metoprolol to 100mg daily  2-CAD elevated enzymes with no EKG changes and no chest pains; ECHO with wall motion abnormalities-stopped lovenox; give ASA and plavix and will set up cardiac cath for tomorrow  3-CHF-EF 40%; get BNP level and will treat with CHF medications-BP low and will be difficult to treat with entresto; start low dose spironolacotne

## 2023-07-18 NOTE — CONSULT NOTE ADULT - ASSESSMENT
68 year old F hx of HER 2 stage IIA breast cancer, s/p lumpectomy, RT, AC x 4, taxol/herceptin and herceptin  x 12 months presents after fall    1) Fall  -orthostatics neg  -CPK downtrending  -continue IVF  -renal and physical therapy following    2) Elevated troponin  Likely demand ischemia, follow up cardiology recs    3) Pna  on abx    4) Abnormal imaging  There is a 5 mm ill-defined sclerotic focus in the right iliac bone   (image 126, series 2). Although this finding is probably benign, given   history of breast cancer, metastatic disease is not reliably excluded.   Consider bone scan for further evaluation.    -Agree with bone scan to better evaluate  -After dc PET scan may be appropriate as well  -Will continue to follow      Kendall Estevez MD  [facility]  Cell: [phone]

## 2023-07-18 NOTE — PROGRESS NOTE ADULT - ASSESSMENT
68 year old female w AF s/p ablation, R breast CA 2017 on letrozole, depression, HTN, neuropathy R foot, and OA, BIBEMS s/p falls.    #Elevated troponin, likely 2/2 rhabdomyolysis vs demand ischemia vs NSTEMI  -downtrending  -echo w mild diffuse hypokinesis  -s/p 24hr full a/c w lovenox, will d/c  -ASA, plavix, metop  -likely cath tmrw  -f/u cards recs    #rhabdomyolysis 2/2 fall w time down  -orthostatics neg  -CPK downtrending  -Cr ok, trend  -continue IVF  -appreciate renal recs  -PT eval    #transaminitis  -likely 2/2 rhabdo vs dehydration in setting of underlying hepatic steatosis  -trend, improving    #Severe sepsis w lactic acidosis 2/2 CAP  -on RA  -lactate downtrending  -BCx NG  -Chest imaging as above, will need repeat in 6wks  -Azithro/CTX    HFrEF  -metop  -aldactone    #Hx breast CA  -onc consulted given findings on CT, ?bone met  -bone scan to f/u    #R ovarian cyst  -outpt u/s to f/u    #DVT ppx- SQL     Medically active

## 2023-07-18 NOTE — PROGRESS NOTE ADULT - ASSESSMENT
67 yo with hx of arrhythmias and breast ca on letrozole presents with weakness leading to falls leading to rhabdo.  Hyponatremia depletional   + trop    REC  - cw ivf and fu trend of cpk  - fu trend of Na with ivf   - + trop in setting of rhabdo, echo and tele monitoring    67 yo with hx of arrhythmias and breast ca on letrozole presents with weakness leading to falls leading to rhabdo.  Hyponatremia depletional   + trop    REC  - cw ivf and fu trend of cpk  - fu trend of Na with ivf   - + trop in setting of rhabdo, echo and tele monitoring     7/18 MK   - Rhabdo cpk improving     cw ivf, will dec rate   - resolved hyponatremia   - weakness with runs of svt: metoprolol inc    echo with ef 40%    started on spironolactone and for cath in am

## 2023-07-18 NOTE — CONSULT NOTE ADULT - SUBJECTIVE AND OBJECTIVE BOX
Reason for consult: fall    HPI:  68 year old female w AF s/p ablation, R breast CA 2017 on letrozole, depression, HTN, neuropathy R foot, and OA, BIBEMS s/p falls  She reports falling in her house late last week   She states she was so weak at home she could not get herself up for many hours  She did report increasing fatigue over the past few months  She is currently being treated for PNA  She states she is feeling more energetic  She has  hx of breast ca treated in 2017 with no evidence of recurrence, following with Dr Ponce        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  OA osteoarthritis of R knee   Venous stasis ulcer of right ankle limited to breakdown of skin with varicose veins      PAST SURGICAL HX	  Catheter Ablation 2016  Breast Surgery Lumpectomy 07/2003 and 05/2017  Hamel lymph node biopsy  Breast biopsy  Complete colonoscopy 2017      FAMILY HX  CVA : Mother  COPD : Father  Coronary Art Dis : Father  Mantle cell Lymphoma : Sister  Pancreatic cancer : Grandfather  Prostate cancer : Cousin  Esophageal Cancer : Paternal Uncle        SOCIAL HX  never smoker  no alcohol  no drugs    lives alone w her 2 dogs : [place] and a [place] (16 Jul 2023 22:06)      PAST MEDICAL & SURGICAL HISTORY:  Breast lump  benign      Malignant neoplasm of right female breast, unspecified site of breast      Breast pain, right      Breast skin changes  right breast      Essential hypertension      SVT (supraventricular tachycardia)      Microscopic hematuria      Osteoarthritis  knees      Rosacea      H/O cardiac radiofrequency ablation      History of lumpectomy of right breast  benign -2003      H/O right breast biopsy  2017      S/P colonoscopy          FAMILY HISTORY:      Alochol: Denied  Smoking: Nonsmoker  Drug Use: Denied  Marital Status:         Allergies    red dye (Other)  Cipro (Other)    Intolerances    spinach (Vomiting)      MEDICATIONS  (STANDING):  aspirin  chewable 81 milliGRAM(s) Oral daily  azithromycin  IVPB 500 milliGRAM(s) IV Intermittent every 24 hours  azithromycin  IVPB      cefTRIAXone Injectable. 1000 milliGRAM(s) IV Push every 24 hours  clopidogrel Tablet 75 milliGRAM(s) Oral daily  letrozole 2.5 milliGRAM(s) Oral daily  metoprolol succinate ER 75 milliGRAM(s) Oral daily  sodium chloride 0.9%. 1000 milliLiter(s) (75 mL/Hr) IV Continuous <Continuous>  spironolactone 12.5 milliGRAM(s) Oral daily  venlafaxine XR. 150 milliGRAM(s) Oral daily    MEDICATIONS  (PRN):  acetaminophen     Tablet .. 650 milliGRAM(s) Oral every 6 hours PRN Mild Pain (1 - 3)  aluminum hydroxide/magnesium hydroxide/simethicone Suspension 30 milliLiter(s) Oral every 4 hours PRN Dyspepsia  melatonin 3 milliGRAM(s) Oral at bedtime PRN Insomnia  ondansetron Injectable 4 milliGRAM(s) IV Push every 6 hours PRN Nausea and/or Vomiting      ROS  +fatigue      T(C): 36.8 (07-18-23 @ 08:23), Max: 38.1 (07-17-23 @ 21:05)  HR: 63 (07-18-23 @ 08:23) (63 - 92)  BP: 119/56 (07-18-23 @ 08:23) (102/40 - 119/56)  RR: 18 (07-18-23 @ 08:23) (18 - 18)  SpO2: 98% (07-18-23 @ 08:23) (96% - 100%)  Wt(kg): --    PE  NAD  Awake, alert  Anicteric, MMM  RRR  CTAB  Abd soft, NT, ND  No c/c/e  No rash grossly  FROM                          12.2   4.70  )-----------( 202 ( 18 Jul 2023 07:21 )             35.2       07-18    135  |  107  |  20  ----------------------------<  102<H>  3.9   |  24  |  1.01    Ca    8.1<L>      18 Jul 2023 07:21  Phos  1.9     07-16  Mg     2.2     07-16    TPro  6.0  /  Alb  2.2<L>  /  TBili  0.4  /  DBili  x   /  AST  559<H>  /  ALT  271<H>  /  AlkPhos  68  07-18

## 2023-07-18 NOTE — PROGRESS NOTE ADULT - SUBJECTIVE AND OBJECTIVE BOX
CHIEF COMPLAINT: Patient is a 68y old  Female who presents with a chief complaint of elevated troponin  acute rhabdomyolysis   falls (17 Jul 2023 16:29)      HPI:  68 year old female w AF s/p ablation, R breast CA 2017 on letrozole, depression, HTN, neuropathy R foot, and OA, BIBEMS s/p falls      Thursday 07-13 she fell outside in her yard as she was out w her 2 dogs; fell backwards; no head injury or LOC  on the ground for 2 hrs before she was able to get up; scooted to her door  Yesterday was on couch when she rolled on to floor; scooted on her knees and arms to toilet but could not get up   was on the floor until her sister came;  finally pt called the ambulance    when EMS arrived, tried helping pt ambulate but was unable to walk.    pt reports increasing fatigue over past few days  +occasional cough  Wednesday 07-12 her friend visited; no known sick contacts but friend has  grandchildren    7/17-patient well known to me; has above medical history; did have multiple ECHO with normal LV function but no ischemic w/u in the past.   Very elevated troponin levels with in face of rhabdomyolysis with no chest pains ano EKG changes.       7/18-patient with ECHO done l denying chest pains, but with c/o palpitations.   Telemetry monitor showing runs of SVT, (no afib)    In ED /50   HR 92   RR 17   T 100.4   94% RA     .  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 and gentry Willett (16 Jul 2023 22:06)      PMHx: PAST MEDICAL & SURGICAL HISTORY:  Breast lump  benign      Malignant neoplasm of right female breast, unspecified site of breast      Breast pain, right      Breast skin changes  right breast      Essential hypertension      SVT (supraventricular tachycardia)      Microscopic hematuria      Osteoarthritis  knees      Rosacea      H/O cardiac radiofrequency ablation      History of lumpectomy of right breast  benign -2003      H/O right breast biopsy  2017      S/P colonoscopy          Allergies: Allergies    red dye (Other)  Cipro (Other)    Intolerances    spinach (Vomiting)        REVIEW OF SYSTEMS:    CONSTITUTIONAL: No weakness, fevers or chills  EYES/ENT: No visual changes;  No vertigo or throat pain   NECK: No pain or stiffness  RESPIRATORY: No cough, wheezing, hemoptysis; No shortness of breath  CARDIOVASCULAR: No chest pain or palpitations  GASTROINTESTINAL: No abdominal or epigastric pain. No nausea, vomiting, or hematemesis; No diarrhea or constipation. No melena or hematochezia.  GENITOURINARY: No dysuria, frequency or hematuria      Vital Signs Last 24 Hrs  T(C): 36.4 (18 Jul 2023 00:00), Max: 38.1 (17 Jul 2023 21:05)  T(F): 97.5 (18 Jul 2023 00:00), Max: 100.5 (17 Jul 2023 21:05)  HR: 92 (17 Jul 2023 21:05) (85 - 92)  BP: 108/55 (17 Jul 2023 21:05) (102/40 - 108/55)  BP(mean): --  RR: 18 (17 Jul 2023 21:05) (18 - 18)  SpO2: 96% (17 Jul 2023 21:05) (94% - 100%)    Parameters below as of 17 Jul 2023 21:05  Patient On (Oxygen Delivery Method): room air        I&O's Summary          PHYSICAL EXAM:   Constitutional: NAD, awake and alert, well-developed  HEENT: PERR, EOMI, Normal Hearing, MMM  Neck:  JVD  Respiratory: Breath sounds are clear bilaterally, No wheezing, rales or rhonchi  Cardiovascular: S1 and S2, regular rate and rhythm, Murmurs, gallops or rubs  Gastrointestinal: Bowel Sounds present, soft, nontender, nondistended, no guarding, no rebound  Extremities: No peripheral edema  Vascular: 2+ peripheral pulses      MEDICATIONS:  MEDICATIONS  (STANDING):  azithromycin  IVPB      azithromycin  IVPB 500 milliGRAM(s) IV Intermittent every 24 hours  cefTRIAXone Injectable. 1000 milliGRAM(s) IV Push every 24 hours  enoxaparin Injectable 40 milliGRAM(s) SubCutaneous every 24 hours  letrozole 2.5 milliGRAM(s) Oral daily  metoprolol succinate ER 75 milliGRAM(s) Oral daily  sodium chloride 0.9%. 1000 milliLiter(s) (100 mL/Hr) IV Continuous <Continuous>  venlafaxine XR. 150 milliGRAM(s) Oral daily      LABS: All Labs Reviewed:                        12.4   7.62  )-----------( 227      ( 17 Jul 2023 05:49 )             35.3     07-17    134<L>  |  105  |  17  ----------------------------<  125<H>  3.4<L>   |  22  |  1.03    Ca    8.0<L>      17 Jul 2023 05:49  Phos  1.9     07-16  Mg     2.2     07-16    TPro  6.1  /  Alb  2.3<L>  /  TBili  0.5  /  DBili  x   /  AST  770<H>  /  ALT  290<H>  /  AlkPhos  64  07-17    PT/INR - ( 16 Jul 2023 12:53 )   PT: 13.9 sec;   INR: 1.20 ratio         PTT - ( 16 Jul 2023 12:53 )  PTT:24.7 sec  CARDIAC MARKERS ( 17 Jul 2023 13:49 )  x     / x     / 21024 U/L / x     / x      CARDIAC MARKERS ( 17 Jul 2023 05:49 )  x     / x     / 43929 U/L / x     / x      CARDIAC MARKERS ( 16 Jul 2023 23:12 )  x     / x     / 33508 U/L / x     / x      CARDIAC MARKERS ( 16 Jul 2023 12:53 )  x     / x     / 08391 U/L / x     / x          Blood Culture: Organism --  Gram Stain Blood -- Gram Stain --  Specimen Source Clean Catch None  Culture-Blood --        lipid profile          07-17 @ 05:49  cholesterol 112 mg/dL  direct LDL --  HDL 29 mg/dL  HDL/total cholesterol --  Triglyceride, serum 129 mg/dL    BNP     RADIOLOGY:    EKG:      Telemetry:    ECHO:       CHIEF COMPLAINT: Patient is a 68y old  Female who presents with a chief complaint of elevated troponin  acute rhabdomyolysis   falls (17 Jul 2023 16:29)      HPI:  68 year old female w AF s/p ablation, R breast CA 2017 on letrozole, depression, HTN, neuropathy R foot, and OA, BIBEMS s/p falls      Thursday 07-13 she fell outside in her yard as she was out w her 2 dogs; fell backwards; no head injury or LOC  on the ground for 2 hrs before she was able to get up; scooted to her door  Yesterday was on couch when she rolled on to floor; scooted on her knees and arms to toilet but could not get up   was on the floor until her sister came;  finally pt called the ambulance    when EMS arrived, tried helping pt ambulate but was unable to walk.    pt reports increasing fatigue over past few days  +occasional cough  Wednesday 07-12 her friend visited; no known sick contacts but friend has  grandchildren    7/17-patient well known to me; has above medical history; did have multiple ECHO with normal LV function but no ischemic w/u in the past.   Very elevated troponin levels with in face of rhabdomyolysis with no chest pains ano EKG changes.       7/18-patient with ECHO done l denying chest pains, but with c/o palpitations.   Patient febrile to 100.5-receiving ceftriaxone for suspected pneumonia.  Telemetry monitor showing runs of SVT, (no afib).  ECHO with EF 40% and inferior wall hypokinesis    In ED /50   HR 92   RR 17   T 100.4   94% RA     .  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 and gentry Willett (16 Jul 2023 22:06)      PMHx: PAST MEDICAL & SURGICAL HISTORY:  Breast lump  benign      Malignant neoplasm of right female breast, unspecified site of breast      Breast pain, right      Breast skin changes  right breast      Essential hypertension      SVT (supraventricular tachycardia)      Microscopic hematuria      Osteoarthritis  knees      Rosacea      H/O cardiac radiofrequency ablation      History of lumpectomy of right breast  benign -2003      H/O right breast biopsy  2017      S/P colonoscopy          Allergies: Allergies    red dye (Other)  Cipro (Other)    Intolerances    spinach (Vomiting)        REVIEW OF SYSTEMS:    CONSTITUTIONAL: No weakness, fevers or chills  EYES/ENT: No visual changes;  No vertigo or throat pain   NECK: No pain or stiffness  RESPIRATORY: No cough, wheezing, hemoptysis; No shortness of breath  CARDIOVASCULAR: No chest pain or palpitations  GASTROINTESTINAL: No abdominal or epigastric pain. No nausea, vomiting, or hematemesis; No diarrhea or constipation. No melena or hematochezia.  GENITOURINARY: No dysuria, frequency or hematuria      Vital Signs Last 24 Hrs  T(C): 36.4 (18 Jul 2023 00:00), Max: 38.1 (17 Jul 2023 21:05)  T(F): 97.5 (18 Jul 2023 00:00), Max: 100.5 (17 Jul 2023 21:05)  HR: 92 (17 Jul 2023 21:05) (85 - 92)  BP: 108/55 (17 Jul 2023 21:05) (102/40 - 108/55)  BP(mean): --  RR: 18 (17 Jul 2023 21:05) (18 - 18)  SpO2: 96% (17 Jul 2023 21:05) (94% - 100%)    Parameters below as of 17 Jul 2023 21:05  Patient On (Oxygen Delivery Method): room air        I&O's Summary          PHYSICAL EXAM:   Constitutional: NAD, awake and alert, well-developed  HEENT: PERR, EOMI, Normal Hearing, MMM  Neck:  JVD  Respiratory: Breath sounds are clear bilaterally, No wheezing, rales or rhonchi  Cardiovascular: S1 and S2, regular rate and rhythm, Murmurs, gallops or rubs  Gastrointestinal: Bowel Sounds present, soft, nontender, nondistended, no guarding, no rebound  Extremities: No peripheral edema  Vascular: 2+ peripheral pulses      MEDICATIONS:  MEDICATIONS  (STANDING):  azithromycin  IVPB      azithromycin  IVPB 500 milliGRAM(s) IV Intermittent every 24 hours  cefTRIAXone Injectable. 1000 milliGRAM(s) IV Push every 24 hours  enoxaparin Injectable 40 milliGRAM(s) SubCutaneous every 24 hours  letrozole 2.5 milliGRAM(s) Oral daily  metoprolol succinate ER 75 milliGRAM(s) Oral daily  sodium chloride 0.9%. 1000 milliLiter(s) (100 mL/Hr) IV Continuous <Continuous>  venlafaxine XR. 150 milliGRAM(s) Oral daily      LABS: All Labs Reviewed:                        12.4   7.62  )-----------( 227      ( 17 Jul 2023 05:49 )             35.3     07-17    134<L>  |  105  |  17  ----------------------------<  125<H>  3.4<L>   |  22  |  1.03    Ca    8.0<L>      17 Jul 2023 05:49  Phos  1.9     07-16  Mg     2.2     07-16    TPro  6.1  /  Alb  2.3<L>  /  TBili  0.5  /  DBili  x   /  AST  770<H>  /  ALT  290<H>  /  AlkPhos  64  07-17    PT/INR - ( 16 Jul 2023 12:53 )   PT: 13.9 sec;   INR: 1.20 ratio         PTT - ( 16 Jul 2023 12:53 )  PTT:24.7 sec  CARDIAC MARKERS ( 17 Jul 2023 13:49 )  x     / x     / 56627 U/L / x     / x      CARDIAC MARKERS ( 17 Jul 2023 05:49 )  x     / x     / 02746 U/L / x     / x      CARDIAC MARKERS ( 16 Jul 2023 23:12 )  x     / x     / 73296 U/L / x     / x      CARDIAC MARKERS ( 16 Jul 2023 12:53 )  x     / x     / 95317 U/L / x     / x          Blood Culture: Organism --  Gram Stain Blood -- Gram Stain --  Specimen Source Clean Catch None  Culture-Blood --        lipid profile          07-17 @ 05:49  cholesterol 112 mg/dL  direct LDL --  HDL 29 mg/dL  HDL/total cholesterol --  Triglyceride, serum 129 mg/dL    BNP     RADIOLOGY:    EKG:      Telemetry:    ECHO:

## 2023-07-18 NOTE — PROGRESS NOTE ADULT - SUBJECTIVE AND OBJECTIVE BOX
HOSPITALIST ATTENDING PROGRESS NOTE    Chart and meds reviewed.  Patient seen and examined.    CC: elev trop    Subjective: Pt reports feeling very weak.     All other systems reviewed and found to be negative with the exception of what has been described above.    MEDICATIONS  (STANDING):  aspirin  chewable 81 milliGRAM(s) Oral daily  azithromycin  IVPB      azithromycin  IVPB 500 milliGRAM(s) IV Intermittent every 24 hours  cefTRIAXone Injectable. 1000 milliGRAM(s) IV Push every 24 hours  clopidogrel Tablet 75 milliGRAM(s) Oral daily  letrozole 2.5 milliGRAM(s) Oral daily  metoprolol succinate ER 75 milliGRAM(s) Oral daily  sodium chloride 0.9%. 1000 milliLiter(s) (75 mL/Hr) IV Continuous <Continuous>  spironolactone 12.5 milliGRAM(s) Oral daily  venlafaxine XR. 150 milliGRAM(s) Oral daily    MEDICATIONS  (PRN):  acetaminophen     Tablet .. 650 milliGRAM(s) Oral every 6 hours PRN Mild Pain (1 - 3)  aluminum hydroxide/magnesium hydroxide/simethicone Suspension 30 milliLiter(s) Oral every 4 hours PRN Dyspepsia  melatonin 3 milliGRAM(s) Oral at bedtime PRN Insomnia  ondansetron Injectable 4 milliGRAM(s) IV Push every 6 hours PRN Nausea and/or Vomiting      VITALS:  T(F): 98.2 (07-18-23 @ 08:23), Max: 100.5 (07-17-23 @ 21:05)  HR: 63 (07-18-23 @ 08:23) (63 - 92)  BP: 119/56 (07-18-23 @ 08:23) (102/40 - 119/56)  RR: 18 (07-18-23 @ 08:23) (18 - 18)  SpO2: 98% (07-18-23 @ 08:23) (96% - 100%)  Wt(kg): --    I&O's Summary      CAPILLARY BLOOD GLUCOSE          PHYSICAL EXAM:  Gen: NAD  HEENT:  pupils equal and reactive, EOMI, no oropharyngeal lesions, erythema, exudates, oral thrush  NECK:   supple, no carotid bruits, no palpable lymph nodes, no thyromegaly  CV:  +S1, +S2, regular, no murmurs or rubs  RESP:   lungs clear to auscultation bilaterally, no wheezing, rales, rhonchi, good air entry bilaterally  BREAST:  not examined  GI:  abdomen soft, non-tender, non-distended, normal BS, no bruits, no abdominal masses, no palpable masses  RECTAL:  not examined  :  not examined  MSK:   normal muscle tone, no atrophy, no rigidity, no contractions  EXT:  no clubbing, no cyanosis, no edema, no calf pain, swelling or erythema  VASCULAR:  pulses equal and symmetric in the upper and lower extremities  NEURO:  AAOX3, no focal neurological deficits, follows all commands, able to move extremities spontaneously  SKIN:  no ulcers, lesions or rashes    LABS:                            12.2   4.70  )-----------( 202      ( 18 Jul 2023 07:21 )             35.2     07-18    135  |  107  |  20  ----------------------------<  102<H>  3.9   |  24  |  1.01    Ca    8.1<L>      18 Jul 2023 07:21    TPro  6.0  /  Alb  2.2<L>  /  TBili  0.4  /  DBili  x   /  AST  559<H>  /  ALT  271<H>  /  AlkPhos  68  07-18    CARDIAC MARKERS ( 18 Jul 2023 07:21 )  x     / x     / 84546 U/L / x     / x      CARDIAC MARKERS ( 17 Jul 2023 13:49 )  x     / x     / 35901 U/L / x     / x      CARDIAC MARKERS ( 17 Jul 2023 05:49 )  x     / x     / 46352 U/L / x     / x      CARDIAC MARKERS ( 16 Jul 2023 23:12 )  x     / x     / 74205 U/L / x     / x          LIVER FUNCTIONS - ( 18 Jul 2023 07:21 )  Alb: 2.2 g/dL / Pro: 6.0 gm/dL / ALK PHOS: 68 U/L / ALT: 271 U/L / AST: 559 U/L / GGT: x             Urinalysis Basic - ( 18 Jul 2023 07:21 )    Color: x / Appearance: x / SG: x / pH: x  Gluc: 102 mg/dL / Ketone: x  / Bili: x / Urobili: x   Blood: x / Protein: x / Nitrite: x   Leuk Esterase: x / RBC: x / WBC x   Sq Epi: x / Non Sq Epi: x / Bacteria: x    CULTURES:  BCx NG    Additional results/Imaging, I have personally reviewed:  CTH, CT cspine 7/16/23: CT HEAD: No CT evidence of acute intracranial hemorrhage. Atherosclerotic changes. If patient demonstrates persistent headaches or altered mental status, consider further evaluation via MR imaging to include DWI and ADC mapping techniques, along with gradient echo acquisition technique, provided there are no contraindications. CT CERVICAL SPINE:  No acute fracture. No AP plane subluxation. Reversal of normally visualized cervical lordosis. Multilevel degenerative changes, as described level by level in detail above.  If there is clinical concern for myelopathy or radiculopathy, consider further evaluation via MR imaging of the cervical spine, provided the patient has no contraindications.    CT c/a/p noncon 7/16/23: Large airspace consolidation with surrounding groundglass in the apicoposterior segment of the LEFT upper lobe. Trace left pleural effusion. This finding could represent pneumonia in the appropriate clinical setting. Additional differential includes pulmonary infarction (presence or absence of pulmonary embolism is not assessed on the current noncontrast exam - CT angiogram can be obtained if indicated) versus pulmonary contusion in the setting of blunt trauma. Recommend clinical correlation and follow-up imaging to document resolution and exclude the possibility of underlying malignancy. Otherwise, no acute pathology identified in the chest, abdomen or pelvis. Note that lack of IV contrast limits evaluation for acute traumatic visceral and vascular injury.  No acute osseous fracture. There is a 5 mm ill-defined sclerotic focus in the right iliac bone (image 126, series 2). Although this finding is probably benign, given history of breast cancer, metastatic disease is not reliably excluded. Consider bone scan for further evaluation. Hepatomegaly. Diffuse hepatic steatosis. 2.2 cm probable right ovarian cyst, amenable to sonographic correlation. Spiculated appearing scarring in the right breast may correlate with patient's known history of right breast malignancy. Small hiatal hernia.    B/l knee xray 7/16/23: Corticated bone fragment adjacent to the upper RIGHT femoral lateral condyle concerning for avulsed fracture versus exostosis.. CT confirmation recommended.    CT R knee noncon 7/17/23:  No acute fracture or dislocation. Osteoarthritis.    Echo 7/17/23: Mitral Valve There is calcification of both mitral valve leaflets. The leaflet opening is normal. Mild (1+) mitral regurgitation is present. EA reversal of the mitral inflow consistent with reduced compliance of the left ventricle. Aortic Valve The aortic valve is well visualized, appears mildly calcified. Valve opening seems to be normal. Tricuspid Valve Normal appearing tricuspid valve structure and function. Trace tricuspid valve regurgitation is present. Pulmonic Valve Normal appearing pulmonic valve structure. Trace to Mild pulmonic valvular regurgitation is present. Left Atrium The left atrium is mildly dilated. Left Ventricle Estimated left ventricular ejection fraction is 45 %. The left ventricle is normal in size and wall thickness. Mild, diffuse hypokinesis of the left ventricle is present. Right Atrium Normal appearing right atrium. Right Ventricle Normal appearing right ventricle structure and function. Pericardial Effusion No evidence of pericardial effusion. Pleural Effusion No evidence of pleural effusion. Miscellaneous The IVC was not visualized.    Telemetry, personally reviewed:  7/17/23: sinus   7/18/23: sinus 70s, NSVT last night

## 2023-07-19 LAB
ALBUMIN SERPL ELPH-MCNC: 2.2 G/DL — LOW (ref 3.3–5)
ALP SERPL-CCNC: 69 U/L — SIGNIFICANT CHANGE UP (ref 40–120)
ALT FLD-CCNC: 241 U/L — HIGH (ref 12–78)
ANION GAP SERPL CALC-SCNC: 7 MMOL/L — SIGNIFICANT CHANGE UP (ref 5–17)
AST SERPL-CCNC: 346 U/L — HIGH (ref 15–37)
BILIRUB SERPL-MCNC: 0.3 MG/DL — SIGNIFICANT CHANGE UP (ref 0.2–1.2)
BUN SERPL-MCNC: 20 MG/DL — SIGNIFICANT CHANGE UP (ref 7–23)
CALCIUM SERPL-MCNC: 8.3 MG/DL — LOW (ref 8.5–10.1)
CHLORIDE SERPL-SCNC: 107 MMOL/L — SIGNIFICANT CHANGE UP (ref 96–108)
CK SERPL-CCNC: 4321 U/L — HIGH (ref 26–192)
CO2 SERPL-SCNC: 21 MMOL/L — LOW (ref 22–31)
CREAT SERPL-MCNC: 0.86 MG/DL — SIGNIFICANT CHANGE UP (ref 0.5–1.3)
EGFR: 73 ML/MIN/1.73M2 — SIGNIFICANT CHANGE UP
GLUCOSE SERPL-MCNC: 113 MG/DL — HIGH (ref 70–99)
HCT VFR BLD CALC: 35.3 % — SIGNIFICANT CHANGE UP (ref 34.5–45)
HGB BLD-MCNC: 12.3 G/DL — SIGNIFICANT CHANGE UP (ref 11.5–15.5)
MCHC RBC-ENTMCNC: 30.4 PG — SIGNIFICANT CHANGE UP (ref 27–34)
MCHC RBC-ENTMCNC: 34.8 GM/DL — SIGNIFICANT CHANGE UP (ref 32–36)
MCV RBC AUTO: 87.2 FL — SIGNIFICANT CHANGE UP (ref 80–100)
PLATELET # BLD AUTO: 232 K/UL — SIGNIFICANT CHANGE UP (ref 150–400)
POTASSIUM SERPL-MCNC: 3.5 MMOL/L — SIGNIFICANT CHANGE UP (ref 3.5–5.3)
POTASSIUM SERPL-SCNC: 3.5 MMOL/L — SIGNIFICANT CHANGE UP (ref 3.5–5.3)
PROT SERPL-MCNC: 6 GM/DL — SIGNIFICANT CHANGE UP (ref 6–8.3)
RBC # BLD: 4.05 M/UL — SIGNIFICANT CHANGE UP (ref 3.8–5.2)
RBC # FLD: 13.5 % — SIGNIFICANT CHANGE UP (ref 10.3–14.5)
SODIUM SERPL-SCNC: 135 MMOL/L — SIGNIFICANT CHANGE UP (ref 135–145)
WBC # BLD: 4.59 K/UL — SIGNIFICANT CHANGE UP (ref 3.8–10.5)
WBC # FLD AUTO: 4.59 K/UL — SIGNIFICANT CHANGE UP (ref 3.8–10.5)

## 2023-07-19 PROCEDURE — 99232 SBSQ HOSP IP/OBS MODERATE 35: CPT

## 2023-07-19 PROCEDURE — 71045 X-RAY EXAM CHEST 1 VIEW: CPT | Mod: 26

## 2023-07-19 RX ORDER — FUROSEMIDE 40 MG
20 TABLET ORAL ONCE
Refills: 0 | Status: COMPLETED | OUTPATIENT
Start: 2023-07-19 | End: 2023-07-19

## 2023-07-19 RX ADMIN — LETROZOLE 2.5 MILLIGRAM(S): 2.5 TABLET, FILM COATED ORAL at 09:26

## 2023-07-19 RX ADMIN — Medication 20 MILLIGRAM(S): at 23:23

## 2023-07-19 RX ADMIN — Medication 75 MILLIGRAM(S): at 06:01

## 2023-07-19 RX ADMIN — SPIRONOLACTONE 12.5 MILLIGRAM(S): 25 TABLET, FILM COATED ORAL at 09:26

## 2023-07-19 RX ADMIN — AZITHROMYCIN 255 MILLIGRAM(S): 500 TABLET, FILM COATED ORAL at 09:27

## 2023-07-19 RX ADMIN — CLOPIDOGREL BISULFATE 75 MILLIGRAM(S): 75 TABLET, FILM COATED ORAL at 06:01

## 2023-07-19 RX ADMIN — CEFTRIAXONE 1000 MILLIGRAM(S): 500 INJECTION, POWDER, FOR SOLUTION INTRAMUSCULAR; INTRAVENOUS at 09:27

## 2023-07-19 RX ADMIN — Medication 150 MILLIGRAM(S): at 09:26

## 2023-07-19 RX ADMIN — Medication 81 MILLIGRAM(S): at 06:01

## 2023-07-19 RX ADMIN — Medication 3 MILLIGRAM(S): at 21:32

## 2023-07-19 NOTE — PROGRESS NOTE ADULT - ASSESSMENT
68 year old female w AF s/p ablation, R breast CA 2017 on letrozole, depression, HTN, neuropathy R foot, and OA, BIBEMS s/p falls.    #Elevated troponin, likely 2/2 rhabdomyolysis vs demand ischemia vs NSTEMI  -downtrending  -echo w mild diffuse hypokinesis  -s/p 24hr full a/c w lovenox, will d/c  -ASA, plavix, metop  -?cath today  -f/u cards recs    #rhabdomyolysis 2/2 fall w time down  -orthostatics neg  -CPK downtrending  -Cr ok, trend  -continue IVF  -appreciate renal recs  -PT eval    #transaminitis  -likely 2/2 rhabdo vs dehydration in setting of underlying hepatic steatosis  -trend, improving    #Severe sepsis w lactic acidosis 2/2 CAP  -on RA  -lactate downtrending  -BCx NG  -Chest imaging as above, will need repeat in 6wks  -Azithro/CTX    HFrEF  -metop  -aldactone    #Hx breast CA  -onc consulted given findings on CT, ?bone met  -bone scan to f/u, ordered    #R ovarian cyst  -outpt u/s to f/u    #DVT ppx- SQL     Medically active

## 2023-07-19 NOTE — PROGRESS NOTE ADULT - SUBJECTIVE AND OBJECTIVE BOX
HOSPITALIST ATTENDING PROGRESS NOTE    Chart and meds reviewed.  Patient seen and examined.    CC: fall    Subjective: Resting today. Awaiting cath.    All other systems reviewed and found to be negative with the exception of what has been described above.    MEDICATIONS  (STANDING):  aspirin  chewable 81 milliGRAM(s) Oral daily  cefTRIAXone Injectable. 1000 milliGRAM(s) IV Push every 24 hours  clopidogrel Tablet 75 milliGRAM(s) Oral daily  letrozole 2.5 milliGRAM(s) Oral daily  metoprolol succinate ER 75 milliGRAM(s) Oral daily  sodium chloride 0.9%. 1000 milliLiter(s) (75 mL/Hr) IV Continuous <Continuous>  spironolactone 12.5 milliGRAM(s) Oral daily  venlafaxine XR. 150 milliGRAM(s) Oral daily    MEDICATIONS  (PRN):  acetaminophen     Tablet .. 650 milliGRAM(s) Oral every 6 hours PRN Mild Pain (1 - 3)  aluminum hydroxide/magnesium hydroxide/simethicone Suspension 30 milliLiter(s) Oral every 4 hours PRN Dyspepsia  melatonin 3 milliGRAM(s) Oral at bedtime PRN Insomnia  ondansetron Injectable 4 milliGRAM(s) IV Push every 6 hours PRN Nausea and/or Vomiting      VITALS:  T(F): 98.6 (07-19-23 @ 20:46), Max: 98.6 (07-19-23 @ 20:46)  HR: 94 (07-19-23 @ 20:46) (65 - 94)  BP: 131/61 (07-19-23 @ 20:46) (113/67 - 131/61)  RR: 18 (07-19-23 @ 20:46) (18 - 18)  SpO2: 94% (07-19-23 @ 08:40) (94% - 94%)  Wt(kg): --    I&O's Summary      CAPILLARY BLOOD GLUCOSE          PHYSICAL EXAM:  Gen: NAD  HEENT:  pupils equal and reactive, EOMI, no oropharyngeal lesions, erythema, exudates, oral thrush  NECK:   supple, no carotid bruits, no palpable lymph nodes, no thyromegaly  CV:  +S1, +S2, regular, no murmurs or rubs  RESP:   lungs clear to auscultation bilaterally, no wheezing, rales, rhonchi, good air entry bilaterally  BREAST:  not examined  GI:  abdomen soft, non-tender, non-distended, normal BS, no bruits, no abdominal masses, no palpable masses  RECTAL:  not examined  :  not examined  MSK:   normal muscle tone, no atrophy, no rigidity, no contractions  EXT:  no clubbing, no cyanosis, no edema, no calf pain, swelling or erythema  VASCULAR:  pulses equal and symmetric in the upper and lower extremities  NEURO:  AAOX3, no focal neurological deficits, follows all commands, able to move extremities spontaneously  SKIN:  no ulcers, lesions or rashes    LABS:                            12.3   4.59  )-----------( 232      ( 19 Jul 2023 07:02 )             35.3     07-19    135  |  107  |  20  ----------------------------<  113<H>  3.5   |  21<L>  |  0.86    Ca    8.3<L>      19 Jul 2023 07:02    TPro  6.0  /  Alb  2.2<L>  /  TBili  0.3  /  DBili  x   /  AST  346<H>  /  ALT  241<H>  /  AlkPhos  69  07-19    CARDIAC MARKERS ( 19 Jul 2023 07:02 )  x     / x     / 4321 U/L / x     / x      CARDIAC MARKERS ( 18 Jul 2023 07:21 )  x     / x     / 78422 U/L / x     / x          LIVER FUNCTIONS - ( 19 Jul 2023 07:02 )  Alb: 2.2 g/dL / Pro: 6.0 gm/dL / ALK PHOS: 69 U/L / ALT: 241 U/L / AST: 346 U/L / GGT: x             Urinalysis Basic - ( 19 Jul 2023 07:02 )    Color: x / Appearance: x / SG: x / pH: x  Gluc: 113 mg/dL / Ketone: x  / Bili: x / Urobili: x   Blood: x / Protein: x / Nitrite: x   Leuk Esterase: x / RBC: x / WBC x   Sq Epi: x / Non Sq Epi: x / Bacteria: x    CULTURES:  BCx NG    Additional results/Imaging, I have personally reviewed:  CTH, CT cspine 7/16/23: CT HEAD: No CT evidence of acute intracranial hemorrhage. Atherosclerotic changes. If patient demonstrates persistent headaches or altered mental status, consider further evaluation via MR imaging to include DWI and ADC mapping techniques, along with gradient echo acquisition technique, provided there are no contraindications. CT CERVICAL SPINE:  No acute fracture. No AP plane subluxation. Reversal of normally visualized cervical lordosis. Multilevel degenerative changes, as described level by level in detail above.  If there is clinical concern for myelopathy or radiculopathy, consider further evaluation via MR imaging of the cervical spine, provided the patient has no contraindications.    CT c/a/p noncon 7/16/23: Large airspace consolidation with surrounding groundglass in the apicoposterior segment of the LEFT upper lobe. Trace left pleural effusion. This finding could represent pneumonia in the appropriate clinical setting. Additional differential includes pulmonary infarction (presence or absence of pulmonary embolism is not assessed on the current noncontrast exam - CT angiogram can be obtained if indicated) versus pulmonary contusion in the setting of blunt trauma. Recommend clinical correlation and follow-up imaging to document resolution and exclude the possibility of underlying malignancy. Otherwise, no acute pathology identified in the chest, abdomen or pelvis. Note that lack of IV contrast limits evaluation for acute traumatic visceral and vascular injury.  No acute osseous fracture. There is a 5 mm ill-defined sclerotic focus in the right iliac bone (image 126, series 2). Although this finding is probably benign, given history of breast cancer, metastatic disease is not reliably excluded. Consider bone scan for further evaluation. Hepatomegaly. Diffuse hepatic steatosis. 2.2 cm probable right ovarian cyst, amenable to sonographic correlation. Spiculated appearing scarring in the right breast may correlate with patient's known history of right breast malignancy. Small hiatal hernia.    B/l knee xray 7/16/23: Corticated bone fragment adjacent to the upper RIGHT femoral lateral condyle concerning for avulsed fracture versus exostosis.. CT confirmation recommended.    CT R knee noncon 7/17/23:  No acute fracture or dislocation. Osteoarthritis.    Echo 7/17/23: Mitral Valve There is calcification of both mitral valve leaflets. The leaflet opening is normal. Mild (1+) mitral regurgitation is present. EA reversal of the mitral inflow consistent with reduced compliance of the left ventricle. Aortic Valve The aortic valve is well visualized, appears mildly calcified. Valve opening seems to be normal. Tricuspid Valve Normal appearing tricuspid valve structure and function. Trace tricuspid valve regurgitation is present. Pulmonic Valve Normal appearing pulmonic valve structure. Trace to Mild pulmonic valvular regurgitation is present. Left Atrium The left atrium is mildly dilated. Left Ventricle Estimated left ventricular ejection fraction is 45 %. The left ventricle is normal in size and wall thickness. Mild, diffuse hypokinesis of the left ventricle is present. Right Atrium Normal appearing right atrium. Right Ventricle Normal appearing right ventricle structure and function. Pericardial Effusion No evidence of pericardial effusion. Pleural Effusion No evidence of pleural effusion. Miscellaneous The IVC was not visualized.    Telemetry, personally reviewed:  7/17/23: sinus   7/18/23: sinus 70s, NSVT last night  7/19/23: sinus 60s

## 2023-07-19 NOTE — PROVIDER CONTACT NOTE (OTHER) - ASSESSMENT
Pt on RA with O2 Sat 86%. Pt endorses labored breathing and SOB. Crackles auscultated RLL. Pt receiving NS @75mL/hr.

## 2023-07-19 NOTE — PROGRESS NOTE ADULT - SUBJECTIVE AND OBJECTIVE BOX
CHIEF COMPLAINT: Patient is a 68y old  Female who presents with a chief complaint of elevated troponin  acute rhabdomyolysis   falls (18 Jul 2023 17:01)      HPI:  68 year old female w AF s/p ablation, R breast CA 2017 on letrozole, depression, HTN, neuropathy R foot, and OA, BIBEMS s/p falls      Thursday 07-13 she fell outside in her yard as she was out w her 2 dogs; fell backwards; no head injury or LOC  on the ground for 2 hrs before she was able to get up; scooted to her door  Yesterday was on couch when she rolled on to floor; scooted on her knees and arms to toilet but could not get up   was on the floor until her sister came;  finally pt called the ambulance    when EMS arrived, tried helping pt ambulate but was unable to walk.    pt reports increasing fatigue over past few days  +occasional cough  Wednesday 07-12 her friend visited; no known sick contacts but friend has  grandchildren        In ED /50   HR 92   RR 17   T 100.4   94% RA     .  CT head no acute changes  CT Cspine DJD w narrowing  CT C/A/P  +KARELY PNA, +HM w steatosis, +sclerotic focus R iliac bone  R knee w DJD  cefepime  vanco    7/17-patient well known to me; has above medical history; did have multiple ECHO with normal LV function but no ischemic w/u in the past.   Very elevated troponin levels with in face of rhabdomyolysis with no chest pains ano EKG changes.       7/18-patient with ECHO done l denying chest pains, but with c/o palpitations.   Patient febrile to 100.5-receiving ceftriaxone for suspected pneumonia.  Telemetry monitor showing runs of SVT, (no afib).  ECHO with EF 40% and inferior wall hypokinesis    7/19-patient tolerating medications; now off Eliquis for 48 hours.   On aSA/plavix and beta blockers; spironolactone added for CHF treatment.  ECHO with EF 40% with inferior hypokinesis.  Being treated for pneumonia.   LFTs and CPKs still elevated.  D/w cath team to get patient on schedule for cath.    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 and gentry Willett (16 Jul 2023 22:06)      PMHx: PAST MEDICAL & SURGICAL HISTORY:  Breast lump  benign      Malignant neoplasm of right female breast, unspecified site of breast      Breast pain, right      Breast skin changes  right breast      Essential hypertension      SVT (supraventricular tachycardia)      Microscopic hematuria      Osteoarthritis  knees      Rosacea      H/O cardiac radiofrequency ablation      History of lumpectomy of right breast  benign -2003      H/O right breast biopsy  2017      S/P colonoscopy          Allergies: Allergies    red dye (Other)  Cipro (Other)    Intolerances    spinach (Vomiting)        REVIEW OF SYSTEMS:    CONSTITUTIONAL: No weakness, fevers or chills  EYES/ENT: No visual changes;  No vertigo or throat pain   NECK: No pain or stiffness  RESPIRATORY: No cough, wheezing, hemoptysis; No shortness of breath  CARDIOVASCULAR: No chest pain or palpitations  GASTROINTESTINAL: No abdominal or epigastric pain. No nausea, vomiting, or hematemesis; No diarrhea or constipation. No melena or hematochezia.  GENITOURINARY: No dysuria, frequency or hematuria  NEUROLOGICAL: No numbness or weakness  SKIN: No itching, burning, rashes, or lesions   All other review of systems is negative unless indicated above    Vital Signs Last 24 Hrs  T(C): 37 (18 Jul 2023 20:33), Max: 37 (18 Jul 2023 20:33)  T(F): 98.6 (18 Jul 2023 20:33), Max: 98.6 (18 Jul 2023 20:33)  HR: 72 (19 Jul 2023 06:00) (63 - 79)  BP: 113/67 (19 Jul 2023 06:00) (113/67 - 126/47)  BP(mean): --  RR: 18 (18 Jul 2023 20:33) (18 - 18)  SpO2: 100% (18 Jul 2023 20:33) (98% - 100%)    Parameters below as of 18 Jul 2023 20:33  Patient On (Oxygen Delivery Method): room air        I&O's Summary          PHYSICAL EXAM:   Constitutional: NAD, awake and alert, well-developed  HEENT: PERR, EOMI, Normal Hearing, MMM  Neck: Soft and supple, No LAD, No JVD  Respiratory: Breath sounds are clear bilaterally, No wheezing, rales or rhonchi  Cardiovascular: S1 and S2, regular rate and rhythm, no Murmurs, gallops or rubs  Gastrointestinal: Bowel Sounds present, soft, nontender, nondistended, no guarding, no rebound  Extremities: No peripheral edema  Vascular: 2+ peripheral pulses  Neurological: A/O x 3, no focal deficits  Musculoskeletal: 5/5 strength b/l upper and lower extremities  Skin: No rashes    MEDICATIONS:  MEDICATIONS  (STANDING):  aspirin  chewable 81 milliGRAM(s) Oral daily  azithromycin  IVPB      azithromycin  IVPB 500 milliGRAM(s) IV Intermittent every 24 hours  cefTRIAXone Injectable. 1000 milliGRAM(s) IV Push every 24 hours  clopidogrel Tablet 75 milliGRAM(s) Oral daily  letrozole 2.5 milliGRAM(s) Oral daily  metoprolol succinate ER 75 milliGRAM(s) Oral daily  sodium chloride 0.9%. 1000 milliLiter(s) (75 mL/Hr) IV Continuous <Continuous>  spironolactone 12.5 milliGRAM(s) Oral daily  venlafaxine XR. 150 milliGRAM(s) Oral daily      LABS: All Labs Reviewed:                        12.2   4.70  )-----------( 202      ( 18 Jul 2023 07:21 )             35.2     07-18    135  |  107  |  20  ----------------------------<  102<H>  3.9   |  24  |  1.01    Ca    8.1<L>      18 Jul 2023 07:21    TPro  6.0  /  Alb  2.2<L>  /  TBili  0.4  /  DBili  x   /  AST  559<H>  /  ALT  271<H>  /  AlkPhos  68  07-18      CARDIAC MARKERS ( 18 Jul 2023 07:21 )  x     / x     / 96806 U/L / x     / x      CARDIAC MARKERS ( 17 Jul 2023 13:49 )  x     / x     / 81387 U/L / x     / x          Blood Culture: Organism --  Gram Stain Blood -- Gram Stain --  Specimen Source Clean Catch None  Culture-Blood --        lipid profile          07-17 @ 05:49  cholesterol 112 mg/dL  direct LDL --  HDL 29 mg/dL  HDL/total cholesterol --  Triglyceride, serum 129 mg/dL    BNP     RADIOLOGY:    EKG:      Telemetry:    ECHO:   Findings     Mitral Valve   There is calcification of both mitral valve leaflets. The leaflet opening   is normal.   Mild (1+) mitral regurgitation is present.   EA reversal of the mitral inflow consistent with reduced compliance of   the   left ventricle.     Aortic Valve   The aortic valve is well visualized, appears mildly calcified. Valve   opening seems to be normal.     Tricuspid Valve   Normal appearing tricuspid valve structure and function.   Trace tricuspid valve regurgitation is present.     Pulmonic Valve   Normal appearing pulmonic valve structure.  Trace to Mild pulmonic valvular regurgitation is present.     Left Atrium   The left atrium is mildly dilated.     Left Ventricle   Estimated left ventricular ejection fraction is 45 %.   The left ventricle is normal in size and wall thickness.   Mild, diffuse hypokinesis of the left ventricle is present.     Right Atrium   Normal appearing right atrium.     Right Ventricle   Normal appearing right ventricle structure and function.     Pericardial Effusion   No evidence of pericardial effusion.     Pleural Effusion   No evidence of pleural effusion.     Miscellaneous   The IVC was not visualized.

## 2023-07-19 NOTE — PROGRESS NOTE ADULT - ASSESSMENT
patient with resolving rhabdo/pneumonia; with elevated troponin levels and change in LV function.  Telemetry with SVT and NSVT  1-continue medications and set up for cardiac catheterization today (or tomorrow)

## 2023-07-19 NOTE — PROGRESS NOTE ADULT - SUBJECTIVE AND OBJECTIVE BOX
NEPHROLOGY INTERVAL HPI/OVERNIGHT EVENTS:    Date of Service: 23 @ 09:26    --For Cardiac cath later in the day. Feeling well.  No SOB.   feels better able to tolerate po, too weak to get out of bed, tele withruns of svt, metoprolol inc   HPI:  68 year old female w AF s/p ablation, R breast CA  on letrozole, depression, HTN, neuropathy R foot, and OA, BIBEMS s/p falls       she fell outside in her yard as she was out w her 2 dogs; fell backwards; no head injury or LOC  on the ground for 2 hrs before she was able to get up; scooted to her door  Yesterday was on couch when she rolled on to floor; scooted on her knees and arms to toilet but could not get up   was on the floor until her sister came;  finally pt called the ambulance    when EMS arrived, tried helping pt ambulate but was unable to walk.    pt reports increasing fatigue over past few days  +occasional cough   her friend visited; no known sick contacts but friend has  grandchildren      In ED /50   HR 92   RR 17   T 100.4   94% RA     .  CT head no acute changes  CT Cspine DJD w narrowing  CT C/A/P  +KARELY PNA, +HM w steatosis, +sclerotic focus R iliac bone  R knee w DJD  cefepime  vanco  ----------------------------------------------------------------------------  NEPHROLOGY CONSULT   above hx corroborated with pt   denies  any diuretics use, no sick contacts  no f/c/ no n/v/d  po intake adequate  feels better with ivf  + trop with tele with no events so far, for echo         MEDICATIONS  (STANDING):  aspirin  chewable 81 milliGRAM(s) Oral daily  azithromycin  IVPB      azithromycin  IVPB 500 milliGRAM(s) IV Intermittent every 24 hours  cefTRIAXone Injectable. 1000 milliGRAM(s) IV Push every 24 hours  clopidogrel Tablet 75 milliGRAM(s) Oral daily  letrozole 2.5 milliGRAM(s) Oral daily  metoprolol succinate ER 75 milliGRAM(s) Oral daily  sodium chloride 0.9%. 1000 milliLiter(s) (75 mL/Hr) IV Continuous <Continuous>  spironolactone 12.5 milliGRAM(s) Oral daily  venlafaxine XR. 150 milliGRAM(s) Oral daily    MEDICATIONS  (PRN):  acetaminophen     Tablet .. 650 milliGRAM(s) Oral every 6 hours PRN Mild Pain (1 - 3)  aluminum hydroxide/magnesium hydroxide/simethicone Suspension 30 milliLiter(s) Oral every 4 hours PRN Dyspepsia  melatonin 3 milliGRAM(s) Oral at bedtime PRN Insomnia  ondansetron Injectable 4 milliGRAM(s) IV Push every 6 hours PRN Nausea and/or Vomiting    Vital Signs Last 24 Hrs  T(C): 36.7 (2023 08:40), Max: 37 (2023 20:33)  T(F): 98 (2023 08:40), Max: 98.6 (2023 20:33)  HR: 65 (2023 08:40) (65 - 79)  BP: 118/49 (2023 08:40) (113/67 - 126/47)  BP(mean): --  RR: 18 (2023 08:40) (18 - 18)  SpO2: 94% (2023 08:40) (94% - 100%)    Parameters below as of 2023 08:40  Patient On (Oxygen Delivery Method): room air      Daily Height in cm: 180.34 (2023 11:41)    Daily Weight in k.9 (2023 06:00)      PHYSICAL EXAM:  GENERAL: No distress  CHEST/LUNG: Fair air entry  HEART: S1S2 RRR  ABDOMEN: soft  EXTREMITIES: no edema  SKIN:     LABS:                        12.3   4.59  )-----------( 232      ( 2023 07:02 )             35.3     07-19    135  |  107  |  20  ----------------------------<  113<H>  3.5   |  21<L>  |  0.86    Ca    8.3<L>      2023 07:02    TPro  6.0  /  Alb  2.2<L>  /  TBili  0.3  /  DBili  x   /  AST  346<H>  /  ALT  241<H>  /  AlkPhos  69  -      Urinalysis Basic - ( 2023 07:02 )    Color: x / Appearance: x / SG: x / pH: x  Gluc: 113 mg/dL / Ketone: x  / Bili: x / Urobili: x   Blood: x / Protein: x / Nitrite: x   Leuk Esterase: x / RBC: x / WBC x   Sq Epi: x / Non Sq Epi: x / Bacteria: x              RADIOLOGY & ADDITIONAL TESTS:

## 2023-07-19 NOTE — PROGRESS NOTE ADULT - ASSESSMENT
69 yo with hx of arrhythmias and breast ca on letrozole presents with weakness leading to falls leading to rhabdo.  Hyponatremia depletional   + trop    REC  - cw ivf and fu trend of cpk  - fu trend of Na with ivf   - + trop in setting of rhabdo, echo and tele monitoring     7/18 MK   - Rhabdo cpk improving     cw ivf, will dec rate   - resolved hyponatremia   - weakness with runs of svt: metoprolol inc    echo with ef 40%    started on spironolactone and for cath in am     7/19 SY  --Rhabdo : CPK continuing to trend down.  Continue IVF for another 24 hours with Cath today.  --Hyponatremia : resolved and stable.  --Cardiac /arrhythmia : for cath today.  --CHF : restarted Spironolactone. d/c IVF in am.

## 2023-07-19 NOTE — PROGRESS NOTE ADULT - SUBJECTIVE AND OBJECTIVE BOX
Hematology/Oncology    Pt seen, lethargic today  no bleeding    MEDICATIONS  (STANDING):  aspirin  chewable 81 milliGRAM(s) Oral daily  cefTRIAXone Injectable. 1000 milliGRAM(s) IV Push every 24 hours  clopidogrel Tablet 75 milliGRAM(s) Oral daily  letrozole 2.5 milliGRAM(s) Oral daily  metoprolol succinate ER 75 milliGRAM(s) Oral daily  sodium chloride 0.9%. 1000 milliLiter(s) (75 mL/Hr) IV Continuous <Continuous>  spironolactone 12.5 milliGRAM(s) Oral daily  venlafaxine XR. 150 milliGRAM(s) Oral daily    MEDICATIONS  (PRN):  acetaminophen     Tablet .. 650 milliGRAM(s) Oral every 6 hours PRN Mild Pain (1 - 3)  aluminum hydroxide/magnesium hydroxide/simethicone Suspension 30 milliLiter(s) Oral every 4 hours PRN Dyspepsia  melatonin 3 milliGRAM(s) Oral at bedtime PRN Insomnia  ondansetron Injectable 4 milliGRAM(s) IV Push every 6 hours PRN Nausea and/or Vomiting      ROS  No fever, sweats, chills     Vital Signs Last 24 Hrs  T(C): 36.7 (19 Jul 2023 08:40), Max: 37 (18 Jul 2023 20:33)  T(F): 98 (19 Jul 2023 08:40), Max: 98.6 (18 Jul 2023 20:33)  HR: 65 (19 Jul 2023 08:40) (65 - 79)  BP: 118/49 (19 Jul 2023 08:40) (113/67 - 126/47)  BP(mean): --  RR: 18 (19 Jul 2023 08:40) (18 - 18)  SpO2: 94% (19 Jul 2023 08:40) (94% - 100%)    Parameters below as of 19 Jul 2023 08:40  Patient On (Oxygen Delivery Method): room air        PE  NAD  Awake, alert  Anicteric, MMM  RRR  CTAB  Abd soft, NT, ND  No c/c/e  No rash grossly  FROM                          12.3   4.59  )-----------( 232      ( 19 Jul 2023 07:02 )             35.3       07-19    135  |  107  |  20  ----------------------------<  113<H>  3.5   |  21<L>  |  0.86    Ca    8.3<L>      19 Jul 2023 07:02    TPro  6.0  /  Alb  2.2<L>  /  TBili  0.3  /  DBili  x   /  AST  346<H>  /  ALT  241<H>  /  AlkPhos  69  07-19

## 2023-07-20 LAB
ALBUMIN SERPL ELPH-MCNC: 2.2 G/DL — LOW (ref 3.3–5)
ALP SERPL-CCNC: 76 U/L — SIGNIFICANT CHANGE UP (ref 40–120)
ALT FLD-CCNC: 219 U/L — HIGH (ref 12–78)
ANION GAP SERPL CALC-SCNC: 5 MMOL/L — SIGNIFICANT CHANGE UP (ref 5–17)
AST SERPL-CCNC: 234 U/L — HIGH (ref 15–37)
BILIRUB SERPL-MCNC: 0.3 MG/DL — SIGNIFICANT CHANGE UP (ref 0.2–1.2)
BUN SERPL-MCNC: 19 MG/DL — SIGNIFICANT CHANGE UP (ref 7–23)
CALCIUM SERPL-MCNC: 8.5 MG/DL — SIGNIFICANT CHANGE UP (ref 8.5–10.1)
CHLORIDE SERPL-SCNC: 108 MMOL/L — SIGNIFICANT CHANGE UP (ref 96–108)
CK SERPL-CCNC: 1593 U/L — HIGH (ref 26–192)
CO2 SERPL-SCNC: 24 MMOL/L — SIGNIFICANT CHANGE UP (ref 22–31)
CREAT SERPL-MCNC: 0.84 MG/DL — SIGNIFICANT CHANGE UP (ref 0.5–1.3)
EGFR: 75 ML/MIN/1.73M2 — SIGNIFICANT CHANGE UP
GLUCOSE SERPL-MCNC: 115 MG/DL — HIGH (ref 70–99)
HCT VFR BLD CALC: 35.8 % — SIGNIFICANT CHANGE UP (ref 34.5–45)
HGB BLD-MCNC: 12.3 G/DL — SIGNIFICANT CHANGE UP (ref 11.5–15.5)
MCHC RBC-ENTMCNC: 30.4 PG — SIGNIFICANT CHANGE UP (ref 27–34)
MCHC RBC-ENTMCNC: 34.4 GM/DL — SIGNIFICANT CHANGE UP (ref 32–36)
MCV RBC AUTO: 88.6 FL — SIGNIFICANT CHANGE UP (ref 80–100)
PLATELET # BLD AUTO: 240 K/UL — SIGNIFICANT CHANGE UP (ref 150–400)
POTASSIUM SERPL-MCNC: 3.8 MMOL/L — SIGNIFICANT CHANGE UP (ref 3.5–5.3)
POTASSIUM SERPL-SCNC: 3.8 MMOL/L — SIGNIFICANT CHANGE UP (ref 3.5–5.3)
PROT SERPL-MCNC: 6 GM/DL — SIGNIFICANT CHANGE UP (ref 6–8.3)
RBC # BLD: 4.04 M/UL — SIGNIFICANT CHANGE UP (ref 3.8–5.2)
RBC # FLD: 13.9 % — SIGNIFICANT CHANGE UP (ref 10.3–14.5)
SODIUM SERPL-SCNC: 137 MMOL/L — SIGNIFICANT CHANGE UP (ref 135–145)
WBC # BLD: 4.67 K/UL — SIGNIFICANT CHANGE UP (ref 3.8–10.5)
WBC # FLD AUTO: 4.67 K/UL — SIGNIFICANT CHANGE UP (ref 3.8–10.5)

## 2023-07-20 PROCEDURE — 99232 SBSQ HOSP IP/OBS MODERATE 35: CPT

## 2023-07-20 RX ORDER — SODIUM CHLORIDE 9 MG/ML
1000 INJECTION INTRAMUSCULAR; INTRAVENOUS; SUBCUTANEOUS
Refills: 0 | Status: DISCONTINUED | OUTPATIENT
Start: 2023-07-20 | End: 2023-07-22

## 2023-07-20 RX ORDER — SODIUM CHLORIDE 9 MG/ML
250 INJECTION INTRAMUSCULAR; INTRAVENOUS; SUBCUTANEOUS ONCE
Refills: 0 | Status: DISCONTINUED | OUTPATIENT
Start: 2023-07-20 | End: 2023-07-22

## 2023-07-20 RX ADMIN — CEFTRIAXONE 1000 MILLIGRAM(S): 500 INJECTION, POWDER, FOR SOLUTION INTRAMUSCULAR; INTRAVENOUS at 10:23

## 2023-07-20 RX ADMIN — Medication 81 MILLIGRAM(S): at 05:44

## 2023-07-20 RX ADMIN — Medication 150 MILLIGRAM(S): at 10:23

## 2023-07-20 RX ADMIN — Medication 75 MILLIGRAM(S): at 05:44

## 2023-07-20 RX ADMIN — CLOPIDOGREL BISULFATE 75 MILLIGRAM(S): 75 TABLET, FILM COATED ORAL at 05:44

## 2023-07-20 RX ADMIN — LETROZOLE 2.5 MILLIGRAM(S): 2.5 TABLET, FILM COATED ORAL at 10:22

## 2023-07-20 RX ADMIN — SPIRONOLACTONE 12.5 MILLIGRAM(S): 25 TABLET, FILM COATED ORAL at 10:23

## 2023-07-20 NOTE — BRIEF OPERATIVE NOTE - NSICDXBRIEFPOSTOP_GEN_ALL_CORE_FT
POST-OP DIAGNOSIS:  Nonobstructive atherosclerosis of coronary artery 20-Jul-2023 09:39:19  Rolando Dyer-Barber

## 2023-07-20 NOTE — PACU DISCHARGE NOTE - COMMENTS
Pt tolerated well pt verbalizes understanding of post procedure instruction education provided report given to Safia verified on tele maintained safety

## 2023-07-20 NOTE — PACU DISCHARGE NOTE - NS MD DISCHARGE NOTE DISCHARGE
Floor CONST: Well appearing in NAD  EYES: PERRL, EOMI, + fluorescin uptake of eye at 12 o'clock, no FB, no pain with EOM, visual acuity ***, Sclera and conjunctiva clear.  ENT: Oropharynx normal appearing, no erythema or exudates. Uvula midline.  NEURO: A&Ox3, No focal deficits

## 2023-07-20 NOTE — PROGRESS NOTE ADULT - ASSESSMENT
68 year old woman with hx of HTN, AF s/p ablation, R breast CA 2017 on letrozole, R foot neuropathy, osteoartritis, depression, here for further evaluation after a fall, trouble getting up.     Fall  Orthostatics negative. Appears to have been a mechanical fall.  - PT eval    Rhabdomyolysis  CPK downtrending. Patient improved with IVF hydration  - Encourage hydration    Type II MI  Troponin elevated to 15,000 now downtrending. TTE done, diffuse hypokinesis. L hearth cath done today, no occlusive CAD.   - Continue medical management    Hepatic steatosis with mild transaminitis  LFTs gradually improving  - Continue to monitor as outpatient    Severe sepsis w lactic acidosis due to community acquired pneumonia, unable to rule out Gram-negative organism, present upon admission  Lactate improved. Blood cultures negative. Breathing comfortably. S.p azithromycin. On ceftriaxone day 4.  - Continue ceftriaxone, plan to complete 5 days tomorrow    HFrEF  Chronic, stable no sign of decompensation  - Continue metoprolol, spironolactone    Hx breast CA  Onc consult appreciated. Sclerotic focus on R iliac, likely benign but given breast cancer hx, patient is ordered for NM bone scan  - F/u bone scan     R ovarian cyst  As noted on imaging  - Outpatient follow up       68 year old woman with hx of HTN, AF s/p ablation, R breast CA 2017 on letrozole, R foot neuropathy, osteoartritis, depression, here for further evaluation after a fall, trouble getting up.     Fall  Orthostatics negative. Appears to have been a mechanical fall. Seen by PT, recommended [facility]  - Continue restorative PT sessions  - OOB to chair daily    Rhabdomyolysis  CPK downtrending. Patient improved with IVF hydration  - Encourage hydration    Type II MI  Troponin elevated to 15,000 now downtrending. TTE done, diffuse hypokinesis. L hearth cath done today, no occlusive CAD.   - Continue medical management    Hepatic steatosis with mild transaminitis  LFTs gradually improving  - Continue to monitor as outpatient    Severe sepsis w lactic acidosis due to community acquired pneumonia, unable to rule out Gram-negative organism, present upon admission  Lactate improved. Blood cultures negative. Breathing comfortably. S.p azithromycin. On ceftriaxone day 4.  - Continue ceftriaxone, plan to complete 5 days tomorrow    HFrEF  Chronic, stable no sign of decompensation  - Continue metoprolol, spironolactone    Hx breast CA  Onc consult appreciated. Sclerotic focus on R iliac, likely benign but given breast cancer hx, patient is ordered for NM bone scan  - F/u bone scan     R ovarian cyst  As noted on imaging  - Outpatient follow up      Dispo: Approaching clinical stability for DC to [facility], possibly in 24-48 hrs

## 2023-07-20 NOTE — CHART NOTE - NSCHARTNOTEFT_GEN_A_CORE
68 year old female w AF s/p ablation, R breast CA 2017 on letrozole, depression, HTN, neuropathy R foot, and OA, BIBEMS s/p falls, found to have elevated troponin, peak a t 34100.47.   Pt brought to cath lab for cardiac cath with possible PCI.   Seen and examined Pt in holding.   VSS, A+O x4, denies chest pain, sob or palpitation at this time.   - JAYLAN protocol IV hydration: NS 250cc IVbolus x1   - Procedure, its risks, alternatives, benefits and potential complications were discussed in detail. Risks include but not limited to bleeding, infection, allergy, renal failure requiring dialysis, stroke, vascular injury, pericardial tamponade, arrhythmias, MI and even death. Pt is agreeable and has consented for coronary angiogram with possible stent placement and possible sedation and/or analgesia.       ASA class: III   Cr: 1.01  GFR: 75   Bleeding risk: 2.3%   Jack score: 4 points

## 2023-07-20 NOTE — PROGRESS NOTE ADULT - SUBJECTIVE AND OBJECTIVE BOX
NEPHROLOGY INTERVAL HPI/OVERNIGHT EVENTS:    Date of Service: 23 @ 12:36    --Post Cardiac cath.--non obstructive CAD.  Continues to feel weak and unable to participate with PT.  --For Cardiac cath later in the day. Feeling well.  No SOB.   feels better able to tolerate po, too weak to get out of bed, tele withruns of svt, metoprolol inc   HPI:  68 year old female w AF s/p ablation, R breast CA  on letrozole, depression, HTN, neuropathy R foot, and OA, BIBEMS s/p falls       she fell outside in her yard as she was out w her 2 dogs; fell backwards; no head injury or LOC  on the ground for 2 hrs before she was able to get up; scooted to her door  Yesterday was on couch when she rolled on to floor; scooted on her knees and arms to toilet but could not get up   was on the floor until her sister came;  finally pt called the ambulance    when EMS arrived, tried helping pt ambulate but was unable to walk.    pt reports increasing fatigue over past few days  +occasional cough   her friend visited; no known sick contacts but friend has  grandchildren      In ED /50   HR 92   RR 17   T 100.4   94% RA     .  CT head no acute changes  CT Cspine DJD w narrowing  CT C/A/P  +[facility] PNA, +HM w steatosis, +sclerotic focus R iliac bone  R knee w DJD  cefepime  vanco  ----------------------------------------------------------------------------  NEPHROLOGY CONSULT   above hx corroborated with pt   denies  any diuretics use, no sick contacts  no f/c/ no n/v/d  po intake adequate  feels better with ivf  + trop with tele with no events so far, for echo       MEDICATIONS  (STANDING):  aspirin  chewable 81 milliGRAM(s) Oral daily  cefTRIAXone Injectable. 1000 milliGRAM(s) IV Push every 24 hours  letrozole 2.5 milliGRAM(s) Oral daily  metoprolol succinate ER 75 milliGRAM(s) Oral daily  sodium chloride 0.9% Bolus 250 milliLiter(s) IV Bolus once  sodium chloride 0.9%. 1000 milliLiter(s) (200 mL/Hr) IV Continuous <Continuous>  spironolactone 12.5 milliGRAM(s) Oral daily  venlafaxine XR. 150 milliGRAM(s) Oral daily    MEDICATIONS  (PRN):  acetaminophen     Tablet .. 650 milliGRAM(s) Oral every 6 hours PRN Mild Pain (1 - 3)  aluminum hydroxide/magnesium hydroxide/simethicone Suspension 30 milliLiter(s) Oral every 4 hours PRN Dyspepsia  melatonin 3 milliGRAM(s) Oral at bedtime PRN Insomnia  ondansetron Injectable 4 milliGRAM(s) IV Push every 6 hours PRN Nausea and/or Vomiting    Vital Signs Last 24 Hrs  T(C): 37 (2023 20:46), Max: 37 (2023 20:46)  T(F): 98.6 (2023 20:46), Max: 98.6 (2023 20:46)  HR: 80 (2023 09:55) (73 - 94)  BP: 125/59 (2023 09:55) (111/65 - 143/63)  BP(mean): --  RR: 17 (2023 09:55) (16 - 20)  SpO2: 93% (2023 09:55) (86% - 93%)    Parameters below as of 2023 09:55  Patient On (Oxygen Delivery Method): nasal cannula  O2 Flow (L/min): 4    Daily     Daily Weight in k.6 (2023 06:39)      PHYSICAL EXAM:  GENERAL: No distress  CHEST/LUNG: Fair air entry  HEART: S1S2 RRR  ABDOMEN: soft  EXTREMITIES: no edema  SKIN:     LABS:                        12.3   4.67  )-----------( 240      ( 2023 06:42 )             35.8     07-20    137  |  108  |  19  ----------------------------<  115<H>  3.8   |  24  |  0.84    Ca    8.5      2023 06:42    TPro  6.0  /  Alb  2.2<L>  /  TBili  0.3  /  DBili  x   /  AST  234<H>  /  ALT  219<H>  /  AlkPhos  76  07-20      Urinalysis Basic - ( 2023 06:42 )    Color: x / Appearance: x / SG: x / pH: x  Gluc: 115 mg/dL / Ketone: x  / Bili: x / Urobili: x   Blood: x / Protein: x / Nitrite: x   Leuk Esterase: x / RBC: x / WBC x   Sq Epi: x / Non Sq Epi: x / Bacteria: x              RADIOLOGY & ADDITIONAL TESTS:

## 2023-07-20 NOTE — PROGRESS NOTE ADULT - SUBJECTIVE AND OBJECTIVE BOX
Chief Complaint: Fall    Interval Hx: No acute complaints. Feels overall stable. No dizziness or lightheadedness. No palpitations. No chest pain or tightness. No dyspnea. No abdominal symptoms. No urinary complaints.     ROS: Multi system review is comprehensively negative x 10 systems     Vitals:  T(F): 98.1 (20 Jul 2023 12:40), Max: 98.6 (19 Jul 2023 20:46)  HR: 81 (20 Jul 2023 12:40) (73 - 94)  BP: 119/65 (20 Jul 2023 12:40) (111/65 - 143/63)  RR: 18 (20 Jul 2023 12:40) (16 - 20)  SpO2: 94% (20 Jul 2023 12:40) (86% - 94%) on 2L /min O2     Exam:  GEN: Comfortable appearing  HEENT: NCAT  pupils equal and reactive, EOMI, no oropharyngeal lesions, erythema, exudates, oral thrush  NECK: Supple, no carotid bruits, no palpable lymph nodes, no thyromegaly  CVS:  +S1, +S2, regular, no murmurs or rubs  RESP: Lungs clear to auscultation bilaterally, no wheezing, rales, rhonchi, good air entry bilaterally  GI: Abdomen soft, non-tender, non-distended, normal BS, no bruits, no abdominal masses, no palpable masses  EXT: Normal muscle tone, no atrophy, no rigidity, no contractions, no clubbing, no cyanosis, no edema, no calf pain, swelling or erythema, pulses equal and symmetric in the upper and lower extremities  SKIN:  no ulcers, lesions or rashes  NEURO:  AAOX3, no focal neurological deficits, follows all commands, able to move extremities spontaneously    LABS:                                 12.3   4.67  )-----------( 240                  35.8         137  |  108  |  19  ---------------------<  115  3.8   |  24  |  0.84    Ca 8.5          TPro  6.0  /  Alb  2.2  /  TBili  0.3  /  DBili  x   /  AST  234  /  ALT  219  /  AlkPhos  76      Troponin 15,000 --> 8750    Blood cultures negative    Imaging:  CT R knee noncon 7/17/23:  No acute fracture or dislocation. Osteoarthritis.    XR B/L knees 7/16/23: Corticated bone fragment adjacent to the upper R femoral lateral condyle concerning for avulsed fracture versus exostosis.    CT A/P WO 7/16/23: There is a 5 mm ill-defined sclerotic focus in the right iliac bone. Although this finding is probably benign, given history of breast cancer, metastatic disease is not reliably excluded. Consider bone scan for further evaluation. Hepatomegaly. Diffuse hepatic steatosis. 2.2 cm probable right ovarian cyst, amenable to sonographic correlation. Spiculated appearing scarring in the right breast may correlate with patient's known history of right breast malignancy. Small hiatal hernia.    CT chest WO 7/16: Large airspace consolidation with surrounding groundglass in the apicoposterior segment of the LEFT upper lobe. Trace left pleural effusion. This finding could represent pneumonia in the appropriate clinical setting. Additional differential includes pulmonary infarction (presence or absence of pulmonary embolism is not assessed on the current noncontrast exam. No acute osseous fracture.     CT C spine 7/16: No acute fracture. No AP plane subluxation. Reversal of normally visualized cervical lordosis. Multilevel degenerative changes    CT head WO 7/16: No CT evidence of acute intracranial hemorrhage. Atherosclerotic changes.     Cardiac Testing:  L heart cath 7/20: Non occlusive CAD    Tele 7/19: SR, rate 60s-80s    Tele 7/18: Episode of NSVT     Echo 7/17/23: Mitral Valve There is calcification of both mitral valve leaflets. The leaflet opening is normal. Mild (1+) mitral regurgitation is present. EA reversal of the mitral inflow consistent with reduced compliance of the left ventricle. Aortic Valve The aortic valve is well visualized, appears mildly calcified. Valve opening seems to be normal. Tricuspid Valve Normal appearing tricuspid valve structure and function. Trace tricuspid valve regurgitation is present. Pulmonic Valve Normal appearing pulmonic valve structure. Trace to Mild pulmonic valvular regurgitation is present. Left Atrium The left atrium is mildly dilated. Left Ventricle Estimated left ventricular ejection fraction is 45 %. The left ventricle is normal in size and wall thickness. Mild, diffuse hypokinesis of the left ventricle is present. Right Atrium Normal appearing right atrium. Right Ventricle Normal appearing right ventricle structure and function. Pericardial Effusion No evidence of pericardial effusion. Pleural Effusion No evidence of pleural effusion. Miscellaneous The IVC was not visualized.    Meds:  MEDICATIONS  (STANDING):  aspirin  chewable 81 milliGRAM(s) Oral daily  cefTRIAXone Injectable. 1000 milliGRAM(s) IV Push every 24 hours  letrozole 2.5 milliGRAM(s) Oral daily  metoprolol succinate ER 75 milliGRAM(s) Oral daily  spironolactone 12.5 milliGRAM(s) Oral daily  venlafaxine XR. 150 milliGRAM(s) Oral daily    MEDICATIONS  (PRN):  acetaminophen     Tablet .. 650 milliGRAM(s) Oral every 6 hours PRN Mild Pain (1 - 3)  aluminum hydroxide/magnesium hydroxide/simethicone Suspension 30 milliLiter(s) Oral every 4 hours PRN Dyspepsia  melatonin 3 milliGRAM(s) Oral at bedtime PRN Insomnia  ondansetron Injectable 4 milliGRAM(s) IV Push every 6 hours PRN Nausea and/or Vomiting

## 2023-07-20 NOTE — PROGRESS NOTE ADULT - SUBJECTIVE AND OBJECTIVE BOX
HPI:  68 year old female w AF s/p ablation, R breast CA 2017 on letrozole, depression, HTN, neuropathy R foot, and OA, BIBEMS s/p falls, found to have elevated troponin, peak a t 82583.47. Pt brought to cath lab for cardiac cath with possible PCI.    Pt underwent LHC via Rt femoral artery access, revealed non-obstructive CAD.     ROS: denies chest pain/ pressure, SOB or palpitation     Vital Signs;  T(C): 37 (07-19-23 @ 20:46), Max: 37 (07-19-23 @ 20:46)  HR: 73 (07-20-23 @ 09:25) (73 - 94)  BP: 135/58 (07-20-23 @ 09:25) (111/65 - 143/63)  RR: 17 (07-20-23 @ 09:25) (16 - 20)  SpO2: 93% (07-20-23 @ 09:25) (86% - 93%)    PHYSICAL EXAM:  GENERAL: NAD, well-groomed, well-developed  HEENT - NC/AT, pupils equal and reactive to light,  ; Moist mucous membranes, Good dentition, No lesions  NECK: Supple, No JVD  CHEST/LUNG: Clear to auscultation bilaterally; No rales, rhonchi, wheezing  HEART: Regular rate and rhythm; No murmurs, rubs, or gallops  ABDOMEN: Soft, Nontender, Nondistended; Bowel sounds present  EXTREMITIES:  2+ Peripheral Pulses, No clubbing, cyanosis, or edema  NEURO:  No Focal deficits, sensory and motor intact  SKIN: No rashes or lesions  Access site: Rt femoral artery (s/p Mynx): no hematoma or bleeding       LABS: All Labs Reviewed:                        12.3   4.67  )-----------( 240      ( 20 Jul 2023 06:42 )             35.8     07-20    137  |  108  |  19  ----------------------------<  115<H>  3.8   |  24  |  0.84    Ca    8.5      20 Jul 2023 06:42    TPro  6.0  /  Alb  2.2<L>  /  TBili  0.3  /  DBili  x   /  AST  234<H>  /  ALT  219<H>  /  AlkPhos  76  07-20      CARDIAC MARKERS ( 20 Jul 2023 06:42 )  x     / x     / 1593 U/L / x     / x      CARDIAC MARKERS ( 19 Jul 2023 07:02 )  x     / x     / 4321 U/L / x     / x        Cath report; pending official report     Medications:  acetaminophen     Tablet .. 650 milliGRAM(s) Oral every 6 hours PRN  aluminum hydroxide/magnesium hydroxide/simethicone Suspension 30 milliLiter(s) Oral every 4 hours PRN  aspirin  chewable 81 milliGRAM(s) Oral daily  cefTRIAXone Injectable. 1000 milliGRAM(s) IV Push every 24 hours  clopidogrel Tablet 75 milliGRAM(s) Oral daily  letrozole 2.5 milliGRAM(s) Oral daily  melatonin 3 milliGRAM(s) Oral at bedtime PRN  metoprolol succinate ER 75 milliGRAM(s) Oral daily  ondansetron Injectable 4 milliGRAM(s) IV Push every 6 hours PRN  sodium chloride 0.9% Bolus 250 milliLiter(s) IV Bolus once  sodium chloride 0.9%. 1000 milliLiter(s) IV Continuous <Continuous>  spironolactone 12.5 milliGRAM(s) Oral daily  venlafaxine XR. 150 milliGRAM(s) Oral daily    A/P:   68 year old female w AF s/p ablation, R breast CA 2017 on letrozole, depression, HTN, neuropathy R foot, and OA, BIBEMS s/p falls, found to have elevated troponin, peak a t 04341.47. Pt underwent LHC via Rt femoral artery access, revealed non-obstructive CAD.   - return to her unit   - IV hydration: NS at 200cc/hr x2 hrs   - continue ASA 81 mg daily   - discontinue Plavix   - continue BB  - continue rest of care by primary team   - post procedure, outcome and follow up care reviewed with patient and DR. Sexton   - Discussed therapeutic lifestyle modifications to reduce CAD risk factors including cardiac diet, weight control, exercise, smoking cessation, medication compliance and regular outpt follow-up.   - follow up with Dr. Patel    Discussed the plan with Dr. Sexton, Pt and Cath RN.

## 2023-07-20 NOTE — PROGRESS NOTE ADULT - ASSESSMENT
67 yo with hx of arrhythmias and breast ca on letrozole presents with weakness leading to falls leading to rhabdo.  Hyponatremia depletional   + trop    REC  - cw ivf and fu trend of cpk  - fu trend of Na with ivf   - + trop in setting of rhabdo, echo and tele monitoring     7/18 MK   - Rhabdo cpk improving     cw ivf, will dec rate   - resolved hyponatremia   - weakness with runs of svt: metoprolol inc    echo with ef 40%    started on spironolactone and for cath in am     7/19 SY  --Rhabdo : CPK continuing to trend down.  Continue IVF for another 24 hours with Cath today.  --Hyponatremia : resolved and stable.  --Cardiac /arrhythmia : for cath today.  --CHF : restarted Spironolactone. d/c IVF in am.    7/20 SY  --TEZ : resolved.  --Rhabdo : resolved and stable.  --Hyponatremia : resolved and stable.  --post Cardiac cath --non obstructive CAD : d/c IVF.  --CHF : clinically compensated : continue Spironolactone.  --PNA : continue abtx  --For bone scan for possible lytic lesion with hx of breast CA

## 2023-07-21 LAB
ALBUMIN SERPL ELPH-MCNC: 2.2 G/DL — LOW (ref 3.3–5)
ANION GAP SERPL CALC-SCNC: 6 MMOL/L — SIGNIFICANT CHANGE UP (ref 5–17)
BUN SERPL-MCNC: 20 MG/DL — SIGNIFICANT CHANGE UP (ref 7–23)
CALCIUM SERPL-MCNC: 8.4 MG/DL — LOW (ref 8.5–10.1)
CHLORIDE SERPL-SCNC: 111 MMOL/L — HIGH (ref 96–108)
CO2 SERPL-SCNC: 25 MMOL/L — SIGNIFICANT CHANGE UP (ref 22–31)
CREAT SERPL-MCNC: 0.78 MG/DL — SIGNIFICANT CHANGE UP (ref 0.5–1.3)
CULTURE RESULTS: SIGNIFICANT CHANGE UP
CULTURE RESULTS: SIGNIFICANT CHANGE UP
EGFR: 82 ML/MIN/1.73M2 — SIGNIFICANT CHANGE UP
GLUCOSE SERPL-MCNC: 122 MG/DL — HIGH (ref 70–99)
HCT VFR BLD CALC: 35.7 % — SIGNIFICANT CHANGE UP (ref 34.5–45)
HGB BLD-MCNC: 12.2 G/DL — SIGNIFICANT CHANGE UP (ref 11.5–15.5)
MCHC RBC-ENTMCNC: 30 PG — SIGNIFICANT CHANGE UP (ref 27–34)
MCHC RBC-ENTMCNC: 34.2 GM/DL — SIGNIFICANT CHANGE UP (ref 32–36)
MCV RBC AUTO: 87.7 FL — SIGNIFICANT CHANGE UP (ref 80–100)
PHOSPHATE SERPL-MCNC: 3.5 MG/DL — SIGNIFICANT CHANGE UP (ref 2.5–4.5)
PLATELET # BLD AUTO: 240 K/UL — SIGNIFICANT CHANGE UP (ref 150–400)
POTASSIUM SERPL-MCNC: 3.7 MMOL/L — SIGNIFICANT CHANGE UP (ref 3.5–5.3)
POTASSIUM SERPL-SCNC: 3.7 MMOL/L — SIGNIFICANT CHANGE UP (ref 3.5–5.3)
RBC # BLD: 4.07 M/UL — SIGNIFICANT CHANGE UP (ref 3.8–5.2)
RBC # FLD: 14.1 % — SIGNIFICANT CHANGE UP (ref 10.3–14.5)
SODIUM SERPL-SCNC: 142 MMOL/L — SIGNIFICANT CHANGE UP (ref 135–145)
SPECIMEN SOURCE: SIGNIFICANT CHANGE UP
SPECIMEN SOURCE: SIGNIFICANT CHANGE UP
WBC # BLD: 5.2 K/UL — SIGNIFICANT CHANGE UP (ref 3.8–10.5)
WBC # FLD AUTO: 5.2 K/UL — SIGNIFICANT CHANGE UP (ref 3.8–10.5)

## 2023-07-21 PROCEDURE — 99232 SBSQ HOSP IP/OBS MODERATE 35: CPT

## 2023-07-21 PROCEDURE — 71250 CT THORAX DX C-: CPT | Mod: 26

## 2023-07-21 PROCEDURE — 99233 SBSQ HOSP IP/OBS HIGH 50: CPT

## 2023-07-21 RX ORDER — SPIRONOLACTONE 25 MG/1
25 TABLET, FILM COATED ORAL DAILY
Refills: 0 | Status: DISCONTINUED | OUTPATIENT
Start: 2023-07-21 | End: 2023-07-26

## 2023-07-21 RX ORDER — FUROSEMIDE 40 MG
40 TABLET ORAL ONCE
Refills: 0 | Status: COMPLETED | OUTPATIENT
Start: 2023-07-21 | End: 2023-07-21

## 2023-07-21 RX ADMIN — SPIRONOLACTONE 25 MILLIGRAM(S): 25 TABLET, FILM COATED ORAL at 11:27

## 2023-07-21 RX ADMIN — LETROZOLE 2.5 MILLIGRAM(S): 2.5 TABLET, FILM COATED ORAL at 11:25

## 2023-07-21 RX ADMIN — Medication 150 MILLIGRAM(S): at 11:27

## 2023-07-21 RX ADMIN — Medication 3 MILLIGRAM(S): at 00:00

## 2023-07-21 RX ADMIN — Medication 40 MILLIGRAM(S): at 22:29

## 2023-07-21 RX ADMIN — Medication 75 MILLIGRAM(S): at 11:26

## 2023-07-21 RX ADMIN — CEFTRIAXONE 1000 MILLIGRAM(S): 500 INJECTION, POWDER, FOR SOLUTION INTRAMUSCULAR; INTRAVENOUS at 11:27

## 2023-07-21 RX ADMIN — Medication 81 MILLIGRAM(S): at 11:26

## 2023-07-21 NOTE — PROGRESS NOTE ADULT - ASSESSMENT
68 year old woman with hx of HTN, AF s/p ablation, R breast CA 2017 on letrozole, R foot neuropathy, osteoartritis, depression, here for further evaluation after a fall, trouble getting up.     Fall  Orthostatics negative. Appears to have been a mechanical fall. Seen by PT, recommended [facility]  - Continue restorative PT sessions  - OOB to chair daily    Rhabdomyolysis  CPK downtrending. Patient improved with IVF hydration  - Encourage hydration    Type II MI  Troponin elevated to 15,000 now downtrending. TTE done, diffuse hypokinesis. L hearth cath done today, no occlusive CAD.   - Continue medical management    Hepatic steatosis with mild transaminitis  LFTs gradually improving  - Continue to monitor as outpatient    Severe sepsis w lactic acidosis due to community acquired pneumonia, unable to rule out Gram-negative organism, present upon admission  Lactate improved. Blood cultures negative. Breathing comfortably. S.p azithromycin. On ceftriaxone day 4.  - Continue ceftriaxone, plan to complete 5 days tomorrow    HFrEF  Chronic, stable no sign of decompensation  - Continue metoprolol, spironolactone    Hx breast CA  Onc consult appreciated. Sclerotic focus on R iliac, likely benign but given breast cancer hx, patient is ordered for NM bone scan  - F/u bone scan     R ovarian cyst  As noted on imaging  - Outpatient follow up      Dispo: Approaching clinical stability for DC to [facility], possibly in 24-48 hrs       68 year old woman with hx of HTN, AF s/p ablation, R breast CA 2017 on letrozole, R foot neuropathy, osteoartritis, depression, here for further evaluation after a fall, trouble getting up.     Fall  Orthostatics negative. Appears to have been a mechanical fall. Seen by PT, recommended [facility]  - Continue restorative PT sessions  - OOB to chair daily    Rhabdomyolysis  CPK downtrending. Patient improved with IVF hydration  - Encourage Po hydration    Type II MI  Troponin elevated to 15,000 now downtrending. TTE done, diffuse hypokinesis. L hearth cath done today, no occlusive CAD.   - Continue medical management    Hepatic steatosis with mild transaminitis  LFTs gradually improving  - Continue to monitor as outpatient    Acute respiratory failure with hypoxia  Etiology unclear. CXR 7/19. L upper lobe airspace consolidation. Had CXR with same earlier this admission. Treated with ceftriaxone and azithromycin and completed course. Still with hypoxia. Suspect that this could also be pulm congestion, fluid related.   - Obtain CT chest to assess for interval change.  - Oxygen supplementation  - Trial of IV Lasix x 1    Severe sepsis w lactic acidosis due to community acquired pneumonia, unable to rule out Gram-negative organism, present upon admission  Lactate improved. Blood cultures negative. Breathing comfortably but requiring O2 supplementation, possible due to volume overload at this point, rather than pneumonia. S/p azithromycin x 3d. S/p ceftriaxone x 5d.  - Continue to monitor    Acute on chronic systolic CHF  Upon admission she was without signs of decompensation, stable. This admission she received aggressive IV fluid hydration for rhabdomyolysis and now she has hypoxia, presumed fluid overload.   - Continue metoprolol, spironolactone    Hx breast CA  Onc consult appreciated. Sclerotic focus on R iliac, likely benign but given breast cancer hx, patient is ordered for NM bone scan  - F/u bone scan     R ovarian cyst  As noted on imaging  - Outpatient follow up      Dispo: Anticipate DC to [facility] once clinically improved and inpatient workup complete, Mon 7/24

## 2023-07-21 NOTE — PROGRESS NOTE ADULT - SUBJECTIVE AND OBJECTIVE BOX
NEPHROLOGY INTERVAL HPI/OVERNIGHT EVENTS:    Date of Service: 07-20-23 @ 12:36  7/21- feels well, no new events  7/20--Post Cardiac cath.--non obstructive CAD.  Continues to feel weak and unable to participate with PT.  7/19--For Cardiac cath later in the day. Feeling well.  No SOB.  7/18 feels better able to tolerate po, too weak to get out of bed, tele withruns of svt, metoprolol inc   HPI:  68 year old female w AF s/p ablation, R breast CA 2017 on letrozole, depression, HTN, neuropathy R foot, and OA, BIBEMS s/p falls      Thursday 07-13 she fell outside in her yard as she was out w her 2 dogs; fell backwards; no head injury or LOC  on the ground for 2 hrs before she was able to get up; scooted to her door  Yesterday was on couch when she rolled on to floor; scooted on her knees and arms to toilet but could not get up   was on the floor until her sister came;  finally pt called the ambulance    when EMS arrived, tried helping pt ambulate but was unable to walk.    pt reports increasing fatigue over past few days  +occasional cough  Wednesday 07-12 her friend visited; no known sick contacts but friend has  grandchildren      In ED /50   HR 92   RR 17   T 100.4   94% RA     .  CT head no acute changes  CT Cspine DJD w narrowing  CT C/A/P  +KARELY PNA, +HM w steatosis, +sclerotic focus R iliac bone  R knee w DJD  cefepime  vanco  ----------------------------------------------------------------------------  NEPHROLOGY CONSULT   above hx corroborated with pt   denies  any diuretics use, no sick contacts  no f/c/ no n/v/d  po intake adequate  feels better with ivf  + trop with tele with no events so far, for echo       MEDICATIONS  (STANDING):  aspirin  chewable 81 milliGRAM(s) Oral daily  letrozole 2.5 milliGRAM(s) Oral daily  metoprolol succinate ER 75 milliGRAM(s) Oral daily  sodium chloride 0.9% Bolus 250 milliLiter(s) IV Bolus once  sodium chloride 0.9%. 1000 milliLiter(s) (200 mL/Hr) IV Continuous <Continuous>  spironolactone 25 milliGRAM(s) Oral daily  venlafaxine XR. 150 milliGRAM(s) Oral daily    MEDICATIONS  (PRN):  acetaminophen     Tablet .. 650 milliGRAM(s) Oral every 6 hours PRN Mild Pain (1 - 3)  aluminum hydroxide/magnesium hydroxide/simethicone Suspension 30 milliLiter(s) Oral every 4 hours PRN Dyspepsia  melatonin 3 milliGRAM(s) Oral at bedtime PRN Insomnia  ondansetron Injectable 4 milliGRAM(s) IV Push every 6 hours PRN Nausea and/or Vomiting      Vital Signs Last 24 Hrs  T(C): 36.5 (21 Jul 2023 08:10), Max: 36.8 (20 Jul 2023 20:39)  T(F): 97.7 (21 Jul 2023 08:10), Max: 98.3 (20 Jul 2023 20:39)  HR: 80 (21 Jul 2023 08:10) (80 - 95)  BP: 116/52 (21 Jul 2023 08:10) (110/72 - 116/52)  BP(mean): --  RR: 19 (21 Jul 2023 08:10) (19 - 19)  SpO2: 93% (21 Jul 2023 08:10) (93% - 93%)    Parameters below as of 21 Jul 2023 08:10  Patient On (Oxygen Delivery Method): nasal cannula  O2 Flow (L/min): 4      PHYSICAL EXAM:  GENERAL: No distress  CHEST/LUNG: Fair air entry  HEART: S1S2 RRR  ABDOMEN: soft  EXTREMITIES: no edema  SKIN:     LABS:                          12.2   5.20  )-----------( 240      ( 21 Jul 2023 07:29 )             35.7                           12.3   4.67  )-----------( 240      ( 20 Jul 2023 06:42 )             35.8     07-21    142  |  111<H>  |  20  ----------------------------<  122<H>  3.7   |  25  |  0.78    Ca    8.4<L>      21 Jul 2023 07:29  Phos  3.5     07-21    TPro  x   /  Alb  2.2<L>  /  TBili  x   /  DBili  x   /  AST  x   /  ALT  x   /  AlkPhos  x   07-21 07-20    137  |  108  |  19  ----------------------------<  115<H>  3.8   |  24  |  0.84    Ca    8.5      20 Jul 2023 06:42    TPro  6.0  /  Alb  2.2<L>  /  TBili  0.3  /  DBili  x   /  AST  234<H>  /  ALT  219<H>  /  AlkPhos  76  07-20      Urinalysis Basic - ( 20 Jul 2023 06:42 )    Color: x / Appearance: x / SG: x / pH: x  Gluc: 115 mg/dL / Ketone: x  / Bili: x / Urobili: x   Blood: x / Protein: x / Nitrite: x   Leuk Esterase: x / RBC: x / WBC x   Sq Epi: x / Non Sq Epi: x / Bacteria: x              RADIOLOGY & ADDITIONAL TESTS:

## 2023-07-21 NOTE — PROGRESS NOTE ADULT - ASSESSMENT
67 yo with hx of arrhythmias and breast ca on letrozole presents with weakness leading to falls leading to rhabdo.  Hyponatremia depletional   + trop    REC  - cw ivf and fu trend of cpk  - fu trend of Na with ivf   - + trop in setting of rhabdo, echo and tele monitoring     7/18 MK   - Rhabdo cpk improving     cw ivf, will dec rate   - resolved hyponatremia   - weakness with runs of svt: metoprolol inc    echo with ef 40%    started on spironolactone and for cath in am     7/19 SY  --Rhabdo : CPK continuing to trend down.  Continue IVF for another 24 hours with Cath today.  --Hyponatremia : resolved and stable.  --Cardiac /arrhythmia : for cath today.  --CHF : restarted Spironolactone. d/c IVF in am.    7/20 SY  --TEZ : resolved.  --Rhabdo : resolved and stable.  --Hyponatremia : resolved and stable.  --post Cardiac cath --non obstructive CAD : d/c IVF.  --CHF : clinically compensated : continue Spironolactone.  --PNA : continue abtx  --For bone scan for possible lytic lesion with hx of breast CA    7/21  Rhabdo resolved  Hyponatremia resolved  Creat improved, s/w Cardiac cath  Will sign off   Please reconsult PRN  Dr LAY Diaz covering this weekend

## 2023-07-21 NOTE — PROGRESS NOTE ADULT - SUBJECTIVE AND OBJECTIVE BOX
CHIEF COMPLAINT: Patient is a 68y old  Female who presents with a chief complaint of Fall  Rhabdomyolysis  Elevated troponin (20 Jul 2023 18:21)      HPI:  68 year old female w AF s/p ablation, R breast CA 2017 on letrozole, depression, HTN, neuropathy R foot, and OA, BIBEMS s/p falls      Thursday 07-13 she fell outside in her yard as she was out w her 2 dogs; fell backwards; no head injury or LOC  on the ground for 2 hrs before she was able to get up; scooted to her door  Yesterday was on couch when she rolled on to floor; scooted on her knees and arms to toilet but could not get up   was on the floor until her sister came;  finally pt called the ambulance    when EMS arrived, tried helping pt ambulate but was unable to walk.    pt reports increasing fatigue over past few days  +occasional cough  Wednesday 07-12 her friend visited; no known sick contacts but friend has  grandchildren        In ED /50   HR 92   RR 17   T 100.4   94% RA     .  CT head no acute changes  CT Cspine DJD w narrowing  CT C/A/P  +KARELY PNA, +HM w steatosis, +sclerotic focus R iliac bone  R knee w DJD  cefepime  vanco    7/17-patient well known to me; has above medical history; did have multiple ECHO with normal LV function but no ischemic w/u in the past.   Very elevated troponin levels with in face of rhabdomyolysis with no chest pains ano EKG changes.       7/18-patient with ECHO done l denying chest pains, but with c/o palpitations.   Patient febrile to 100.5-receiving ceftriaxone for suspected pneumonia.  Telemetry monitor showing runs of SVT, (no afib).  ECHO with EF 40% and inferior wall hypokinesis    7/19-patient tolerating medications; now off Eliquis for 48 hours.   On aSA/plavix and beta blockers; spironolactone added for CHF treatment.  ECHO with EF 40% with inferior hypokinesis.  Being treated for pneumonia.   LFTs and CPKs still elevated.  D/w cath team to get patient on schedule for cath.    7/20-s/p cardiac cath-non-obstructive disease    7/21-patient with HFrEF with non obstructive CAD; patient with elevated troponins found in the face of rhabdomyolysis.        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  OA osteoarthritis of R knee   Venous stasis ulcer of right ankle limited to breakdown of skin with varicose veins      PAST SURGICAL HX	  Catheter Ablation 2016  Breast Surgery Lumpectomy 07/2003 and 05/2017  [place] lymph node biopsy  Breast biopsy  Complete colonoscopy 2017      FAMILY HX  CVA : Mother  COPD : Father  Coronary Art Dis : Father  Mantle cell Lymphoma : Sister  Pancreatic cancer : Grandfather  Prostate cancer : Cousin  Esophageal Cancer : Paternal Uncle        SOCIAL HX  never smoker  no alcohol  no drugs    lives alone w her 2 dogs : Real and a Pomeranian (16 Jul 2023 22:06)      PMHx: PAST MEDICAL & SURGICAL HISTORY:  Breast lump  benign      Malignant neoplasm of right female breast, unspecified site of breast      Breast pain, right      Breast skin changes  right breast      Essential hypertension      SVT (supraventricular tachycardia)      Microscopic hematuria      Osteoarthritis  knees      Rosacea      H/O cardiac radiofrequency ablation      History of lumpectomy of right breast  benign -2003      H/O right breast biopsy  2017      S/P colonoscopy          Allergies: Allergies    red dye (Other)  Cipro (Other)    Intolerances    spinach (Vomiting)        REVIEW OF SYSTEMS:    CONSTITUTIONAL: No weakness, fevers or chills  EYES/ENT: No visual changes;  No vertigo or throat pain   NECK: No pain or stiffness  RESPIRATORY: No cough, wheezing, hemoptysis; No shortness of breath  CARDIOVASCULAR: No chest pain or palpitations  GASTROINTESTINAL: No abdominal or epigastric pain. No nausea, vomiting, or hematemesis; No diarrhea or constipation. No melena or hematochezia.  GENITOURINARY: No dysuria, frequency or hematuria  NEUROLOGICAL: No numbness or weakness  SKIN: No itching, burning, rashes, or lesions   All other review of systems is negative unless indicated above    Vital Signs Last 24 Hrs  T(C): 36.5 (21 Jul 2023 08:10), Max: 36.8 (20 Jul 2023 20:39)  T(F): 97.7 (21 Jul 2023 08:10), Max: 98.3 (20 Jul 2023 20:39)  HR: 80 (21 Jul 2023 08:10) (73 - 95)  BP: 116/52 (21 Jul 2023 08:10) (110/72 - 143/63)  BP(mean): --  RR: 19 (21 Jul 2023 08:10) (16 - 19)  SpO2: 93% (21 Jul 2023 08:10) (92% - 94%)    Parameters below as of 21 Jul 2023 08:10  Patient On (Oxygen Delivery Method): nasal cannula  O2 Flow (L/min): 4      I&O's Summary          PHYSICAL EXAM:   Constitutional: NAD, awake and alert, well-developed  HEENT: PERR, EOMI, Normal Hearing, MMM  Neck: Soft and supple, JVD  Respiratory: Breath sounds rales or rhonchi  Cardiovascular: S1 and S2, regular rate and rhythm, Murmurs, gallops or rubs  Gastrointestinal: Bowel Sounds present, soft, nontender, nondistended, no guarding, no rebound  Extremities: peripheral edema      MEDICATIONS:  MEDICATIONS  (STANDING):  aspirin  chewable 81 milliGRAM(s) Oral daily  cefTRIAXone Injectable. 1000 milliGRAM(s) IV Push every 24 hours  letrozole 2.5 milliGRAM(s) Oral daily  metoprolol succinate ER 75 milliGRAM(s) Oral daily  sodium chloride 0.9% Bolus 250 milliLiter(s) IV Bolus once  sodium chloride 0.9%. 1000 milliLiter(s) (200 mL/Hr) IV Continuous <Continuous>  spironolactone 12.5 milliGRAM(s) Oral daily  venlafaxine XR. 150 milliGRAM(s) Oral daily      LABS: All Labs Reviewed:                        12.2   5.20  )-----------( 240      ( 21 Jul 2023 07:29 )             35.7     07-20    137  |  108  |  19  ----------------------------<  115<H>  3.8   |  24  |  0.84    Ca    8.5      20 Jul 2023 06:42    TPro  6.0  /  Alb  2.2<L>  /  TBili  0.3  /  DBili  x   /  AST  234<H>  /  ALT  219<H>  /  AlkPhos  76  07-20      CARDIAC MARKERS ( 20 Jul 2023 06:42 )  x     / x     / 1593 U/L / x     / x          Blood Culture: Organism --  Gram Stain Blood -- Gram Stain --  Specimen Source Clean Catch None  Culture-Blood --        lipid profile          07-17 @ 05:49  cholesterol 112 mg/dL  direct LDL --  HDL 29 mg/dL  HDL/total cholesterol --  Triglyceride, serum 129 mg/dL    BNP     RADIOLOGY:    EKG:      Telemetry:    ECHO:

## 2023-07-21 NOTE — PROGRESS NOTE ADULT - ASSESSMENT
68 year old F hx of HER 2 stage IIA breast cancer, s/p lumpectomy, RT, AC x 4, taxol/herceptin and herceptin  x 12 months presents after fall    1) Fall  -orthostatics neg  -CPK downtrending  -continue IVF  -renal and physical therapy following    2) Elevated troponin  cardiac cath non obstructive cad  troponin sec to rhabdomyolysis       3) Abnormal imaging  There is a 5 mm ill-defined sclerotic focus in the right iliac bone   (image 126, series 2). Although this finding is probably benign, given   history of breast cancer, metastatic disease is not reliably excluded.   Consider bone scan for further evaluation.    -Agree with bone scan to better evaluate- will order however pt is too lethargic, can be done next week if still in hospital. if not we can do out patient PET  -After dc PET scan may be appropriate   -Will continue to follow      Kendall Estevez MD  [facility]  Cell: [phone]

## 2023-07-21 NOTE — PROGRESS NOTE ADULT - SUBJECTIVE AND OBJECTIVE BOX
Chief Complaint: Fall    Interval Hx: No acute complaints. Feels overall stable. No dizziness or lightheadedness. No palpitations. No chest pain or tightness. No dyspnea. No abdominal symptoms. No urinary complaints.     ROS: Multi system review is comprehensively negative x 10 systems     Vitals:  T(F): 98.1 (20 Jul 2023 12:40), Max: 98.6 (19 Jul 2023 20:46)  HR: 81 (20 Jul 2023 12:40) (73 - 94)  BP: 119/65 (20 Jul 2023 12:40) (111/65 - 143/63)  RR: 18 (20 Jul 2023 12:40) (16 - 20)  SpO2: 94% (20 Jul 2023 12:40) (86% - 94%) on 2L /min O2     Exam:  GEN: Comfortable appearing  HEENT: NCAT  pupils equal and reactive, EOMI, no oropharyngeal lesions, erythema, exudates, oral thrush  NECK: Supple, no carotid bruits, no palpable lymph nodes, no thyromegaly  CVS:  +S1, +S2, regular, no murmurs or rubs  RESP: Lungs clear to auscultation bilaterally, no wheezing, rales, rhonchi, good air entry bilaterally  GI: Abdomen soft, non-tender, non-distended, normal BS, no bruits, no abdominal masses, no palpable masses  EXT: Normal muscle tone, no atrophy, no rigidity, no contractions, no clubbing, no cyanosis, no edema, no calf pain, swelling or erythema, pulses equal and symmetric in the upper and lower extremities  SKIN:  no ulcers, lesions or rashes  NEURO:  AAOX3, no focal neurological deficits, follows all commands, able to move extremities spontaneously    LABS:                                 12.3   4.67  )-----------( 240                  35.8         137  |  108  |  19  ---------------------<  115  3.8   |  24  |  0.84    Ca 8.5          TPro  6.0  /  Alb  2.2  /  TBili  0.3  /  DBili  x   /  AST  234  /  ALT  219  /  AlkPhos  76      Troponin 15,000 --> 8750    Blood cultures negative    Imaging:  CT R knee noncon 7/17/23:  No acute fracture or dislocation. Osteoarthritis.    XR B/L knees 7/16/23: Corticated bone fragment adjacent to the upper R femoral lateral condyle concerning for avulsed fracture versus exostosis.    CT A/P WO 7/16/23: There is a 5 mm ill-defined sclerotic focus in the right iliac bone. Although this finding is probably benign, given history of breast cancer, metastatic disease is not reliably excluded. Consider bone scan for further evaluation. Hepatomegaly. Diffuse hepatic steatosis. 2.2 cm probable right ovarian cyst, amenable to sonographic correlation. Spiculated appearing scarring in the right breast may correlate with patient's known history of right breast malignancy. Small hiatal hernia.    CT chest WO 7/16: Large airspace consolidation with surrounding groundglass in the apicoposterior segment of the LEFT upper lobe. Trace left pleural effusion. This finding could represent pneumonia in the appropriate clinical setting. Additional differential includes pulmonary infarction (presence or absence of pulmonary embolism is not assessed on the current noncontrast exam. No acute osseous fracture.     CT C spine 7/16: No acute fracture. No AP plane subluxation. Reversal of normally visualized cervical lordosis. Multilevel degenerative changes    CT head WO 7/16: No CT evidence of acute intracranial hemorrhage. Atherosclerotic changes.     Cardiac Testing:  L heart cath 7/20: Non occlusive CAD    Tele 7/19: SR, rate 60s-80s    Tele 7/18: Episode of NSVT     Echo 7/17/23: Mitral Valve There is calcification of both mitral valve leaflets. The leaflet opening is normal. Mild (1+) mitral regurgitation is present. EA reversal of the mitral inflow consistent with reduced compliance of the left ventricle. Aortic Valve The aortic valve is well visualized, appears mildly calcified. Valve opening seems to be normal. Tricuspid Valve Normal appearing tricuspid valve structure and function. Trace tricuspid valve regurgitation is present. Pulmonic Valve Normal appearing pulmonic valve structure. Trace to Mild pulmonic valvular regurgitation is present. Left Atrium The left atrium is mildly dilated. Left Ventricle Estimated left ventricular ejection fraction is 45 %. The left ventricle is normal in size and wall thickness. Mild, diffuse hypokinesis of the left ventricle is present. Right Atrium Normal appearing right atrium. Right Ventricle Normal appearing right ventricle structure and function. Pericardial Effusion No evidence of pericardial effusion. Pleural Effusion No evidence of pleural effusion. Miscellaneous The IVC was not visualized.    Meds:  MEDICATIONS  (STANDING):  aspirin  chewable 81 milliGRAM(s) Oral daily  cefTRIAXone Injectable. 1000 milliGRAM(s) IV Push every 24 hours  letrozole 2.5 milliGRAM(s) Oral daily  metoprolol succinate ER 75 milliGRAM(s) Oral daily  spironolactone 12.5 milliGRAM(s) Oral daily  venlafaxine XR. 150 milliGRAM(s) Oral daily    MEDICATIONS  (PRN):  acetaminophen     Tablet .. 650 milliGRAM(s) Oral every 6 hours PRN Mild Pain (1 - 3)  aluminum hydroxide/magnesium hydroxide/simethicone Suspension 30 milliLiter(s) Oral every 4 hours PRN Dyspepsia  melatonin 3 milliGRAM(s) Oral at bedtime PRN Insomnia  ondansetron Injectable 4 milliGRAM(s) IV Push every 6 hours PRN Nausea and/or Vomiting     Chief Complaint: Fall    Interval Hx: No acute complaints. Feels overall stable. No dizziness or lightheadedness. No palpitations. No chest pain or tightness. No dyspnea. No abdominal symptoms. No urinary complaints. Still generally weak and deconditioned. Also, remains on oxygen supplementation, new since admission, thought to be due to iatrogenic fluid overload in setting of treating her rhabdomyolysis.     ROS: Multi system review is comprehensively negative x 10 systems     Vitals:  T(F): 97.7 (21 Jul 2023 08:10), Max: 98.3 (20 Jul 2023 20:39)  HR: 80 (21 Jul 2023 08:10) (80 - 95)  BP: 116/52 (21 Jul 2023 08:10) (110/72 - 116/52)  RR: 19 (21 Jul 2023 08:10) (19 - 19)  SpO2: 93% (21 Jul 2023 08:10) (93% - 93%) on 4L/min via NC    Exam:  GEN: Comfortable appearing  HEENT: NCAT  pupils equal and reactive, EOMI, no oropharyngeal lesions, erythema, exudates, oral thrush  NECK: Supple, no carotid bruits, no palpable lymph nodes, no thyromegaly  CVS:  +S1, +S2, regular, no murmurs or rubs  RESP: Lungs clear to auscultation bilaterally, no wheezing, rales, rhonchi, good air entry bilaterally  GI: Abdomen soft, non-tender, non-distended, normal BS, no bruits, no abdominal masses, no palpable masses  EXT: Normal muscle tone, no atrophy, no rigidity, no contractions, no clubbing, no cyanosis, no edema, no calf pain, swelling or erythema, pulses equal and symmetric in the upper and lower extremities  SKIN:  no ulcers, lesions or rashes  NEURO:  AAOX3, no focal neurological deficits, follows all commands, able to move extremities spontaneously    Labs:                                          12.2   5.20  )---------( 240                   35.7       142  |  111  |  20  ---------------------<  122  3.7   |  25  |  0.78    Ca  8.4  Phos  3.5         TPro  6.0  /  Alb  2.2  /  TBili  0.3  /  DBili  x   /  AST  234  /  ALT  219  /  AlkPhos  76      Troponin 15,000 --> 8750    Blood cultures negative    Imaging:  CT R knee non con 7/17/23:  No acute fracture or dislocation. Osteoarthritis.    XR B/L knees 7/16/23: Corticated bone fragment adjacent to the upper R femoral lateral condyle concerning for avulsed fracture versus exostosis.    CT A/P WO 7/16/23: There is a 5 mm ill-defined sclerotic focus in the right iliac bone. Although this finding is probably benign, given history of breast cancer, metastatic disease is not reliably excluded. Consider bone scan for further evaluation. Hepatomegaly. Diffuse hepatic steatosis. 2.2 cm probable right ovarian cyst, amenable to sonographic correlation. Spiculated appearing scarring in the right breast may correlate with patient's known history of right breast malignancy. Small hiatal hernia.    CT chest WO 7/16: Large airspace consolidation with surrounding ground glass in the apicoposterior segment of the L upper lobe. Trace L pleural effusion. This finding could represent pneumonia in the appropriate clinical setting. Additional differential includes pulmonary infarction (presence or absence of pulmonary embolism is not assessed on the current noncontrast exam. No acute osseous fracture.     CT C spine 7/16: No acute fracture. No AP plane subluxation. Reversal of normally visualized cervical lordosis. Multilevel degenerative changes    CT head WO 7/16: No CT evidence of acute intracranial hemorrhage. Atherosclerotic changes.     Cardiac Testing:  L heart cath 7/20: Non occlusive CAD    Tele 7/19: SR, rate 60s-80s    Tele 7/18: Episode of NSVT     Echo 7/17/23: Mitral Valve There is calcification of both mitral valve leaflets. The leaflet opening is normal. Mild (1+) mitral regurgitation is present. EA reversal of the mitral inflow consistent with reduced compliance of the left ventricle. Aortic Valve The aortic valve is well visualized, appears mildly calcified. Valve opening seems to be normal. Tricuspid Valve Normal appearing tricuspid valve structure and function. Trace tricuspid valve regurgitation is present. Pulmonic Valve Normal appearing pulmonic valve structure. Trace to Mild pulmonic valvular regurgitation is present. Left Atrium The left atrium is mildly dilated. Left Ventricle Estimated left ventricular ejection fraction is 45 %. The left ventricle is normal in size and wall thickness. Mild, diffuse hypokinesis of the left ventricle is present. Right Atrium Normal appearing right atrium. Right Ventricle Normal appearing right ventricle structure and function. Pericardial Effusion No evidence of pericardial effusion. Pleural Effusion No evidence of pleural effusion. Miscellaneous The IVC was not visualized.    Meds:  MEDICATIONS  (STANDING):  aspirin  chewable 81 milliGRAM(s) Oral daily  letrozole 2.5 milliGRAM(s) Oral daily  metoprolol succinate ER 75 milliGRAM(s) Oral daily  spironolactone 25 milliGRAM(s) Oral daily  venlafaxine XR. 150 milliGRAM(s) Oral daily    MEDICATIONS  (PRN):  acetaminophen     Tablet .. 650 milliGRAM(s) Oral every 6 hours PRN Mild Pain (1 - 3)  aluminum hydroxide/magnesium hydroxide/simethicone Suspension 30 milliLiter(s) Oral every 4 hours PRN Dyspepsia  melatonin 3 milliGRAM(s) Oral at bedtime PRN Insomnia  ondansetron Injectable 4 milliGRAM(s) IV Push every 6 hours PRN Nausea and/or Vomiting

## 2023-07-21 NOTE — PROGRESS NOTE ADULT - ASSESSMENT
patient HFrEF-non ischemic  1-CHF-on spironolactone-increase from 12.5 to 25nmg daily; If BP allows, consider low dose entresto; will consider farxiga as outpatient  2-CAD-no active CAD  will follow

## 2023-07-21 NOTE — PROGRESS NOTE ADULT - SUBJECTIVE AND OBJECTIVE BOX
Hematology/Oncology    Pt seen, comfortable but lethargic and did not want bone scan      MEDICATIONS  (STANDING):  aspirin  chewable 81 milliGRAM(s) Oral daily  letrozole 2.5 milliGRAM(s) Oral daily  metoprolol succinate ER 75 milliGRAM(s) Oral daily  sodium chloride 0.9% Bolus 250 milliLiter(s) IV Bolus once  sodium chloride 0.9%. 1000 milliLiter(s) (200 mL/Hr) IV Continuous <Continuous>  spironolactone 25 milliGRAM(s) Oral daily  venlafaxine XR. 150 milliGRAM(s) Oral daily    MEDICATIONS  (PRN):  acetaminophen     Tablet .. 650 milliGRAM(s) Oral every 6 hours PRN Mild Pain (1 - 3)  aluminum hydroxide/magnesium hydroxide/simethicone Suspension 30 milliLiter(s) Oral every 4 hours PRN Dyspepsia  melatonin 3 milliGRAM(s) Oral at bedtime PRN Insomnia  ondansetron Injectable 4 milliGRAM(s) IV Push every 6 hours PRN Nausea and/or Vomiting      ROS  No fever, sweats, chills  No epistaxis, HA, sore throat  No CP, SOB, cough, sputum  No n/v/d, abd pain, melena, hematochezia  No edema  No rash  No anxiety  No back pain, joint pain  No bleeding, bruising  No dysuria, hematuria    Vital Signs Last 24 Hrs  T(C): 36.5 (21 Jul 2023 08:10), Max: 36.8 (20 Jul 2023 20:39)  T(F): 97.7 (21 Jul 2023 08:10), Max: 98.3 (20 Jul 2023 20:39)  HR: 80 (21 Jul 2023 08:10) (80 - 95)  BP: 116/52 (21 Jul 2023 08:10) (110/72 - 116/52)  BP(mean): --  RR: 19 (21 Jul 2023 08:10) (19 - 19)  SpO2: 93% (21 Jul 2023 08:10) (93% - 93%)    Parameters below as of 21 Jul 2023 08:10  Patient On (Oxygen Delivery Method): nasal cannula  O2 Flow (L/min): 4      PE  NAD  Awake, alert  Anicteric, MMM  RRR  CTAB  Abd soft, NT, ND  No c/c/e  No rash grossly  FROM                          12.2   5.20  )-----------( 240      ( 21 Jul 2023 07:29 )             35.7       07-21    142  |  111<H>  |  20  ----------------------------<  122<H>  3.7   |  25  |  0.78    Ca    8.4<L>      21 Jul 2023 07:29  Phos  3.5     07-21    TPro  x   /  Alb  2.2<L>  /  TBili  x   /  DBili  x   /  AST  x   /  ALT  x   /  AlkPhos  x   07-21

## 2023-07-22 LAB
ALBUMIN SERPL ELPH-MCNC: 2.6 G/DL — LOW (ref 3.3–5)
ALP SERPL-CCNC: 88 U/L — SIGNIFICANT CHANGE UP (ref 40–120)
ALT FLD-CCNC: 151 U/L — HIGH (ref 12–78)
ANION GAP SERPL CALC-SCNC: 7 MMOL/L — SIGNIFICANT CHANGE UP (ref 5–17)
AST SERPL-CCNC: 97 U/L — HIGH (ref 15–37)
BILIRUB SERPL-MCNC: 0.7 MG/DL — SIGNIFICANT CHANGE UP (ref 0.2–1.2)
BUN SERPL-MCNC: 23 MG/DL — SIGNIFICANT CHANGE UP (ref 7–23)
CALCIUM SERPL-MCNC: 8.2 MG/DL — LOW (ref 8.5–10.1)
CHLORIDE SERPL-SCNC: 106 MMOL/L — SIGNIFICANT CHANGE UP (ref 96–108)
CO2 SERPL-SCNC: 25 MMOL/L — SIGNIFICANT CHANGE UP (ref 22–31)
CREAT SERPL-MCNC: 1.08 MG/DL — SIGNIFICANT CHANGE UP (ref 0.5–1.3)
EGFR: 55 ML/MIN/1.73M2 — LOW
GLUCOSE SERPL-MCNC: 145 MG/DL — HIGH (ref 70–99)
MAGNESIUM SERPL-MCNC: 2 MG/DL — SIGNIFICANT CHANGE UP (ref 1.6–2.6)
NT-PROBNP SERPL-SCNC: 7752 PG/ML — HIGH (ref 0–125)
PHOSPHATE SERPL-MCNC: 3.2 MG/DL — SIGNIFICANT CHANGE UP (ref 2.5–4.5)
POTASSIUM SERPL-MCNC: 4.2 MMOL/L — SIGNIFICANT CHANGE UP (ref 3.5–5.3)
POTASSIUM SERPL-SCNC: 4.2 MMOL/L — SIGNIFICANT CHANGE UP (ref 3.5–5.3)
PROT SERPL-MCNC: 6.6 GM/DL — SIGNIFICANT CHANGE UP (ref 6–8.3)
SODIUM SERPL-SCNC: 138 MMOL/L — SIGNIFICANT CHANGE UP (ref 135–145)
TROPONIN I, HIGH SENSITIVITY RESULT: 412.5 NG/L — HIGH

## 2023-07-22 PROCEDURE — 99233 SBSQ HOSP IP/OBS HIGH 50: CPT

## 2023-07-22 PROCEDURE — 71275 CT ANGIOGRAPHY CHEST: CPT | Mod: 26

## 2023-07-22 RX ORDER — ATORVASTATIN CALCIUM 80 MG/1
40 TABLET, FILM COATED ORAL AT BEDTIME
Refills: 0 | Status: DISCONTINUED | OUTPATIENT
Start: 2023-07-22 | End: 2023-07-26

## 2023-07-22 RX ORDER — ENOXAPARIN SODIUM 100 MG/ML
60 INJECTION SUBCUTANEOUS ONCE
Refills: 0 | Status: COMPLETED | OUTPATIENT
Start: 2023-07-22 | End: 2023-07-22

## 2023-07-22 RX ORDER — SODIUM CHLORIDE 9 MG/ML
1000 INJECTION INTRAMUSCULAR; INTRAVENOUS; SUBCUTANEOUS ONCE
Refills: 0 | Status: COMPLETED | OUTPATIENT
Start: 2023-07-22 | End: 2023-07-22

## 2023-07-22 RX ORDER — ENOXAPARIN SODIUM 100 MG/ML
40 INJECTION SUBCUTANEOUS EVERY 24 HOURS
Refills: 0 | Status: DISCONTINUED | OUTPATIENT
Start: 2023-07-22 | End: 2023-07-22

## 2023-07-22 RX ORDER — SACUBITRIL AND VALSARTAN 24; 26 MG/1; MG/1
1 TABLET, FILM COATED ORAL
Refills: 0 | Status: DISCONTINUED | OUTPATIENT
Start: 2023-07-22 | End: 2023-07-26

## 2023-07-22 RX ORDER — ENOXAPARIN SODIUM 100 MG/ML
100 INJECTION SUBCUTANEOUS EVERY 12 HOURS
Refills: 0 | Status: DISCONTINUED | OUTPATIENT
Start: 2023-07-23 | End: 2023-07-23

## 2023-07-22 RX ADMIN — Medication 75 MILLIGRAM(S): at 10:34

## 2023-07-22 RX ADMIN — SODIUM CHLORIDE 1000 MILLILITER(S): 9 INJECTION INTRAMUSCULAR; INTRAVENOUS; SUBCUTANEOUS at 15:00

## 2023-07-22 RX ADMIN — ONDANSETRON 4 MILLIGRAM(S): 8 TABLET, FILM COATED ORAL at 19:35

## 2023-07-22 RX ADMIN — LETROZOLE 2.5 MILLIGRAM(S): 2.5 TABLET, FILM COATED ORAL at 10:35

## 2023-07-22 RX ADMIN — SACUBITRIL AND VALSARTAN 1 TABLET(S): 24; 26 TABLET, FILM COATED ORAL at 10:35

## 2023-07-22 RX ADMIN — Medication 81 MILLIGRAM(S): at 10:33

## 2023-07-22 RX ADMIN — ENOXAPARIN SODIUM 60 MILLIGRAM(S): 100 INJECTION SUBCUTANEOUS at 17:15

## 2023-07-22 RX ADMIN — Medication 150 MILLIGRAM(S): at 10:35

## 2023-07-22 RX ADMIN — ENOXAPARIN SODIUM 40 MILLIGRAM(S): 100 INJECTION SUBCUTANEOUS at 12:00

## 2023-07-22 RX ADMIN — SPIRONOLACTONE 25 MILLIGRAM(S): 25 TABLET, FILM COATED ORAL at 10:35

## 2023-07-22 NOTE — CONSULT NOTE ADULT - ASSESSMENT
68F w AF s/p ablation, R breast CA 2017 on letrozole (Oncologist stopped Xarelto 1 year ago), depression, HTN, neuropathy R foot, and OA, BIBEMS s/p fall on 7/13 outside in her yard without headstrike or LOC, she was on the ground for 2+ hrs before being able to get up. Patient originally admitted to medicine for further management of rhabdomyolysis, NSTEMI ([facility] with only non-obstructive CAD on 7/20), and progressively worsening hypoxia thought to be iso KARELY PNA and acute on chronic CHF however despite diuresis and abx patient's hypoxia continued to progress to 6L NC requirement. Sent for CTA Chest today, found to have bilateral PEs with possible RHS. Started on full dose Lovenox. MICU consulted for PE with possible RHS.    Bilateral PEs with possible RHS  Acute Hypoxic Respiratory Failure    Plan:  - Currently HDS without pressors, goal MAP >65.   - Satting mid 90s on 6L NC without respiratory distress. Goal SpO2 >92%. Unclear if PNA, CHF, or PE is primary contributing factor to ongoing hypoxia.  - Ordered for STAT troponin and BNP.   - Unable to POCUS as MICU US is currently broken. Order official limited TTE STAT to r/o RHS.   - Order BLE duplex to r/o DVTs.  - Full dose Lovenox for AC. SCDs.   - Discussed with PERT team Dr Dudley, agree with lack of need to upgrade patient at this time. Await PERT team note for official recs.  - Rest of care per primary team.    Dispo: Patient is currently HDS and does not require vasopressors or upgrade to ICU at this time. Please reconsult in event of acute decompensation. Will follow in periphery.    Case discussed with ICU Dr Moctezuma and IC Dr Dudley.

## 2023-07-22 NOTE — CONSULT NOTE ADULT - SUBJECTIVE AND OBJECTIVE BOX
Patient is a 68y old  Female who presents with a chief complaint of Fall  Rhabdomyolysis  Elevated troponin (22 Jul 2023 17:27)      BRIEF HOSPITAL COURSE: 68F w AF s/p ablation, R breast CA 2017 on letrozole (Oncologist stopped Xarelto 1 year ago), depression, HTN, neuropathy R foot, and OA, BIBEMS s/p fall on 7/13 outside in her yard without headstrike or LOC, she was on the ground for 2+ hrs before being able to get up. Patient originally admitted to medicine for further management of rhabdomyolysis, NSTEMI ([facility] with only non-obstructive CAD on 7/20), and progressively worsening hypoxia thought to be iso KARELY PNA and acute on chronic CHF however despite diuresis and abx patient's hypoxia continued to progress to 6L NC requirement. Sent for CTA Chest today, found to have bilateral PEs with possible RHS. Started on full dose Lovenox. MICU consulted for PE with possible RHS.      PAST MEDICAL & SURGICAL HISTORY:  Breast lump  benign      Malignant neoplasm of right female breast, unspecified site of breast      Breast pain, right      Breast skin changes  right breast      Essential hypertension      SVT (supraventricular tachycardia)      Microscopic hematuria      Osteoarthritis  knees      Rosacea      H/O cardiac radiofrequency ablation      History of lumpectomy of right breast  benign -2003      H/O right breast biopsy  2017      S/P colonoscopy          Review of Systems:  CONSTITUTIONAL: No fever, chills, or fatigue  EYES: No eye pain, visual disturbances, or discharge  ENMT:  No difficulty hearing, tinnitus, vertigo; No sinus or throat pain  NECK: No pain or stiffness  RESPIRATORY: No cough, wheezing, chills or hemoptysis; No shortness of breath  CARDIOVASCULAR: No chest pain, palpitations, dizziness, or leg swelling  GASTROINTESTINAL: No abdominal or epigastric pain. No nausea, vomiting, or hematemesis; No diarrhea or constipation. No melena or hematochezia.  GENITOURINARY: No dysuria, frequency, hematuria, or incontinence  NEUROLOGICAL: No headaches, memory loss, loss of strength, numbness, or tremors  SKIN: No itching, burning, rashes, or lesions   MUSCULOSKELETAL: No joint pain or swelling; No muscle, back, or extremity pain  PSYCHIATRIC: No depression, anxiety, mood swings, or difficulty sleeping      Medications:    metoprolol succinate ER 75 milliGRAM(s) Oral daily  sacubitril 24 mG/valsartan 26 mG 1 Tablet(s) Oral two times a day  spironolactone 25 milliGRAM(s) Oral daily      acetaminophen     Tablet .. 650 milliGRAM(s) Oral every 6 hours PRN  melatonin 3 milliGRAM(s) Oral at bedtime PRN  ondansetron Injectable 4 milliGRAM(s) IV Push every 6 hours PRN  venlafaxine XR. 150 milliGRAM(s) Oral daily    letrozole 2.5 milliGRAM(s) Oral daily    aspirin  chewable 81 milliGRAM(s) Oral daily  enoxaparin Injectable 100 milliGRAM(s) SubCutaneous every 12 hours    aluminum hydroxide/magnesium hydroxide/simethicone Suspension 30 milliLiter(s) Oral every 4 hours PRN      atorvastatin 40 milliGRAM(s) Oral at bedtime                  ICU Vital Signs Last 24 Hrs  T(C): 36.4 (22 Jul 2023 17:25), Max: 36.8 (21 Jul 2023 20:16)  T(F): 97.5 (22 Jul 2023 17:25), Max: 98.2 (21 Jul 2023 20:16)  HR: 112 (22 Jul 2023 17:25) (91 - 112)  BP: 101/68 (22 Jul 2023 17:25) (82/31 - 129/55)  BP(mean): --  ABP: --  ABP(mean): --  RR: 18 (22 Jul 2023 17:25) (18 - 22)  SpO2: 95% (22 Jul 2023 17:25) (87% - 95%)    O2 Parameters below as of 22 Jul 2023 17:25  Patient On (Oxygen Delivery Method): nasal cannula  O2 Flow (L/min): 6              I&O's Detail    22 Jul 2023 07:01  -  22 Jul 2023 18:38  --------------------------------------------------------  IN:  Total IN: 0 mL    OUT:    Voided (mL): 650 mL  Total OUT: 650 mL    Total NET: -650 mL            LABS:                        12.2   5.20  )-----------( 240      ( 21 Jul 2023 07:29 )             35.7     07-22    138  |  106  |  23  ----------------------------<  145<H>  4.2   |  25  |  1.08    Ca    8.2<L>      22 Jul 2023 17:25  Phos  3.2     07-22  Mg     2.0     07-22    TPro  6.6  /  Alb  2.6<L>  /  TBili  0.7  /  DBili  x   /  AST  97<H>  /  ALT  151<H>  /  AlkPhos  88  07-22          CAPILLARY BLOOD GLUCOSE          Urinalysis Basic - ( 22 Jul 2023 17:25 )    Color: x / Appearance: x / SG: x / pH: x  Gluc: 145 mg/dL / Ketone: x  / Bili: x / Urobili: x   Blood: x / Protein: x / Nitrite: x   Leuk Esterase: x / RBC: x / WBC x   Sq Epi: x / Non Sq Epi: x / Bacteria: x      CULTURES:  Culture Results:   No growth (07-16-23 @ 12:53)  Culture Results:   No growth at 5 days (07-16-23 @ 12:53)  Culture Results:   No growth at 5 days (07-16-23 @ 12:53)  Rapid RVP Result: NotDetec (07-16-23 @ 12:53)      Physical Examination:    General: No acute distress.  Alert, oriented, interactive, nonfocal, following simple commands and moving all extremities     HEENT: Pupils equal, reactive to light.  Symmetric.    PULM: Breathing comfortabley on 6L NC, good BS B/L with no Rales or Rhonchi, no significant sputum production    CVS: Tachycardic, irregular rate and rhythm, no murmurs, rubs, or gallops    ABD: BS+ Soft, nondistended, nontender, no masses    EXT: No edema, nontender    SKIN: Warm and well perfused, no rashes noted.    RADIOLOGY:   < from: CT Angio Chest PE Protocol w/ IV Cont (07.22.23 @ 16:57) >  IMPRESSION:    1.  Bilateral pulmonary emboli.  2.  The right atrium right ventricle are enlarged relative to the left   ventricle which could occur in the clinical setting of right heart strain.  3.  The findings were discussed with and read back verification obtained   on 7/22/2023 at 1710 hours from Dr. Vickers.    < end of copied text >      Time spent on this patient encoutner, which includes documenting this note in the EMR, was 76 minutes including assessing the presenting problems with associated risks, reviewing the medical record to prepare for the encounter, and meeting face to face with the patient to obtain additional history. I have also performed and interpreted diagnostic studies as documented. To improve communication and patient safety, I have coordinated with the multidisciplinary team including the bedside nurse, appropriate attending of record, and consultants as needed.

## 2023-07-22 NOTE — PROGRESS NOTE ADULT - SUBJECTIVE AND OBJECTIVE BOX
CHIEF COMPLAINT: Patient is a 68y old  Female who presents with a chief complaint of Fall  Rhabdomyolysis  Elevated troponin (21 Jul 2023 18:35)      HPI:  68 year old female w AF s/p ablation, R breast CA 2017 on letrozole, depression, HTN, neuropathy R foot, and OA, BIBEMS s/p falls      Thursday 07-13 she fell outside in her yard as she was out w her 2 dogs; fell backwards; no head injury or LOC  on the ground for 2 hrs before she was able to get up; scooted to her door  Yesterday was on couch when she rolled on to floor; scooted on her knees and arms to toilet but could not get up   was on the floor until her sister came;  finally pt called the ambulance    when EMS arrived, tried helping pt ambulate but was unable to walk.    pt reports increasing fatigue over past few days  +occasional cough  Wednesday 07-12 her friend visited; no known sick contacts but friend has  grandchildren        In ED /50   HR 92   RR 17   T 100.4   94% RA     .  CT head no acute changes  CT Cspine DJD w narrowing  CT C/A/P  +KARELY PNA, +HM w steatosis, +sclerotic focus R iliac bone  R knee w DJD  cefepime  vanco    7/17-patient well known to me; has above medical history; did have multiple ECHO with normal LV function but no ischemic w/u in the past.   Very elevated troponin levels with in face of rhabdomyolysis with no chest pains ano EKG changes.       7/18-patient with ECHO done l denying chest pains, but with c/o palpitations.   Patient febrile to 100.5-receiving ceftriaxone for suspected pneumonia.  Telemetry monitor showing runs of SVT, (no afib).  ECHO with EF 40% and inferior wall hypokinesis    7/19-patient tolerating medications; now off Eliquis for 48 hours.   On aSA/plavix and beta blockers; spironolactone added for CHF treatment.  ECHO with EF 40% with inferior hypokinesis.  Being treated for pneumonia.   LFTs and CPKs still elevated.  D/w cath team to get patient on schedule for cath.    7/20-s/p cardiac cath-non-obstructive disease    7/21-patient with HFrEF with non obstructive CAD; patient with elevated troponins found in the face of rhabdomyolysis.      7/22-patient with HFrEF; still hypoxic with O2 sat in the low 90's.  BP stable and telmetery with runs of SVT.  Still with fatigue, some SOB and mild edema.  Renal function stable and BP stable.      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 and gentry Willett (16 Jul 2023 22:06)      PMHx: PAST MEDICAL & SURGICAL HISTORY:  Breast lump  benign      Malignant neoplasm of right female breast, unspecified site of breast      Breast pain, right      Breast skin changes  right breast      Essential hypertension      SVT (supraventricular tachycardia)      Microscopic hematuria      Osteoarthritis  knees      Rosacea      H/O cardiac radiofrequency ablation      History of lumpectomy of right breast  benign -2003      H/O right breast biopsy  2017      S/P colonoscopy          Allergies: Allergies    red dye (Other)  Cipro (Other)    Intolerances    spinach (Vomiting)        REVIEW OF SYSTEMS:    CONSTITUTIONAL:  weakness, fevers or chills  EYES/ENT: No visual changes;  No vertigo or throat pain   NECK: No pain or stiffness  RESPIRATORY: cough, wheezing,  shortness of breath  CARDIOVASCULAR:  palpitations  GASTROINTESTINAL: No abdominal or epigastric pain. No nausea, vomiting, or hematemesis; No diarrhea or constipation. No melena or hematochezia.  GENITOURINARY: No dysuria, frequency or hematuria      Vital Signs Last 24 Hrs  T(C): 36.7 (22 Jul 2023 07:54), Max: 36.8 (21 Jul 2023 20:16)  T(F): 98.1 (22 Jul 2023 07:54), Max: 98.2 (21 Jul 2023 20:16)  HR: 91 (22 Jul 2023 07:54) (91 - 96)  BP: 123/57 (22 Jul 2023 07:54) (123/57 - 129/55)  BP(mean): --  RR: 18 (22 Jul 2023 07:54) (18 - 18)  SpO2: 90% (22 Jul 2023 07:54) (90% - 93%)    Parameters below as of 22 Jul 2023 07:54  Patient On (Oxygen Delivery Method): nasal cannula        I&O's Summary          PHYSICAL EXAM:   Constitutional: NAD, awake and alert, well-developed  HEENT: PERR, EOMI, Normal Hearing, MMM  Neck: JVD  Respiratory: Breath sounds  rales or rhonchi  Cardiovascular: S1 and S2, regular rate and rhythm, Murmurs, gallops or rubs  Gastrointestinal: Bowel Sounds present, soft, nontender, nondistended, no guarding, no rebound  Extremities: peripheral edema  Vascular: 2+ peripheral pulses      MEDICATIONS:  MEDICATIONS  (STANDING):  aspirin  chewable 81 milliGRAM(s) Oral daily  letrozole 2.5 milliGRAM(s) Oral daily  metoprolol succinate ER 75 milliGRAM(s) Oral daily  sodium chloride 0.9% Bolus 250 milliLiter(s) IV Bolus once  sodium chloride 0.9%. 1000 milliLiter(s) (200 mL/Hr) IV Continuous <Continuous>  spironolactone 25 milliGRAM(s) Oral daily  venlafaxine XR. 150 milliGRAM(s) Oral daily      LABS: All Labs Reviewed:                        12.2   5.20  )-----------( 240      ( 21 Jul 2023 07:29 )             35.7     07-21    142  |  111<H>  |  20  ----------------------------<  122<H>  3.7   |  25  |  0.78    Ca    8.4<L>      21 Jul 2023 07:29  Phos  3.5     07-21    TPro  x   /  Alb  2.2<L>  /  TBili  x   /  DBili  x   /  AST  x   /  ALT  x   /  AlkPhos  x   07-21          Blood Culture:       BNP     RADIOLOGY:    EKG:      Telemetry:    ECHO:   Findings   There is calcification of both mitral valve leaflets. The leaflet opening is normal.   Mild (1+) mitral regurgitation is present.  EA reversal of the mitral inflow consistent with reduced compliance of   the left ventricle.   The aortic valve is well visualized, appears mildly calcified. Valve   opening seems to be normal.   Normal appearing tricuspid valve structure and function.   Trace tricuspid valve regurgitation is present.   Normal appearing pulmonic valve structure.  Trace to Mild pulmonic valvular regurgitation is present.   The left atrium is mildly dilated.   Estimated left ventricular ejection fraction is 45 %.   The left ventricle is normal in size and wall thickness.   Mild, diffuse hypokinesis of the left ventricle is present.   Normal appearing right atrium.   Normal appearing right ventricle structure and function.   No evidence of pericardial effusion.   No evidence of pleural effusion.   The IVC was not visualized.       CHIEF COMPLAINT: Patient is a 68y old  Female who presents with a chief complaint of Fall  Rhabdomyolysis  Elevated troponin (21 Jul 2023 18:35)      HPI:  68 year old female w AF s/p ablation, R breast CA 2017 on letrozole, depression, HTN, neuropathy R foot, and OA, BIBEMS s/p falls      Thursday 07-13 she fell outside in her yard as she was out w her 2 dogs; fell backwards; no head injury or LOC  on the ground for 2 hrs before she was able to get up; scooted to her door  Yesterday was on couch when she rolled on to floor; scooted on her knees and arms to toilet but could not get up   was on the floor until her sister came;  finally pt called the ambulance    when EMS arrived, tried helping pt ambulate but was unable to walk.    pt reports increasing fatigue over past few days  +occasional cough  Wednesday 07-12 her friend visited; no known sick contacts but friend has  grandchildren        In ED /50   HR 92   RR 17   T 100.4   94% RA     .  CT head no acute changes  CT Cspine DJD w narrowing  CT C/A/P  +KARELY PNA, +HM w steatosis, +sclerotic focus R iliac bone  R knee w DJD  cefepime  vanco    7/17-patient well known to me; has above medical history; did have multiple ECHO with normal LV function but no ischemic w/u in the past.   Very elevated troponin levels with in face of rhabdomyolysis with no chest pains ano EKG changes.       7/18-patient with ECHO done l denying chest pains, but with c/o palpitations.   Patient febrile to 100.5-receiving ceftriaxone for suspected pneumonia.  Telemetry monitor showing runs of SVT, (no afib).  ECHO with EF 40% and inferior wall hypokinesis    7/19-patient tolerating medications; now off Eliquis for 48 hours.   On aSA/plavix and beta blockers; spironolactone added for CHF treatment.  ECHO with EF 40% with inferior hypokinesis.  Being treated for pneumonia.   LFTs and CPKs still elevated.  D/w cath team to get patient on schedule for cath.    7/20-s/p cardiac cath-non-obstructive disease    7/21-patient with HFrEF with non obstructive CAD; patient with elevated troponins found in the face of rhabdomyolysis.      7/22-patient with HFrEF; still hypoxic with O2 sat in the low 90's.  BP stable and telmetery with runs of SVT.  Still with fatigue, some SOB and mild edema.  Renal function stable and BP stable.  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 and gentry Willett (16 Jul 2023 22:06)      PMHx: PAST MEDICAL & SURGICAL HISTORY:  Breast lump  benign      Malignant neoplasm of right female breast, unspecified site of breast      Breast pain, right      Breast skin changes  right breast      Essential hypertension      SVT (supraventricular tachycardia)      Microscopic hematuria      Osteoarthritis  knees      Rosacea      H/O cardiac radiofrequency ablation      History of lumpectomy of right breast  benign -2003      H/O right breast biopsy  2017      S/P colonoscopy          Allergies: Allergies    red dye (Other)  Cipro (Other)    Intolerances    spinach (Vomiting)        REVIEW OF SYSTEMS:    CONSTITUTIONAL:  weakness, fevers or chills  EYES/ENT: No visual changes;  No vertigo or throat pain   NECK: No pain or stiffness  RESPIRATORY: cough, wheezing,  shortness of breath  CARDIOVASCULAR:  palpitations  GASTROINTESTINAL: No abdominal or epigastric pain. No nausea, vomiting, or hematemesis; No diarrhea or constipation. No melena or hematochezia.  GENITOURINARY: No dysuria, frequency or hematuria      Vital Signs Last 24 Hrs  T(C): 36.7 (22 Jul 2023 07:54), Max: 36.8 (21 Jul 2023 20:16)  T(F): 98.1 (22 Jul 2023 07:54), Max: 98.2 (21 Jul 2023 20:16)  HR: 91 (22 Jul 2023 07:54) (91 - 96)  BP: 123/57 (22 Jul 2023 07:54) (123/57 - 129/55)  BP(mean): --  RR: 18 (22 Jul 2023 07:54) (18 - 18)  SpO2: 90% (22 Jul 2023 07:54) (90% - 93%)    Parameters below as of 22 Jul 2023 07:54  Patient On (Oxygen Delivery Method): nasal cannula        I&O's Summary          PHYSICAL EXAM:   Constitutional: NAD, awake and alert, well-developed  HEENT: PERR, EOMI, Normal Hearing, MMM  Neck: JVD  Respiratory: Breath sounds  rales or rhonchi  Cardiovascular: S1 and S2, regular rate and rhythm, Murmurs, gallops or rubs  Gastrointestinal: Bowel Sounds present, soft, nontender, nondistended, no guarding, no rebound  Extremities: peripheral edema  Vascular: 2+ peripheral pulses      MEDICATIONS:  MEDICATIONS  (STANDING):  aspirin  chewable 81 milliGRAM(s) Oral daily  letrozole 2.5 milliGRAM(s) Oral daily  metoprolol succinate ER 75 milliGRAM(s) Oral daily  sodium chloride 0.9% Bolus 250 milliLiter(s) IV Bolus once  sodium chloride 0.9%. 1000 milliLiter(s) (200 mL/Hr) IV Continuous <Continuous>  spironolactone 25 milliGRAM(s) Oral daily  venlafaxine XR. 150 milliGRAM(s) Oral daily      LABS: All Labs Reviewed:                        12.2   5.20  )-----------( 240      ( 21 Jul 2023 07:29 )             35.7     07-21    142  |  111<H>  |  20  ----------------------------<  122<H>  3.7   |  25  |  0.78    Ca    8.4<L>      21 Jul 2023 07:29  Phos  3.5     07-21    TPro  x   /  Alb  2.2<L>  /  TBili  x   /  DBili  x   /  AST  x   /  ALT  x   /  AlkPhos  x   07-21          Blood Culture:       BNP     RADIOLOGY:    EKG:      Telemetry:    ECHO:   Findings   There is calcification of both mitral valve leaflets. The leaflet opening is normal.   Mild (1+) mitral regurgitation is present.  EA reversal of the mitral inflow consistent with reduced compliance of   the left ventricle.   The aortic valve is well visualized, appears mildly calcified. Valve   opening seems to be normal.   Normal appearing tricuspid valve structure and function.   Trace tricuspid valve regurgitation is present.   Normal appearing pulmonic valve structure.  Trace to Mild pulmonic valvular regurgitation is present.   The left atrium is mildly dilated.   Estimated left ventricular ejection fraction is 45 %.   The left ventricle is normal in size and wall thickness.   Mild, diffuse hypokinesis of the left ventricle is present.   Normal appearing right atrium.   Normal appearing right ventricle structure and function.   No evidence of pericardial effusion.   No evidence of pleural effusion.   The IVC was not visualized.

## 2023-07-22 NOTE — PROGRESS NOTE ADULT - ASSESSMENT
patient with  sepsis and rhabdo with HFrEF superimposed on top of pneumonia  4-YXvUT-ndffta BP on spironolactone; can add entresto 24/26 BID-if BP does not tolerate would consider farxiga  2-CAD-elevated troponin in face of rhabdomyolysis-cath with no evidence of obstruction  3-arrhythmia-telemetry with significant SVT-do not want to use amiodarone as patient with current pulmonary issues; if continued hypoxia-get D-dimer and consider pulmonary evluation patient with  sepsis and rhabdo with HFrEF superimposed on top of pneumonia  1-PRvGY-pzwdtg BP on spironolactone; can add entresto 24/26 BID-if BP does not tolerate would consider farxiga  2-CAD-elevated troponin in face of rhabdomyolysis-cath with no evidence of obstruction  3-arrhythmia-telemetry with significant SVT-would consider   4-pulm-CT with resolving infiltrates; if continue hypoxia with increased CHF treatement, would get d-dimer and consult pulmonary patient with  sepsis and rhabdo with HFrEF superimposed on top of pneumonia  3-YJjZT-urfvhy BP on spironolactone; can add entresto 24/26 BID-if BP does not tolerate would consider farxiga  2-CAD-elevated troponin in face of rhabdomyolysis-cath with no evidence of obstruction  3-arrhythmia-telemetry with significant SVT-would consider   4-pulm-CT with resolving infiltrates; if continue hypoxia with increased CHF treatement, would get d-dimer and consult pulmonary-consider CTA for PE evaluation

## 2023-07-22 NOTE — PROGRESS NOTE ADULT - SUBJECTIVE AND OBJECTIVE BOX
Chief Complaint: Fall    Interval Hx: No acute complaints. Seen out of bed in arm chair earlier today. She reports dyspnea with exertion, fatigue, and generalized weakness. No chest pain or tightness. No dizziness or lightheadedness. No palpitations. No fever, chills or cough. No abdominal symptoms. No urinary complaints. Her O2 requirement is increasing, now on 6L/min via NC, SPO2 93-95%. And she has developed sinus tachycardia to 110s. Administered additional 60mg dose of Lovenox on top of her DVT px dose of 40mg she had earlier today. Subsequently switched Lovenox order to full-dose 1mg/kg subcut q12. Obtained CTA today. Noted to have B/L PEs and concern for possible R heart strain, as per Radiology Dr. Muro. Report pending. Ordered troponin and BNP though these are likely elevated as they were elevated earlier this admission. Lastly, she had a transient episode of hypotension / vasovagal event earlier today during placement of IV line. BP has since improved.     ROS: Multi system review is comprehensively negative x 10 systems     Vitals:  T(F): 97.5 (22 Jul 2023 17:25), Max: 98.2 (21 Jul 2023 20:16)  HR: 112 (22 Jul 2023 17:25) (91 - 112)  BP: 101/68 (22 Jul 2023 17:25) (82/31 - 129/55)  RR: 18 (22 Jul 2023 17:25) (18 - 22)  SpO2: 95% (22 Jul 2023 17:25) (87% - 95%) on 6L/min via NC    Exam:  GEN: Comfortable appearing  HEENT: NCAT  pupils equal and reactive, EOMI, no oropharyngeal lesions, erythema, exudates, oral thrush  NECK: Supple, no carotid bruits, no palpable lymph nodes, no thyromegaly  CVS:  +S1, +S2, regular, no murmurs or rubs  RESP: Lungs clear to auscultation bilaterally, no wheezing, rales, rhonchi, good air entry bilaterally  GI: Abdomen soft, non-tender, non-distended, normal BS, no bruits, no abdominal masses, no palpable masses  EXT: Normal muscle tone, no atrophy, no rigidity, no contractions, no clubbing, no cyanosis, no edema, no calf pain, swelling or erythema, pulses equal and symmetric in the upper and lower extremities  SKIN:  no ulcers, lesions or rashes  NEURO:  AAOX3, no focal neurological deficits, follows all commands, able to move extremities spontaneously    Labs:                                          12.2   5.20  )---------( 240                   35.7       142  |  111  |  20  ---------------------<  122  3.7   |  25  |  0.78    Ca  8.4  Phos  3.5         TPro  6.0  /  Alb  2.2  /  TBili  0.3  /  DBili  x   /  AST  234  /  ALT  219  /  AlkPhos  76      Troponin 15,000 --> 8750    Blood cultures negative    Imaging:  CT R knee non con 7/17/23:  No acute fracture or dislocation. Osteoarthritis.    XR B/L knees 7/16/23: Corticated bone fragment adjacent to the upper R femoral lateral condyle concerning for avulsed fracture versus exostosis.    CT A/P WO 7/16/23: There is a 5 mm ill-defined sclerotic focus in the right iliac bone. Although this finding is probably benign, given history of breast cancer, metastatic disease is not reliably excluded. Consider bone scan for further evaluation. Hepatomegaly. Diffuse hepatic steatosis. 2.2 cm probable right ovarian cyst, amenable to sonographic correlation. Spiculated appearing scarring in the right breast may correlate with patient's known history of right breast malignancy. Small hiatal hernia.    CT chest WO 7/16: Large airspace consolidation with surrounding ground glass in the apicoposterior segment of the L upper lobe. Trace L pleural effusion. This finding could represent pneumonia in the appropriate clinical setting. Additional differential includes pulmonary infarction (presence or absence of pulmonary embolism is not assessed on the current noncontrast exam. No acute osseous fracture.     CT C spine 7/16: No acute fracture. No AP plane subluxation. Reversal of normally visualized cervical lordosis. Multilevel degenerative changes    CT head WO 7/16: No CT evidence of acute intracranial hemorrhage. Atherosclerotic changes.     Cardiac Testing:  L heart cath 7/20: Non occlusive CAD    Tele 7/19: SR, rate 60s-80s    Tele 7/18: Episode of NSVT     Echo 7/17/23: Mitral Valve There is calcification of both mitral valve leaflets. The leaflet opening is normal. Mild (1+) mitral regurgitation is present. EA reversal of the mitral inflow consistent with reduced compliance of the left ventricle. Aortic Valve The aortic valve is well visualized, appears mildly calcified. Valve opening seems to be normal. Tricuspid Valve Normal appearing tricuspid valve structure and function. Trace tricuspid valve regurgitation is present. Pulmonic Valve Normal appearing pulmonic valve structure. Trace to Mild pulmonic valvular regurgitation is present. Left Atrium The left atrium is mildly dilated. Left Ventricle Estimated left ventricular ejection fraction is 45 %. The left ventricle is normal in size and wall thickness. Mild, diffuse hypokinesis of the left ventricle is present. Right Atrium Normal appearing right atrium. Right Ventricle Normal appearing right ventricle structure and function. Pericardial Effusion No evidence of pericardial effusion. Pleural Effusion No evidence of pleural effusion. Miscellaneous The IVC was not visualized.    Meds:  MEDICATIONS  (STANDING):  aspirin  chewable 81 milliGRAM(s) Oral daily  letrozole 2.5 milliGRAM(s) Oral daily  metoprolol succinate ER 75 milliGRAM(s) Oral daily  spironolactone 25 milliGRAM(s) Oral daily  venlafaxine XR. 150 milliGRAM(s) Oral daily    MEDICATIONS  (PRN):  acetaminophen     Tablet .. 650 milliGRAM(s) Oral every 6 hours PRN Mild Pain (1 - 3)  aluminum hydroxide/magnesium hydroxide/simethicone Suspension 30 milliLiter(s) Oral every 4 hours PRN Dyspepsia  melatonin 3 milliGRAM(s) Oral at bedtime PRN Insomnia  ondansetron Injectable 4 milliGRAM(s) IV Push every 6 hours PRN Nausea and/or Vomiting   Chief Complaint: Fall    Interval Hx: Patient seen and examined earlier today. Patient was out-of-bed, resting in arm chair. Appeared comfortable. However, she did report ongoing dyspnea with exertion, fatigue and generalized weakness. No chest pain or tightness. No dizziness or lightheadedness. No palpitations. No fever, chills or cough. No abdominal symptoms. No urinary complaints. Her O2 requirement is increasing, now on 6L/min via NC, SPO2 93-95%. And she has developed sinus tachycardia to 110s. Administered additional 60mg dose of Lovenox on top of her DVT px dose of 40mg she had earlier today. Subsequently switched Lovenox order to full-dose 1mg/kg subcut q12. Obtained CTA today. Noted to have B/L PEs and concern for possible R heart strain, as per Radiology Dr. Muro. Report pending. Ordered troponin and BNP though these are likely elevated as they were elevated earlier this admission. Lastly, she had a transient episode of hypotension / vasovagal event earlier today during placement of IV line. BP has since improved.     ROS: Multi system review is comprehensively negative x 10 systems     Vitals:  T(F): 97.5 (22 Jul 2023 17:25), Max: 98.2 (21 Jul 2023 20:16)  HR: 112 (22 Jul 2023 17:25) (91 - 112)  BP: 101/68 (22 Jul 2023 17:25) (82/31 - 129/55)  RR: 18 (22 Jul 2023 17:25) (18 - 22)  SpO2: 95% (22 Jul 2023 17:25) (87% - 95%) on 6L/min via NC    Exam:  GEN: No acute distress  HEENT: NCAT, pupils equal and reactive, EOMI, no oropharyngeal lesions  NECK: Supple, no JVD, no LAD  CVS:  +S1, +S2, regular, tachycardic with rate ~100s  RESP: Normal resp effort at rest, coarse breath sounds B/L  GI: +BS, abdomen soft, non-tender, non-distended  EXT: No edema or tenderness, normal cap refill  SKIN: Warm, dry  NEURO: AOX3, no gross deficits  MOOD: Calm, pleasant    Labs:                                          12.2   5.20  )---------( 240                   35.7       142  |  111  |  20  ---------------------<  122  3.7   |  25  |  0.78    Ca  8.4  Phos  3.5         TPro  6.0  /  Alb  2.2  /  TBili  0.3  /  DBili  x   /  AST  234  /  ALT  219  /  AlkPhos  76      Troponin 14,957 --> 8752   proBNP 5290    Micro:  Blood cultures x 2 7/16: Negative  Urine culture 7/16: Negative    Imaging:  CTA chest W/ 7/22: Bilateral pulmonary emboli. The emboli involve the right main, upper lobar, interlobar, middle lobar and lower lobar arteries along with the segmental arteries of the right upper, middle, and lower lobes. In addition, there are emboli within the left upper and lower lobar arteries and the segmental arteries of the left upper and lower lobe. The right atrium and right ventricle are enlarged relative to the left ventricle. Unchanged lung parenchymal appearance described in study from 7/21.     CT chest WO 7/21: Since 7/16/2023, the left upper lobe groundglass opacities and consolidation have decreased. A follow-up is recommended in 8 weeks   evaluate for resolution/change. Small left pleural effusion.    CT R knee non con 7/17/23:  No acute fracture or dislocation. Osteoarthritis.    XR B/L knees 7/16/23: Corticated bone fragment adjacent to the upper R femoral lateral condyle concerning for avulsed fracture versus exostosis.    CT A/P WO 7/16/23: There is a 5 mm ill-defined sclerotic focus in the right iliac bone. Although this finding is probably benign, given history of breast cancer, metastatic disease is not reliably excluded. Consider bone scan for further evaluation. Hepatomegaly. Diffuse hepatic steatosis. 2.2 cm probable right ovarian cyst, amenable to sonographic correlation. Spiculated appearing scarring in the right breast may correlate with patient's known history of right breast malignancy. Small hiatal hernia.    CT chest WO 7/16: Large airspace consolidation with surrounding ground glass in the apicoposterior segment of the L upper lobe. Trace L pleural effusion. This finding could represent pneumonia in the appropriate clinical setting. Additional differential includes pulmonary infarction (presence or absence of pulmonary embolism is not assessed on the current noncontrast exam. No acute osseous fracture.     CT C spine 7/16: No acute fracture. No AP plane subluxation. Reversal of normally visualized cervical lordosis. Multilevel degenerative changes    CT head WO 7/16: No CT evidence of acute intracranial hemorrhage. Atherosclerotic changes.     Cardiac Testing:  Follow-up echo requested 7/22 to assess for RV strain    Tele 7/22: Sinus tachycardia, rate 90s-110s    L heart cath 7/20: Non occlusive CAD    Tele 7/19: SR, rate 60s-80s    Tele 7/18: Episode of NSVT     Echo 7/17/23: Mitral Valve There is calcification of both mitral valve leaflets. The leaflet opening is normal. Mild (1+) mitral regurgitation is present. EA reversal of the mitral inflow consistent with reduced compliance of the left ventricle. Aortic Valve The aortic valve is well visualized, appears mildly calcified. Valve opening seems to be normal. Tricuspid Valve Normal appearing tricuspid valve structure and function. Trace tricuspid valve regurgitation is present. Pulmonic Valve Normal appearing pulmonic valve structure. Trace to Mild pulmonic valvular regurgitation is present. Left Atrium The left atrium is mildly dilated. Left Ventricle Estimated left ventricular ejection fraction is 45 %. The left ventricle is normal in size and wall thickness. Mild, diffuse hypokinesis of the left ventricle is present. Right Atrium Normal appearing right atrium. Right Ventricle Normal appearing right ventricle structure and function. Pericardial Effusion No evidence of pericardial effusion. Pleural Effusion No evidence of pleural effusion. Miscellaneous The IVC was not visualized.    Meds:  MEDICATIONS  (STANDING):  aspirin  chewable 81 milliGRAM(s) Oral daily  atorvastatin 40 milliGRAM(s) Oral at bedtime  enoxaparin Injectable 100 milliGRAM(s) SubCutaneous every 12 hours  letrozole 2.5 milliGRAM(s) Oral daily  metoprolol succinate ER 75 milliGRAM(s) Oral daily  sacubitril 24 mG/valsartan 26 mG 1 Tablet(s) Oral two times a day  spironolactone 25 milliGRAM(s) Oral daily  venlafaxine XR. 150 milliGRAM(s) Oral daily    MEDICATIONS  (PRN):  acetaminophen     Tablet .. 650 milliGRAM(s) Oral every 6 hours PRN Mild Pain (1 - 3)  aluminum hydroxide/magnesium hydroxide/simethicone Suspension 30 milliLiter(s) Oral every 4 hours PRN Dyspepsia  melatonin 3 milliGRAM(s) Oral at bedtime PRN Insomnia  ondansetron Injectable 4 milliGRAM(s) IV Push every 6 hours PRN Nausea and/or Vomiting

## 2023-07-22 NOTE — PROVIDER CONTACT NOTE (OTHER) - SITUATION
Pt presents s/p fall admitted for rhabdomyolysis.
Pt had 3x episode of loose, liquid diarrhea. 1x episode of vomiting. No white count as of yesterday morning labs.

## 2023-07-22 NOTE — PROGRESS NOTE ADULT - ASSESSMENT
68 year old woman with hx of HTN, AF s/p ablation, R breast CA 2017 on letrozole, R foot neuropathy, osteoartritis, depression, here for further evaluation after a fall, trouble getting up.     Fall  Orthostatics negative. Appears to have been a mechanical fall. Seen by PT, recommended [facility]  - Continue restorative PT sessions  - OOB to chair daily    Rhabdomyolysis  CPK downtrending. Patient improved with IVF hydration  - Encourage Po hydration    Type II MI  Troponin elevated to 15,000 now downtrending. TTE done, diffuse hypokinesis. L hearth cath done today, no occlusive CAD.   - Continue medical management    Hepatic steatosis with mild transaminitis  LFTs gradually improving  - Continue to monitor as outpatient    Acute respiratory failure with hypoxia  Etiology unclear. CXR 7/19. L upper lobe airspace consolidation. Had CXR with same earlier this admission. Treated with ceftriaxone and azithromycin and completed course. Still with hypoxia. Suspect that this could also be pulm congestion, fluid related.   - Obtain CT chest to assess for interval change.  - Oxygen supplementation  - Trial of IV Lasix x 1    Severe sepsis w lactic acidosis due to community acquired pneumonia, unable to rule out Gram-negative organism, present upon admission  Lactate improved. Blood cultures negative. Breathing comfortably but requiring O2 supplementation, possible due to volume overload at this point, rather than pneumonia. S/p azithromycin x 3d. S/p ceftriaxone x 5d.  - Continue to monitor    Acute on chronic systolic CHF  Upon admission she was without signs of decompensation, stable. This admission she received aggressive IV fluid hydration for rhabdomyolysis and now she has hypoxia, presumed fluid overload.   - Continue metoprolol, spironolactone    Hx breast CA  Onc consult appreciated. Sclerotic focus on R iliac, likely benign but given breast cancer hx, patient is ordered for NM bone scan  - F/u bone scan     R ovarian cyst  As noted on imaging  - Outpatient follow up      Dispo: Anticipate DC to [facility] once clinically improved and inpatient workup complete, Mon 7/24       68 year old woman with hx of HTN, AF s/p ablation, R breast CA 2017 on letrozole, R foot neuropathy, osteoarthritis depression, here for further evaluation after a fall, trouble getting up.     Fall  Orthostatics negative. Appears to have been a mechanical fall. Seen by PT, recommended [facility]  - Continue restorative PT sessions  - OOB to chair daily    Rhabdomyolysis  CPK downtrending. Patient improved with IVF hydration  - Encourage Po hydration    Type II MI  Troponin elevated to ~15,000 now down-trending. TTE done, LVEF 45% w/ diffuse hypokinesis. L heart cath done thereafter. No occlusive CAD. Appreciate input from Cardiology  - Continue medical management w/ aspirin, statin, metoprolol    HFmrEF  Chronicity a bit unclear. Could be acute, related to type II MI. Appreciate input from Cardiology. On regimen of metoprolol succinate, Entresto and spironolactone. She received a dose of IV Lasix yesterday in the setting of persisting hypoxia thought to be multifactorial due to possible pneumonia, iatrogenic fluid congestion from IV fluid hydration for rhabdomyolysis, atelectasis, etc. Appears hypoxia may be related to diagnosis of B/L PEs, see below  - Continue metoprolol succinate, Entresto, spironolactone  - Is and Os, daily wt  - F/u with Cardiology    Acute respiratory failure with hypoxia  While she was thought to have pneumonia upon presentation and treated as such, completing a 3-day course of azithromycin and 5-day course of ceftriaxone, she did not have hypoxia in the early stages of her admission. Hypoxia developed after she continued to receive IV fluid hydration for her other presenting issue, that is, rhabdomyolysis in setting of her fall, immobility on floor for few hrs. She received IV Lasix without improvement. CT was performed and showed decreased L lung opacities, small L pleural effusion. Today she was still hypoxic, requiring up to 6L/min O2, also newly tachycardic to 100s-110s. Administered empiric full dose Lovenox for possible PE, obtained CTA chest which did confirm B/L PEs, possible R heart strain, see below  - Continue O2 supplementation as needed  - Continue management of underlying issues    Bilateral pulmonary emboli  As noted on CTA chest today. Possible R heart strain as well.  - Obtain stat troponi nand BNP, though those may be elevated as they were earlier this admission  - Ordered for limited TTE to assess for RV strain, consulted ICU for Critical Care evaluation and POCUS    - COntinue full dose AC with Lovenox 100mg subcut q12 (1mg/kr subcut q12)    Severe sepsis w lactic acidosis due to community acquired pneumonia, unable to rule out Gram-negative organism, present upon admission  Lactate improved. Blood cultures negative. Breathing comfortably but requiring O2 supplementation, possible due to volume overload at this point, rather than pneumonia. S/p azithromycin x 3d. S/p ceftriaxone x 5d.  - Continue to monitor    Hepatic steatosis with mild transaminitis  LFTs gradually improving  - Continue to monitor as outpatient    Hx breast CA  Onc consult appreciated. Sclerotic focus on R iliac, likely benign but given breast cancer hx, patient is ordered for NM bone scan  - F/u bone scan     R ovarian cyst  As noted on imaging  - Outpatient follow up        DVT px: Now on full dose AC for B/L PEs  Code status: Full  Dispo: Anticipate DC to [facility] once clinically improved and inpatient workup complete, > 48 hrs

## 2023-07-23 LAB
ADD ON TEST-SPECIMEN IN LAB: SIGNIFICANT CHANGE UP
ANION GAP SERPL CALC-SCNC: 5 MMOL/L — SIGNIFICANT CHANGE UP (ref 5–17)
BASOPHILS # BLD AUTO: 0.04 K/UL — SIGNIFICANT CHANGE UP (ref 0–0.2)
BASOPHILS NFR BLD AUTO: 0.5 % — SIGNIFICANT CHANGE UP (ref 0–2)
BUN SERPL-MCNC: 23 MG/DL — SIGNIFICANT CHANGE UP (ref 7–23)
CALCIUM SERPL-MCNC: 8.1 MG/DL — LOW (ref 8.5–10.1)
CHLORIDE SERPL-SCNC: 109 MMOL/L — HIGH (ref 96–108)
CO2 SERPL-SCNC: 26 MMOL/L — SIGNIFICANT CHANGE UP (ref 22–31)
CREAT SERPL-MCNC: 0.82 MG/DL — SIGNIFICANT CHANGE UP (ref 0.5–1.3)
EGFR: 77 ML/MIN/1.73M2 — SIGNIFICANT CHANGE UP
EOSINOPHIL # BLD AUTO: 0.1 K/UL — SIGNIFICANT CHANGE UP (ref 0–0.5)
EOSINOPHIL NFR BLD AUTO: 1.3 % — SIGNIFICANT CHANGE UP (ref 0–6)
GLUCOSE SERPL-MCNC: 129 MG/DL — HIGH (ref 70–99)
HCT VFR BLD CALC: 38 % — SIGNIFICANT CHANGE UP (ref 34.5–45)
HGB BLD-MCNC: 12.9 G/DL — SIGNIFICANT CHANGE UP (ref 11.5–15.5)
IMM GRANULOCYTES NFR BLD AUTO: 1.5 % — HIGH (ref 0–0.9)
LYMPHOCYTES # BLD AUTO: 1.18 K/UL — SIGNIFICANT CHANGE UP (ref 1–3.3)
LYMPHOCYTES # BLD AUTO: 15.1 % — SIGNIFICANT CHANGE UP (ref 13–44)
MAGNESIUM SERPL-MCNC: 2.1 MG/DL — SIGNIFICANT CHANGE UP (ref 1.6–2.6)
MCHC RBC-ENTMCNC: 30.5 PG — SIGNIFICANT CHANGE UP (ref 27–34)
MCHC RBC-ENTMCNC: 33.9 GM/DL — SIGNIFICANT CHANGE UP (ref 32–36)
MCV RBC AUTO: 89.8 FL — SIGNIFICANT CHANGE UP (ref 80–100)
MONOCYTES # BLD AUTO: 0.47 K/UL — SIGNIFICANT CHANGE UP (ref 0–0.9)
MONOCYTES NFR BLD AUTO: 6 % — SIGNIFICANT CHANGE UP (ref 2–14)
NEUTROPHILS # BLD AUTO: 5.92 K/UL — SIGNIFICANT CHANGE UP (ref 1.8–7.4)
NEUTROPHILS NFR BLD AUTO: 75.6 % — SIGNIFICANT CHANGE UP (ref 43–77)
PLATELET # BLD AUTO: 275 K/UL — SIGNIFICANT CHANGE UP (ref 150–400)
POTASSIUM SERPL-MCNC: 3.4 MMOL/L — LOW (ref 3.5–5.3)
POTASSIUM SERPL-SCNC: 3.4 MMOL/L — LOW (ref 3.5–5.3)
RBC # BLD: 4.23 M/UL — SIGNIFICANT CHANGE UP (ref 3.8–5.2)
RBC # FLD: 14.3 % — SIGNIFICANT CHANGE UP (ref 10.3–14.5)
SODIUM SERPL-SCNC: 140 MMOL/L — SIGNIFICANT CHANGE UP (ref 135–145)
WBC # BLD: 7.83 K/UL — SIGNIFICANT CHANGE UP (ref 3.8–10.5)
WBC # FLD AUTO: 7.83 K/UL — SIGNIFICANT CHANGE UP (ref 3.8–10.5)

## 2023-07-23 PROCEDURE — 99233 SBSQ HOSP IP/OBS HIGH 50: CPT

## 2023-07-23 PROCEDURE — 93010 ELECTROCARDIOGRAM REPORT: CPT

## 2023-07-23 PROCEDURE — 12345: CPT | Mod: NC

## 2023-07-23 PROCEDURE — 93970 EXTREMITY STUDY: CPT | Mod: 26

## 2023-07-23 RX ORDER — AMIODARONE HYDROCHLORIDE 400 MG/1
200 TABLET ORAL DAILY
Refills: 0 | Status: DISCONTINUED | OUTPATIENT
Start: 2023-07-27 | End: 2023-07-26

## 2023-07-23 RX ORDER — AMIODARONE HYDROCHLORIDE 400 MG/1
400 TABLET ORAL EVERY 8 HOURS
Refills: 0 | Status: DISCONTINUED | OUTPATIENT
Start: 2023-07-23 | End: 2023-07-26

## 2023-07-23 RX ORDER — AMIODARONE HYDROCHLORIDE 400 MG/1
TABLET ORAL
Refills: 0 | Status: DISCONTINUED | OUTPATIENT
Start: 2023-07-23 | End: 2023-07-26

## 2023-07-23 RX ORDER — POTASSIUM CHLORIDE 20 MEQ
40 PACKET (EA) ORAL EVERY 4 HOURS
Refills: 0 | Status: COMPLETED | OUTPATIENT
Start: 2023-07-23 | End: 2023-07-23

## 2023-07-23 RX ORDER — SODIUM CHLORIDE 9 MG/ML
500 INJECTION INTRAMUSCULAR; INTRAVENOUS; SUBCUTANEOUS ONCE
Refills: 0 | Status: COMPLETED | OUTPATIENT
Start: 2023-07-23 | End: 2023-07-23

## 2023-07-23 RX ORDER — APIXABAN 2.5 MG/1
10 TABLET, FILM COATED ORAL
Refills: 0 | Status: DISCONTINUED | OUTPATIENT
Start: 2023-07-23 | End: 2023-07-23

## 2023-07-23 RX ORDER — ENOXAPARIN SODIUM 100 MG/ML
100 INJECTION SUBCUTANEOUS EVERY 12 HOURS
Refills: 0 | Status: DISCONTINUED | OUTPATIENT
Start: 2023-07-23 | End: 2023-07-26

## 2023-07-23 RX ADMIN — Medication 40 MILLIEQUIVALENT(S): at 15:55

## 2023-07-23 RX ADMIN — Medication 40 MILLIEQUIVALENT(S): at 11:41

## 2023-07-23 RX ADMIN — SODIUM CHLORIDE 500 MILLILITER(S): 9 INJECTION INTRAMUSCULAR; INTRAVENOUS; SUBCUTANEOUS at 18:00

## 2023-07-23 RX ADMIN — AMIODARONE HYDROCHLORIDE 400 MILLIGRAM(S): 400 TABLET ORAL at 21:38

## 2023-07-23 RX ADMIN — ENOXAPARIN SODIUM 100 MILLIGRAM(S): 100 INJECTION SUBCUTANEOUS at 21:32

## 2023-07-23 RX ADMIN — Medication 150 MILLIGRAM(S): at 11:41

## 2023-07-23 RX ADMIN — SACUBITRIL AND VALSARTAN 1 TABLET(S): 24; 26 TABLET, FILM COATED ORAL at 21:33

## 2023-07-23 RX ADMIN — SPIRONOLACTONE 25 MILLIGRAM(S): 25 TABLET, FILM COATED ORAL at 11:40

## 2023-07-23 RX ADMIN — Medication 81 MILLIGRAM(S): at 11:40

## 2023-07-23 RX ADMIN — APIXABAN 10 MILLIGRAM(S): 2.5 TABLET, FILM COATED ORAL at 11:40

## 2023-07-23 RX ADMIN — SACUBITRIL AND VALSARTAN 1 TABLET(S): 24; 26 TABLET, FILM COATED ORAL at 11:39

## 2023-07-23 RX ADMIN — LETROZOLE 2.5 MILLIGRAM(S): 2.5 TABLET, FILM COATED ORAL at 11:40

## 2023-07-23 RX ADMIN — AMIODARONE HYDROCHLORIDE 400 MILLIGRAM(S): 400 TABLET ORAL at 06:57

## 2023-07-23 RX ADMIN — ATORVASTATIN CALCIUM 40 MILLIGRAM(S): 80 TABLET, FILM COATED ORAL at 21:33

## 2023-07-23 NOTE — PROGRESS NOTE ADULT - SUBJECTIVE AND OBJECTIVE BOX
CHIEF COMPLAINT: Patient is a 68y old  Female who presents with a chief complaint of elevated troponin  acute rhabdomyolysis   falls (22 Jul 2023 18:37)      HPI:  68 year old female w AF s/p ablation, R breast CA 2017 on letrozole, depression, HTN, neuropathy R foot, and OA, BIBEMS s/p falls      Thursday 07-13 she fell outside in her yard as she was out w her 2 dogs; fell backwards; no head injury or LOC  on the ground for 2 hrs before she was able to get up; scooted to her door  Yesterday was on couch when she rolled on to floor; scooted on her knees and arms to toilet but could not get up   was on the floor until her sister came;  finally pt called the ambulance    when EMS arrived, tried helping pt ambulate but was unable to walk.    pt reports increasing fatigue over past few days  +occasional cough  Wednesday 07-12 her friend visited; no known sick contacts but friend has  grandchildren        In ED /50   HR 92   RR 17   T 100.4   94% RA     .  CT head no acute changes  CT Cspine DJD w narrowing  CT C/A/P  +KARELY PNA, +HM w steatosis, +sclerotic focus R iliac bone  R knee w DJD  cefepime  vanco    7/17-patient well known to me; has above medical history; did have multiple ECHO with normal LV function but no ischemic w/u in the past.   Very elevated troponin levels with in face of rhabdomyolysis with no chest pains ano EKG changes.       7/18-patient with ECHO done l denying chest pains, but with c/o palpitations.   Patient febrile to 100.5-receiving ceftriaxone for suspected pneumonia.  Telemetry monitor showing runs of SVT, (no afib).  ECHO with EF 40% and inferior wall hypokinesis    7/19-patient tolerating medications; now off Eliquis for 48 hours.   On aSA/plavix and beta blockers; spironolactone added for CHF treatment.  ECHO with EF 40% with inferior hypokinesis.  Being treated for pneumonia.   LFTs and CPKs still elevated.  D/w cath team to get patient on schedule for cath.    7/20-s/p cardiac cath-non-obstructive disease    7/21-patient with HFrEF with non obstructive CAD; patient with elevated troponins found in the face of rhabdomyolysis.      7/22-patient with HFrEF; still hypoxic with O2 sat in the low 90's.  BP stable and telmetery with runs of SVT.  Still with fatigue, some SOB and mild edema.  Renal function stable and BP stable.  CT of chest with small effusions and resolving pneumonia/infiltrates    7/23-patient with continued hypoxia, resolving infiltrates and after d/w hospitalist-CTA done-bilateral PE; started on AC; In addition, had diarrhea yesterday and last night-currently on isolation to check for neurovirus and other infecting organisms.   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 and gentry [place]lett (16 Jul 2023 22:06)      PMHx: PAST MEDICAL & SURGICAL HISTORY:  Breast lump  benign      Malignant neoplasm of right female breast, unspecified site of breast      Breast pain, right      Breast skin changes  right breast      Essential hypertension      SVT (supraventricular tachycardia)      Microscopic hematuria      Osteoarthritis  knees      Rosacea      H/O cardiac radiofrequency ablation      History of lumpectomy of right breast  benign -2003      H/O right breast biopsy  2017      S/P colonoscopy          Allergies: Allergies    red dye (Other)  Cipro (Other)    Intolerances    spinach (Vomiting)        REVIEW OF SYSTEMS:    CONSTITUTIONAL: weakness, fevers or chills  EYES/ENT: No visual changes;  No vertigo or throat pain   NECK: No pain or stiffness  RESPIRATORY:  shortness of breath  CARDIOVASCULAR: No chest pain or palpitations  GASTROINTESTINAL: No abdominal or epigastric pain. No nausea, vomiting, or hematemesis; diarrhea, no constipation. No melena or hematochezia.      Vital Signs Last 24 Hrs  T(C): 36.7 (22 Jul 2023 21:22), Max: 36.7 (22 Jul 2023 07:54)  T(F): 98 (22 Jul 2023 21:22), Max: 98.1 (22 Jul 2023 07:54)  HR: 95 (22 Jul 2023 21:22) (91 - 112)  BP: 129/82 (22 Jul 2023 21:22) (82/31 - 129/82)  BP(mean): --  RR: 18 (22 Jul 2023 21:22) (18 - 22)  SpO2: 95% (22 Jul 2023 21:22) (87% - 95%)    Parameters below as of 22 Jul 2023 21:22  Patient On (Oxygen Delivery Method): nasal cannula  O2 Flow (L/min): 6      I&O's Summary    22 Jul 2023 07:01  -  23 Jul 2023 05:25  --------------------------------------------------------  IN: 0 mL / OUT: 650 mL / NET: -650 mL            PHYSICAL EXAM:   Constitutional: NAD, awake and alert, well-developed  HEENT: PERR, EOMI, Normal Hearing, MMM  Neck: JVD  Respiratory: Breath sounds are clear bilaterally, No wheezing, rales or rhonchi  Cardiovascular: S1 and S2, regular rate and rhythm, Murmurs, gallops or rubs  Gastrointestinal: Bowel Sounds present, soft, nontender, nondistended, no guarding, no rebound  Extremities: No peripheral edema  Vascular: 2+ peripheral pulses      MEDICATIONS:  MEDICATIONS  (STANDING):  aspirin  chewable 81 milliGRAM(s) Oral daily  atorvastatin 40 milliGRAM(s) Oral at bedtime  enoxaparin Injectable 100 milliGRAM(s) SubCutaneous every 12 hours  letrozole 2.5 milliGRAM(s) Oral daily  metoprolol succinate ER 75 milliGRAM(s) Oral daily  sacubitril 24 mG/valsartan 26 mG 1 Tablet(s) Oral two times a day  spironolactone 25 milliGRAM(s) Oral daily  venlafaxine XR. 150 milliGRAM(s) Oral daily      LABS: All Labs Reviewed:                        12.2   5.20  )-----------( 240      ( 21 Jul 2023 07:29 )             35.7     07-22    138  |  106  |  23  ----------------------------<  145<H>  4.2   |  25  |  1.08    Ca    8.2<L>      22 Jul 2023 17:25  Phos  3.2     07-22  Mg     2.0     07-22    TPro  6.6  /  Alb  2.6<L>  /  TBili  0.7  /  DBili  x   /  AST  97<H>  /  ALT  151<H>  /  AlkPhos  88  07-22          Blood Culture:       BNP     RADIOLOGY:  ACC: [id number] [facility]   ORDERED BY: OUMAR CASTRO   PROCEDURE DATE:  07/22/2023    INTERPRETATION:  CTA CHEST  INDICATION: still quite hypoxic despite clearing infiltrates, r/o PE.  TECHNIQUE: Enhanced helical images were obtained of the chest. Coronal   and sagittal images were reconstructed.  Images were obtained after the   uneventful administration of 90 cc of nonionic intravenous contrast   (Omnipaque 350). 10 cc of nonionic intravenous contrast (Omnipaque 350)   was discarded. Maximum intensity projection images were generated.    COMPARISON: CT chest 7/21/2023 and 7/16/2023.  FINDINGS:  Pulmonary Artery:  Bilateral pulmonary emboli. The emboli involve the   right main, upper lobar, interlobar, middle lobar and lower lobar   arteries along with the segmental arteries of the right upper, middle,   and lower lobes. In addition, there are emboli within the left upper and   lower lobar arteries and the segmental arteries of the left upperand   lower lobe.  Tubes/Lines: None.  Lungs, airways and pleura: Since 7/21/2023, the left upper lobe   groundglass opacities, septal thickening, and patchy consolidation are   unchanged. Small left pleural effusion and left lower lobe passive   atelectasis. Dependent right lower lobe atelectasis.  The airways and pleura are unremarkable  Mediastinum: The thyroid gland is normal. The prevascular, lower   paratracheal and subcarinal lymph nodes are unchanged.  The atrium right ventricleare enlarged. The right ventricle is increased   in size relative to the left ventricle.. No pericardial effusion. The   aorta is normal in caliber. Hiatal hernia.  Upper Abdomen: The upper abdomen is unremarkable.  Bones And Soft Tissues: The bones are unremarkable.  The soft tissues are   unremarkable.      IMPRESSION:  1.  Bilateral pulmonary emboli.  2.  The right atrium right ventricle are enlarged relative to the left   ventricle which could occur in the clinical setting of right heart strain.  3.  The findings were discussed with and read back verification obtained     EKG:      Telemetry:    ECHO:

## 2023-07-23 NOTE — PROGRESS NOTE ADULT - SUBJECTIVE AND OBJECTIVE BOX
Chief Complaint: Fall    Interval Hx: Patient noted yesterday to have new finding of B/L PE, on full dose AC. Also episodes of SVT despite metoprolol, since switched by Cardiology to amiodarone. Patient seen and examined earlier today. Resting comfortably at that time. Oxygenation is slightly improved today compared to yesterday. She reports continued dyspnea on exertion but is otherwise stable. no dyspnea at rest. No chest pain. No pleurisy. No cough. No other complaints aside from generalized weakness and deconditioning she attributes to her prolonging hospitalization, and some questionable diarrhea. So far today 2 bowel movements, somewhat loose.      ROS: Multi system review is comprehensively negative x 10 systems     Vitals:  T(F): 97.8 (23 Jul 2023 07:45), Max: 98 (22 Jul 2023 21:22)  HR: 91 (23 Jul 2023 14:00) (87 - 95)  BP: 90/65 (23 Jul 2023 14:00) (90/65 - 129/82)  RR: 18 (23 Jul 2023 14:00) (18 - 19)  SpO2: 96% (23 Jul 2023 14:00) (95% - 96%) on 5L/min    Exam:  GEN: No acute distress  HEENT: NCAT, pupils equal and reactive, EOMI, no oropharyngeal lesions  NECK: Supple, no JVD, no LAD  CVS:  +S1, +S2, regular, tachycardic with rate ~100s  RESP: Normal resp effort at rest, coarse breath sounds B/L  GI: +BS, abdomen soft, non-tender, non-distended  EXT: No edema or tenderness, normal cap refill  SKIN: Warm, dry  NEURO: AOX3, no gross deficits  MOOD: Calm, pleasant    Labs:                                12.9   7.83  )---------( 275                 38.0       140  |  109  |  23  --------------------<  129  3.4   |  26  |  0.82    Ca  8.1   Phos 3.2     Mg 2.1     TPro  6.6  /  Alb  2.6  /  TBili  0.7  /  DBili  x   /  AST  97 /  ALT  151  /  AlkPhos  88      Troponin 14,957 --> 8752   proBNP 5290    Micro:  Blood cultures x 2 7/16: Negative  Urine culture 7/16: Negative    Imaging:  CTA chest W/ 7/22: Bilateral pulmonary emboli. The emboli involve the right main, upper lobar, interlobar, middle lobar and lower lobar arteries along with the segmental arteries of the right upper, middle, and lower lobes. In addition, there are emboli within the left upper and lower lobar arteries and the segmental arteries of the left upper and lower lobe. The right atrium and right ventricle are enlarged relative to the left ventricle. Unchanged lung parenchymal appearance described in study from 7/21.     CT chest WO 7/21: Since 7/16/2023, the left upper lobe groundglass opacities and consolidation have decreased. A follow-up is recommended in 8 weeks   evaluate for resolution/change. Small left pleural effusion.    CT R knee non con 7/17/23:  No acute fracture or dislocation. Osteoarthritis.    XR B/L knees 7/16/23: Corticated bone fragment adjacent to the upper R femoral lateral condyle concerning for avulsed fracture versus exostosis.    CT A/P WO 7/16/23: There is a 5 mm ill-defined sclerotic focus in the right iliac bone. Although this finding is probably benign, given history of breast cancer, metastatic disease is not reliably excluded. Consider bone scan for further evaluation. Hepatomegaly. Diffuse hepatic steatosis. 2.2 cm probable right ovarian cyst, amenable to sonographic correlation. Spiculated appearing scarring in the right breast may correlate with patient's known history of right breast malignancy. Small hiatal hernia.    CT chest WO 7/16: Large airspace consolidation with surrounding ground glass in the apicoposterior segment of the L upper lobe. Trace L pleural effusion. This finding could represent pneumonia in the appropriate clinical setting. Additional differential includes pulmonary infarction (presence or absence of pulmonary embolism is not assessed on the current noncontrast exam. No acute osseous fracture.     CT C spine 7/16: No acute fracture. No AP plane subluxation. Reversal of normally visualized cervical lordosis. Multilevel degenerative changes    CT head WO 7/16: No CT evidence of acute intracranial hemorrhage. Atherosclerotic changes.     Cardiac Testing:  Tele 7/22-7/23: Episodes of SVT    Follow-up echo requested 7/22 to assess for RV strain    Tele 7/22: Sinus tachycardia, rate 90s-110s    L heart cath 7/20: Non occlusive CAD    Tele 7/19: SR, rate 60s-80s    Tele 7/18: Episode of NSVT     Echo 7/17/23: Mitral Valve There is calcification of both mitral valve leaflets. The leaflet opening is normal. Mild (1+) mitral regurgitation is present. EA reversal of the mitral inflow consistent with reduced compliance of the left ventricle. Aortic Valve The aortic valve is well visualized, appears mildly calcified. Valve opening seems to be normal. Tricuspid Valve Normal appearing tricuspid valve structure and function. Trace tricuspid valve regurgitation is present. Pulmonic Valve Normal appearing pulmonic valve structure. Trace to Mild pulmonic valvular regurgitation is present. Left Atrium The left atrium is mildly dilated. Left Ventricle Estimated left ventricular ejection fraction is 45 %. The left ventricle is normal in size and wall thickness. Mild, diffuse hypokinesis of the left ventricle is present. Right Atrium Normal appearing right atrium. Right Ventricle Normal appearing right ventricle structure and function. Pericardial Effusion No evidence of pericardial effusion. Pleural Effusion No evidence of pleural effusion. Miscellaneous The IVC was not visualized.    Meds:  MEDICATIONS  (STANDING):  aMIOdarone    Tablet 400 milliGRAM(s) Oral every 8 hours  apixaban 10 milliGRAM(s) Oral two times a day  aspirin  chewable 81 milliGRAM(s) Oral daily  atorvastatin 40 milliGRAM(s) Oral at bedtime  letrozole 2.5 milliGRAM(s) Oral daily  sacubitril 24 mG/valsartan 26 mG 1 Tablet(s) Oral two times a day  sodium chloride 0.9% Bolus 500 milliLiter(s) IV Bolus once  spironolactone 25 milliGRAM(s) Oral daily  venlafaxine XR. 150 milliGRAM(s) Oral daily    MEDICATIONS  (PRN):  acetaminophen     Tablet .. 650 milliGRAM(s) Oral every 6 hours PRN Mild Pain (1 - 3)  aluminum hydroxide/magnesium hydroxide/simethicone Suspension 30 milliLiter(s) Oral every 4 hours PRN Dyspepsia  melatonin 3 milliGRAM(s) Oral at bedtime PRN Insomnia  ondansetron Injectable 4 milliGRAM(s) IV Push every 6 hours PRN Nausea and/or Vomiting

## 2023-07-23 NOTE — PROGRESS NOTE ADULT - ASSESSMENT
68 year old woman with hx of HTN, AF s/p ablation, R breast CA 2017 on letrozole, R foot neuropathy, osteoarthritis depression, here for further evaluation after a fall, trouble getting up.     Fall  Orthostatics negative. Appears to have been a mechanical fall. Seen by PT, recommended [facility]  - Continue restorative PT sessions  - OOB to chair daily    Rhabdomyolysis  CPK downtrended with IVF hydration  - Encourage Po hydration at this point    Type II MI  Troponin elevated to ~15,000 now down-trending. TTE done, LVEF 45% w/ diffuse hypokinesis. L heart cath done thereafter. No occlusive CAD. Appreciate input from Cardiology  - Continue medical management w/ aspirin, statin  - No longer on metoprolol as she's had intermittent hypotension    HFmrEF  Chronicity a bit unclear. Could be acute, related to type II MI. Appreciate input from Cardiology. She was on regimen of metoprolol succinate, Entresto and spironolactone, now off the metoprolol due to intermittent hypotension and SVT episodes for which Cardiology switched metoprolol to amiodarone.   - Continue Entresto, spironolactone  - Is and Os, daily wt  - F/u with Cardiology    Episodes of SVT  Possible due to new diagnosis of B/L PE, see below. Continued to have SVT episodes despite metoprolol.   - Now on amiodarone in place of metoprolol as per Cardiology  - Continue to monitor on tele    Acute respiratory failure with hypoxia  While she was thought to have pneumonia upon presentation and treated as such, completing a 3-day course of azithromycin and 5-day course of ceftriaxone, she did not have hypoxia in the early stages of her admission. Hypoxia developed after she continued to receive IV fluid hydration for her other presenting issue, that is, rhabdomyolysis in setting of her fall, immobility on floor for few hrs. She received IV Lasix without improvement. CT was performed and showed decreased L lung opacities, small L pleural effusion. on 7/22, she was still hypoxic, requiring up to 6L/min O2, also newly tachycardic to 100s-110s so she was started on full dose Lovenox empirically for possible PE, underwent CTA that confirmed B/L PE, see below.  - Continue O2 supplementation as needed  - Continue management of underlying issues    Bilateral pulmonary emboli  As noted on CTA chest today. Possible R heart strain as well. Troponin and BNP elevated but downtrending when compared to earlier this admission. Requested ICU team to obtain POCUS echo to r/o RV strain as echo lab closed. However, ICU POCUS broken. Awaiting TTE. Patient's condition remains tenuous on full dose AC.   - Continue full dose AC with Lovenox 100mg subcut q12 (1mg/kr subcut q12)  - Awaiting TTE and/or ICU POCUS to r/o RV strain  - Close monitoring  - Notify PERT Team (already aware of patient) if she develops persisting hypotension or profound respiratory failure    Severe sepsis w lactic acidosis due to community acquired pneumonia, unable to rule out Gram-negative organism, present upon admission  Lactate improved. Blood cultures negative. Breathing comfortably but requiring O2 supplementation, possible due to volume overload at this point, rather than pneumonia. S/p azithromycin x 3d. S/p ceftriaxone x 5d.  - Continue to monitor    Hepatic steatosis with mild transaminitis  LFTs gradually improving  - Continue to monitor as outpatient    Hx breast CA  Onc consult appreciated. Sclerotic focus on R iliac, likely benign but given breast cancer hx, patient is ordered for NM bone scan  - F/u bone scan     R ovarian cyst  As noted on imaging  - Outpatient follow up        DVT px: Now on full dose AC for B/L PEs  Code status: Full  Dispo: Anticipate DC to [facility] once clinically improved and inpatient workup complete, > 48 hrs       68 year old woman with hx of HTN, AF s/p ablation, R breast CA 2017 on letrozole, R foot neuropathy, osteoarthritis depression, here for further evaluation after a fall, trouble getting up.     Fall  Orthostatics negative. Appears to have been a mechanical fall. Seen by PT, recommended [facility]  - Continue restorative PT sessions  - OOB to chair daily    Rhabdomyolysis  CPK downtrended with IVF hydration  - Encourage Po hydration at this point    Type II MI  Troponin elevated to ~15,000 now down-trending. TTE done, LVEF 45% w/ diffuse hypokinesis. L heart cath done thereafter. No occlusive CAD. Appreciate input from Cardiology  - Continue medical management w/ aspirin, statin  - No longer on metoprolol as she's had intermittent hypotension    HFmrEF  Chronicity a bit unclear. Could be acute, related to type II MI. Appreciate input from Cardiology. She was on regimen of metoprolol succinate, Entresto and spironolactone, now off the metoprolol due to intermittent hypotension and SVT episodes for which Cardiology switched metoprolol to amiodarone.   - Continue Entresto, spironolactone  - Is and Os, daily wt  - F/u with Cardiology    Episodes of SVT  Possible due to new diagnosis of B/L PE, see below. Continued to have SVT episodes despite metoprolol.   - Now on amiodarone in place of metoprolol as per Cardiology  - Continue to monitor on tele    Acute respiratory failure with hypoxia  While she was thought to have pneumonia upon presentation and treated as such, completing a 3-day course of azithromycin and 5-day course of ceftriaxone, she did not have hypoxia in the early stages of her admission. Hypoxia developed after she continued to receive IV fluid hydration for her other presenting issue, that is, rhabdomyolysis in setting of her fall, immobility on floor for few hrs. She received IV Lasix without improvement. CT was performed and showed decreased L lung opacities, small L pleural effusion. on 7/22, she was still hypoxic, requiring up to 6L/min O2, also newly tachycardic to 100s-110s so she was started on full dose Lovenox empirically for possible PE, underwent CTA that confirmed B/L PE, see below.  - Continue O2 supplementation as needed  - Continue management of underlying issues    Bilateral pulmonary emboli  As noted on CTA chest today. Possible R heart strain as well. Troponin and BNP elevated but downtrending when compared to earlier this admission. Requested ICU team to obtain POCUS echo to r/o RV strain as echo lab closed. However, ICU POCUS broken. Awaiting TTE. Patient's condition remains tenuous on full dose AC.   - Continue full dose AC with Lovenox 100mg subcut q12 (1mg/kr subcut q12)  - Awaiting TTE and/or ICU POCUS to r/o RV strain  - Close monitoring  - Notify PERT Team (already aware of patient) if she develops persisting hypotension or profound respiratory failure    Severe sepsis w lactic acidosis due to community acquired pneumonia, unable to rule out Gram-negative organism, present upon admission  Lactate improved. Blood cultures negative. Breathing comfortably but requiring O2 supplementation, possible due to volume overload at this point, rather than pneumonia. S/p azithromycin x 3d. S/p ceftriaxone x 5d.  - Continue to monitor    Hepatic steatosis with mild transaminitis  LFTs gradually improving  - Continue to monitor as outpatient    Hx breast CA  Onc consult appreciated. Sclerotic focus on R iliac, likely benign but given breast cancer hx, patient is ordered for NM bone scan  - F/u bone scan     R ovarian cyst  As noted on imaging  - Outpatient follow up    Loose stools  No leukocytosis. She was on antibiotics recently including ceftriaxone.   - Continue to monitor, if loose stools persist, place on contact isolation and order Cdiff PCR to r/o Cdiff infection    Physical deconditioning and debility  PT consulted  - OOB to chair daily   - F/u PT eval        DVT px: Now on full dose AC for B/L PEs  Code status: Full  Dispo: Anticipate DC to [facility] once clinically improved and inpatient workup complete, > 48 hrs       68 year old woman with hx of HTN, AF s/p ablation, R breast CA 2017 on letrozole, R foot neuropathy, osteoarthritis depression, here for further evaluation after a fall, trouble getting up.     Fall  Orthostatics negative. Appears to have been a mechanical fall. Seen by PT, recommended [facility]  - Continue restorative PT sessions  - OOB to chair daily    Rhabdomyolysis  CPK downtrended with IVF hydration  - Encourage Po hydration at this point    Type II MI  Troponin elevated to ~15,000 now down-trending. TTE done, LVEF 45% w/ diffuse hypokinesis. L heart cath done thereafter. No occlusive CAD. Appreciate input from Cardiology  - Continue medical management w/ aspirin, statin  - No longer on metoprolol as she's had intermittent hypotension    HFmrEF  Chronicity a bit unclear. Could be acute, related to type II MI. Appreciate input from Cardiology. She was on regimen of metoprolol succinate, Entresto and spironolactone, now off the metoprolol due to intermittent hypotension and SVT episodes for which Cardiology switched metoprolol to amiodarone.   - Continue Entresto, spironolactone  - Is and Os, daily wt  - F/u with Cardiology    Episodes of SVT  Possible due to new diagnosis of B/L PE, see below. Continued to have SVT episodes despite metoprolol.   - Now on amiodarone in place of metoprolol as per Cardiology  - Continue to monitor on tele    Acute respiratory failure with hypoxia  While she was thought to have pneumonia upon presentation and treated as such, completing a 3-day course of azithromycin and 5-day course of ceftriaxone, she did not have hypoxia in the early stages of her admission. Hypoxia developed after she continued to receive IV fluid hydration for her other presenting issue, that is, rhabdomyolysis in setting of her fall, immobility on floor for few hrs. She received IV Lasix without improvement. CT was performed and showed decreased L lung opacities, small L pleural effusion. on 7/22, she was still hypoxic, requiring up to 6L/min O2, also newly tachycardic to 100s-110s so she was started on full dose Lovenox empirically for possible PE, underwent CTA that confirmed B/L PE, see below.  - Continue O2 supplementation as needed  - Continue management of underlying issues    Bilateral pulmonary emboli  As noted on CTA chest today. Possible R heart strain as well. Troponin and BNP elevated but downtrending when compared to earlier this admission. Requested ICU team to obtain POCUS echo to r/o RV strain as echo lab closed. However, ICU POCUS broken. Awaiting TTE. Patient's condition remains tenuous on full dose AC.   - Continue full dose AC with Lovenox 100mg subcut q12 (1mg/kr subcut q12)  - Awaiting TTE and/or ICU POCUS to r/o RV strain  - Close monitoring  - Notify PERT Team (already aware of patient) if she develops persisting hypotension or profound respiratory failure    Severe sepsis w lactic acidosis due to community acquired pneumonia, unable to rule out Gram-negative organism, present upon admission  Lactate improved. Blood cultures negative. Breathing comfortably but requiring O2 supplementation, possible due to volume overload at this point, rather than pneumonia. S/p azithromycin x 3d. S/p ceftriaxone x 5d.  - Continue to monitor    Hepatic steatosis with mild transaminitis  LFTs gradually improving  - Continue to monitor as outpatient    Hx breast CA  Onc consult appreciated. Sclerotic focus on R iliac, likely benign but given breast cancer hx, patient is ordered for NM bone scan  - F/u bone scan     R ovarian cyst  As noted on imaging  - Outpatient follow up    Loose stools  No leukocytosis. She was on antibiotics recently including ceftriaxone.   - Continue to monitor, if loose stools persist, place on contact isolation and order Cdiff PCR to r/o Cdiff infection    Hypokalemia  In setting of loose stools. Ordered for potassium supplementation. Checked Mg, normal.  - Continue to monitor    Physical deconditioning and debility  PT consulted  - OOB to chair daily   - F/u PT eval        DVT px: Now on full dose AC for B/L PEs  Code status: Full  Dispo: Anticipate DC to [facility] once clinically improved and inpatient workup complete, > 48 hrs

## 2023-07-23 NOTE — PROGRESS NOTE ADULT - ASSESSMENT
patient with rhabdo, CHF (HHFrEF-nonischemic) with concomitant pneumonia and bilateral PE; now with diarrhea  7-NSY-NQrGS-on spironolactone and entresto; BP is stable no further titration of medications  2-CAD-not active  3-arrhythmia-still with runs of SVT-on metoprolol 75mg-will stop and change to amiodarone load/get EKGs  4-PE-on AC-can cause arrhtymia

## 2023-07-23 NOTE — CHART NOTE - NSCHARTNOTEFT_GEN_A_CORE
Patient had 3x episode of loose, liquid diarrhea. 1x episode of vomiting. No white count as of yesterday morning labs.  Ordered:  GI PCR.

## 2023-07-24 LAB
ADD ON TEST-SPECIMEN IN LAB: SIGNIFICANT CHANGE UP
ANION GAP SERPL CALC-SCNC: 6 MMOL/L — SIGNIFICANT CHANGE UP (ref 5–17)
BASOPHILS # BLD AUTO: 0.05 K/UL — SIGNIFICANT CHANGE UP (ref 0–0.2)
BASOPHILS NFR BLD AUTO: 0.9 % — SIGNIFICANT CHANGE UP (ref 0–2)
BUN SERPL-MCNC: 17 MG/DL — SIGNIFICANT CHANGE UP (ref 7–23)
CALCIUM SERPL-MCNC: 8 MG/DL — LOW (ref 8.5–10.1)
CHLORIDE SERPL-SCNC: 112 MMOL/L — HIGH (ref 96–108)
CO2 SERPL-SCNC: 23 MMOL/L — SIGNIFICANT CHANGE UP (ref 22–31)
CREAT SERPL-MCNC: 0.73 MG/DL — SIGNIFICANT CHANGE UP (ref 0.5–1.3)
EGFR: 88 ML/MIN/1.73M2 — SIGNIFICANT CHANGE UP
EOSINOPHIL # BLD AUTO: 0.14 K/UL — SIGNIFICANT CHANGE UP (ref 0–0.5)
EOSINOPHIL NFR BLD AUTO: 2.4 % — SIGNIFICANT CHANGE UP (ref 0–6)
GI PCR PANEL: SIGNIFICANT CHANGE UP
GLUCOSE SERPL-MCNC: 115 MG/DL — HIGH (ref 70–99)
HCT VFR BLD CALC: 34.2 % — LOW (ref 34.5–45)
HGB BLD-MCNC: 11.5 G/DL — SIGNIFICANT CHANGE UP (ref 11.5–15.5)
IMM GRANULOCYTES NFR BLD AUTO: 1.5 % — HIGH (ref 0–0.9)
LYMPHOCYTES # BLD AUTO: 1.34 K/UL — SIGNIFICANT CHANGE UP (ref 1–3.3)
LYMPHOCYTES # BLD AUTO: 22.8 % — SIGNIFICANT CHANGE UP (ref 13–44)
MAGNESIUM SERPL-MCNC: 2.1 MG/DL — SIGNIFICANT CHANGE UP (ref 1.6–2.6)
MCHC RBC-ENTMCNC: 30.3 PG — SIGNIFICANT CHANGE UP (ref 27–34)
MCHC RBC-ENTMCNC: 33.6 GM/DL — SIGNIFICANT CHANGE UP (ref 32–36)
MCV RBC AUTO: 90 FL — SIGNIFICANT CHANGE UP (ref 80–100)
MONOCYTES # BLD AUTO: 0.42 K/UL — SIGNIFICANT CHANGE UP (ref 0–0.9)
MONOCYTES NFR BLD AUTO: 7.2 % — SIGNIFICANT CHANGE UP (ref 2–14)
NEUTROPHILS # BLD AUTO: 3.83 K/UL — SIGNIFICANT CHANGE UP (ref 1.8–7.4)
NEUTROPHILS NFR BLD AUTO: 65.2 % — SIGNIFICANT CHANGE UP (ref 43–77)
NT-PROBNP SERPL-SCNC: 5020 PG/ML — HIGH (ref 0–125)
PHOSPHATE SERPL-MCNC: 2.5 MG/DL — SIGNIFICANT CHANGE UP (ref 2.5–4.5)
PLATELET # BLD AUTO: 287 K/UL — SIGNIFICANT CHANGE UP (ref 150–400)
POTASSIUM SERPL-MCNC: 4.2 MMOL/L — SIGNIFICANT CHANGE UP (ref 3.5–5.3)
POTASSIUM SERPL-SCNC: 4.2 MMOL/L — SIGNIFICANT CHANGE UP (ref 3.5–5.3)
RBC # BLD: 3.8 M/UL — SIGNIFICANT CHANGE UP (ref 3.8–5.2)
RBC # FLD: 14.4 % — SIGNIFICANT CHANGE UP (ref 10.3–14.5)
SODIUM SERPL-SCNC: 141 MMOL/L — SIGNIFICANT CHANGE UP (ref 135–145)
TROPONIN I, HIGH SENSITIVITY RESULT: 126.35 NG/L — HIGH
WBC # BLD: 5.87 K/UL — SIGNIFICANT CHANGE UP (ref 3.8–10.5)
WBC # FLD AUTO: 5.87 K/UL — SIGNIFICANT CHANGE UP (ref 3.8–10.5)

## 2023-07-24 PROCEDURE — 99223 1ST HOSP IP/OBS HIGH 75: CPT

## 2023-07-24 PROCEDURE — 93010 ELECTROCARDIOGRAM REPORT: CPT

## 2023-07-24 PROCEDURE — 93308 TTE F-UP OR LMTD: CPT | Mod: 26

## 2023-07-24 PROCEDURE — 99233 SBSQ HOSP IP/OBS HIGH 50: CPT

## 2023-07-24 RX ORDER — LACTOBACILLUS ACIDOPHILUS 100MM CELL
1 CAPSULE ORAL DAILY
Refills: 0 | Status: DISCONTINUED | OUTPATIENT
Start: 2023-07-24 | End: 2023-07-26

## 2023-07-24 RX ORDER — FLUTICASONE PROPIONATE 50 MCG
1 SPRAY, SUSPENSION NASAL ONCE
Refills: 0 | Status: COMPLETED | OUTPATIENT
Start: 2023-07-24 | End: 2023-07-24

## 2023-07-24 RX ADMIN — LETROZOLE 2.5 MILLIGRAM(S): 2.5 TABLET, FILM COATED ORAL at 10:30

## 2023-07-24 RX ADMIN — SACUBITRIL AND VALSARTAN 1 TABLET(S): 24; 26 TABLET, FILM COATED ORAL at 21:46

## 2023-07-24 RX ADMIN — SACUBITRIL AND VALSARTAN 1 TABLET(S): 24; 26 TABLET, FILM COATED ORAL at 10:29

## 2023-07-24 RX ADMIN — ENOXAPARIN SODIUM 100 MILLIGRAM(S): 100 INJECTION SUBCUTANEOUS at 10:29

## 2023-07-24 RX ADMIN — ATORVASTATIN CALCIUM 40 MILLIGRAM(S): 80 TABLET, FILM COATED ORAL at 21:46

## 2023-07-24 RX ADMIN — Medication 81 MILLIGRAM(S): at 10:29

## 2023-07-24 RX ADMIN — AMIODARONE HYDROCHLORIDE 400 MILLIGRAM(S): 400 TABLET ORAL at 06:47

## 2023-07-24 RX ADMIN — Medication 150 MILLIGRAM(S): at 10:29

## 2023-07-24 RX ADMIN — SPIRONOLACTONE 25 MILLIGRAM(S): 25 TABLET, FILM COATED ORAL at 10:28

## 2023-07-24 RX ADMIN — Medication 1 TABLET(S): at 21:45

## 2023-07-24 RX ADMIN — AMIODARONE HYDROCHLORIDE 400 MILLIGRAM(S): 400 TABLET ORAL at 13:47

## 2023-07-24 RX ADMIN — Medication 1 SPRAY(S): at 12:10

## 2023-07-24 RX ADMIN — AMIODARONE HYDROCHLORIDE 400 MILLIGRAM(S): 400 TABLET ORAL at 21:45

## 2023-07-24 RX ADMIN — ENOXAPARIN SODIUM 100 MILLIGRAM(S): 100 INJECTION SUBCUTANEOUS at 21:47

## 2023-07-24 NOTE — CONSULT NOTE ADULT - REASON FOR ADMISSION
elevated troponin  acute rhabdomyolysis   falls

## 2023-07-24 NOTE — CONSULT NOTE ADULT - ASSESSMENT
69 y/o F 68 year old woman with hx of HTN, AF s/p ablation, R breast CA 2017 on letrozole, R foot neuropathy, osteoarthritis depression s/p mechanical fall 7/13 admitted for management of fall, elevated Trop, Rhado, PNA found to have PE/DVT on day 6 of hospitalization.     PE/DVT  - intermediate high risk given history of cancer and anemia  -patient symptomology consistent with PE vs HF  - follow up ECHO for RV strain pending   - on lovenox BID for anticoagulation   - patient educated  on CTA findings, RV strain, biochemical markers and further testing and various treatment modalities of managing pulmonary embolisms   - titrate O2 requirements to maintian O2 saturation > 90%  -  monitor for close signs of decompensation   -    69 y/o F 68 year old woman with hx of HTN, AF s/p ablation, R breast CA 2017 on letrozole, R foot neuropathy, osteoarthritis depression s/p mechanical fall 7/13 admitted for management of fall, elevated Trop, Rhado, PNA found to have PE/DVT on day 6 of hospitalization.     PE/DVT  - intermediate high risk given history of cancer and anemia  -patient symptomology consistent with PE vs HF  - follow up ECHO for RV strain pending   -currently on lovenox BID for anticoagulation   - patient educated  on CTA findings, RV strain, biochemical markers and further testing and various treatment modalities of managing pulmonary embolisms   - titrate O2 requirements to maintian O2 saturation > 90%  -  monitor for close signs of decompensation   - vascular cardiology to follow    69 y/o F 68 year old woman with hx of HTN, AF s/p ablation, R breast CA 2017 on letrozole, R foot neuropathy, osteoarthritis depression s/p mechanical fall 7/13 admitted for management of fall, elevated Trop, Rhado, PNA found to have PE/DVT on day 6 of hospitalization.     PE/DVT  - intermediate high risk given history of cancer and anemia  -patient symptomology consistent with PE vs HF  - follow up ECHO for RV strain pending   -agree with lovenox BID for anticoagulation   - patient educated  on CTA findings, RV strain, biochemical markers and further testing and various treatment modalities of managing pulmonary embolisms   - titrate O2 requirements to maintian O2 saturation > 90%  -  monitor for close signs of decompensation   - vascular cardiology to follow    67 y/o F  with hx of HTN, AF s/p ablation, R breast CA 2017 on letrozole, R foot neuropathy, osteoarthritis depression s/p mechanical fall 7/13 admitted for management of fall, elevated Trop, Rhado, PNA found to have PE/DVT on day 6 of hospitalization.     PE/DVT  - intermediate high risk given history of cancer and anemia  -patient symptomology consistent with PE vs HF  - follow up ECHO for RV strain pending   -agree with lovenox BID for anticoagulation   - patient educated  on CTA findings, RV strain, biochemical markers and further testing and various treatment modalities of managing pulmonary embolisms   - titrate O2 requirements to maintian O2 saturation > 90%  -  monitor for close signs of decompensation   - vascular cardiology to follow

## 2023-07-24 NOTE — PROGRESS NOTE ADULT - ASSESSMENT
68 year old woman with hx of HTN, AF s/p ablation, R breast CA 2017 on letrozole, R foot neuropathy, osteoarthritis depression, here for further evaluation after a fall, trouble getting up.     Fall  Orthostatics negative. Appears to have been a mechanical fall. Seen by PT, recommended [facility]  - Continue restorative PT sessions  - OOB to chair daily    Rhabdomyolysis  CPK downtrended with IVF hydration  - Encourage Po hydration at this point    Type II MI  Troponin elevated to ~15,000 now down-trending. TTE done, LVEF 45% w/ diffuse hypokinesis. L heart cath done thereafter. No occlusive CAD. Appreciate input from Cardiology  - Continue medical management w/ aspirin, statin  - No longer on metoprolol as she's had intermittent hypotension    HFmrEF  Chronicity a bit unclear. Could be acute, related to type II MI. Appreciate input from Cardiology. She was on regimen of metoprolol succinate, Entresto and spironolactone, now off the metoprolol due to intermittent hypotension and SVT episodes for which Cardiology switched metoprolol to amiodarone.   - Continue Entresto, spironolactone  - Is and Os, daily wt  - F/u with Cardiology    Episodes of SVT  Possible due to new diagnosis of B/L PE, see below. Continued to have SVT episodes despite metoprolol. Now on amiodarone in place of metoprolol as per Cardiology. No further SVT  - Continue amiodarone  - Continue to monitor on tele    Acute respiratory failure with hypoxia  While she was thought to have pneumonia upon presentation and treated as such, completing a 3-day course of azithromycin and 5-day course of ceftriaxone, she did not have hypoxia in the early stages of her admission. Hypoxia developed after she continued to receive IV fluid hydration for her other presenting issue, that is, rhabdomyolysis in setting of her fall, immobility on floor for few hrs. She received IV Lasix without improvement. CT was performed and showed decreased L lung opacities, small L pleural effusion. on 7/22, she was still hypoxic, requiring up to 6L/min O2, also newly tachycardic to 100s-110s so she was started on full dose Lovenox empirically for possible PE, underwent CTA that confirmed B/L PE, see below.  - Continue O2 supplementation as needed, wean as tolerated, goal SPO2 92% or greater on room air  - Continue management of underlying issues    Bilateral pulmonary emboli  As noted on CTA chest today. Possible R heart strain as well. Troponin and BNP elevated but downtrending when compared to earlier this admission. Requested ICU team to obtain POCUS echo to r/o RV strain as echo lab closed. However, ICU POCUS broken. Follow up echo performed today. Report pending. Echo reviewed by PE Team. RV strain noted by hemodynamics WNL, oxygenation much improved and patient feeling overall improved. Deferring intervention at this time.   - Continue full dose AC with Lovenox 100mg subcut q12 (1mg/kr subcut q12)  - Awaiting final TTE report  - Close monitoring, PE Team following    Severe sepsis w lactic acidosis due to community acquired pneumonia, unable to rule out Gram-negative organism, present upon admission  Resolved. Lactate improved. Blood cultures negative. Breathing comfortably but requiring O2 supplementation, possible due to volume overload at this point, rather than pneumonia. S/p azithromycin x 3d. S/p ceftriaxone x 5d.  - Continue to monitor    Hepatic steatosis with mild transaminitis  LFTs gradually improving  - Continue to monitor as outpatient    Hx breast CA  Onc consult appreciated. Sclerotic focus on R iliac, likely benign but given breast cancer hx, would pursue NM bone scan  - NM bone scan, can be pursued as outpatient    R ovarian cyst  As noted on imaging  - Outpatient follow up    Loose stools  No leukocytosis. She was on antibiotics recently including ceftriaxone.   - Continue to monitor, if loose stools persist, place on contact isolation and order Cdiff PCR to r/o Cdiff infection    Hypokalemia  In setting of loose stools. Improved with potassium supplementation. Mg normal.  - Continue to monitor    Physical deconditioning and debility  PT eval appreciated, recommending [facility]  - OOB to chair daily   - Continue restorative PT sessions        DVT px: Now on full dose AC for B/L PEs  Code status: Full  Dispo: Anticipate DC to [facility] once clinically improved and inpatient workup complete, ~48 hrs

## 2023-07-24 NOTE — CONSULT NOTE ADULT - SUBJECTIVE AND OBJECTIVE BOX
[facility] Vascular Cardiology Team Note                                                              [facility] /[facility] Faculty Practice                                              Office:  [address] Cardiac Clinic 1st Floor - [facility] [address]                                                                     Telephone: [phone]. Fax:[phone]                                                                  VASCULAR  CARDIOLOGY CONSULTATION NOTE     Patient is a 68y old  Female who presents with a chief complaint of Fall  Rhabdomyolysis  Elevated troponin (23 Jul 2023 17:31)    HISTORY OF PRESENT ILLNESS:  HPI:  68F w AF s/p ablation, R breast CA 2017 on letrozole (Oncologist stopped Xarelto 1 year ago), depression, HTN, neuropathy R foot, and OA, BIBEMS s/p fall on 7/13 outside in her yard without headstrike or LOC, she was on the ground for 2+ hrs before being able to get up. Patient originally admitted to medicine for further management of rhabdomyolysis, NSTEMI ([facility] with only non-obstructive CAD on 7/20), and progressively worsening hypoxia thought to be iso KARELY PNA and acute on chronic CHF however despite diuresis and abx patient's hypoxia continued to progress to 6L NC requirement. Sent for CTA Chest today, found to have bilateral PEs with possible RHS. Started on full dose Lovenox. MICU consulted for PE with possible RHS.   Vascular cardiology consulted for PE/ DVT ? RV strain     7/24/23 Patient seen this AM; hospital day 8. PE diagnosed 7/22 after episode of hypoxia. s/p 3 day of abx for PNA,. [facility] non obs CAD  Follow up ECHO pending post PE dx. O2 on 4L this AM, room air o2 sat while speaking 89%. Amiodarone started for SVT/NSVT per cardiology . Patient reporting mild SOB when exertion, said her SOB started after using the incentive spirometer. 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 and a Armindan (16 Jul 2023 22:06)        Allergies    red dye (Other)  Cipro (Other)    Intolerances    spinach (Vomiting)  	    MEDICATIONS:  MEDICATIONS  (STANDING):  aMIOdarone    Tablet   Oral   aMIOdarone    Tablet 400 milliGRAM(s) Oral every 8 hours  aspirin  chewable 81 milliGRAM(s) Oral daily  atorvastatin 40 milliGRAM(s) Oral at bedtime  enoxaparin Injectable 100 milliGRAM(s) SubCutaneous every 12 hours  letrozole 2.5 milliGRAM(s) Oral daily  sacubitril 24 mG/valsartan 26 mG 1 Tablet(s) Oral two times a day  spironolactone 25 milliGRAM(s) Oral daily  venlafaxine XR. 150 milliGRAM(s) Oral daily    MEDICATIONS  (PRN):  acetaminophen     Tablet .. 650 milliGRAM(s) Oral every 6 hours PRN Mild Pain (1 - 3)  aluminum hydroxide/magnesium hydroxide/simethicone Suspension 30 milliLiter(s) Oral every 4 hours PRN Dyspepsia  melatonin 3 milliGRAM(s) Oral at bedtime PRN Insomnia  ondansetron Injectable 4 milliGRAM(s) IV Push every 6 hours PRN Nausea and/or Vomiting      Home Medications:  letrozole 2.5 mg oral tablet: 1 tab(s) orally once a day (16 Jul 2023 15:47)  metoprolol succinate 25 mg oral tablet, extended release: 1 tab(s) orally once a day ***take with 50 mg total 75 mg*** (16 Jul 2023 15:46)  metoprolol succinate 50 mg oral tablet, extended release: 1 tab(s) orally once a day ***take with 25mg total 75 mg (16 Jul 2023 15:46)  ondansetron 8 mg oral tablet: 1 tab(s) orally once a day as needed for (16 Jul 2023 15:47)  venlafaxine 150 mg oral capsule, extended release: 1 cap(s) orally once a day (16 Jul 2023 15:46)      PAST MEDICAL & SURGICAL HISTORY:  Breast lump benign  Malignant neoplasm of right female breast, unspecified site of breast  Breast pain, right  Breast skin changes right breast  Essential hypertension  SVT (supraventricular tachycardia)  Microscopic hematuria  Osteoarthritis knees  Rosacea  H/O cardiac radiofrequency ablation  History of lumpectomy of right breast benign -2003  H/O right breast biopsy 2017  S/P colonoscopy            FAMILY HISTORY:      SOCIAL HISTORY:      REVIEW OF SYSTEMS:  CONSTITUTIONAL: No fever, weight loss, chills, shakes, or fatigue  EYES: No eye pain, visual disturbances, or discharge  ENMT:  No difficulty hearing, tinnitus, vertigo; No sinus or throat pain  NECK: No pain or stiffness  RESPIRATORY: + shortness of breath  CARDIOVASCULAR: No chest pain, dyspnea, palpitations, dizziness, syncope, paroxysmal nocturnal dyspnea, orthopnea, or arm or leg swelling  GASTROINTESTINAL: No abdominal  or epigastric pain, nausea, vomiting, hematemesis, diarrhea, constipation, melena or bright red blood.  GENITOURINARY: No dysuria, nocturia, hematuria, or urinary incontinence  NEUROLOGICAL: No headaches, memory loss, slurred speech, limb weakness, loss of strength, numbness, or tremors  SKIN: No itching, burning, rashes, or lesions   MUSCULOSKELETAL: No joint pain or swelling, muscle, back, or extremity pain  PSYCHIATRIC: No depression, anxiety, or difficulty sleeping      PHYSICAL EXAM:  Vital Signs Last 24 Hrs  T(C): 36.5 (24 Jul 2023 07:40), Max: 36.8 (23 Jul 2023 21:38)  T(F): 97.7 (24 Jul 2023 07:40), Max: 98.3 (23 Jul 2023 21:38)  HR: 87 (24 Jul 2023 07:40) (87 - 95)  BP: 121/51 (24 Jul 2023 07:40) (90/65 - 126/45)  BP(mean): --  RR: 18 (24 Jul 2023 07:40) (18 - 18)  SpO2: 100% (24 Jul 2023 07:40) (95% - 100%)    Parameters below as of 24 Jul 2023 07:40  Patient On (Oxygen Delivery Method): nasal cannula  O2 Flow (L/min): 4      GENERAL: NAD, AAOx3  CHEST/LUNG: Clear to auscultation bilaterally; No wheeze  HEART: s1 s2 Regular rate and  tachy rhythm; No murmurs, rubs, or gallops  ABDOMEN: Soft, Nontender, Nondistended; Bowel sounds present X 4 quadrants   EXTREMITIES:  2+ Peripheral Pulses, No clubbing, cyanosis, or edema  SKIN: No rashes or lesions,  b/l LE not red, cool to touch,  no open skin no drainage  NEURO: nonfocal CN/motor/sensory/reflexes  Psych: normal affect and behavior, calm and cooperative         LABS                        11.5   5.87  )-----------( 287      ( 24 Jul 2023 06:36 )             34.2     07-24    141  |  112<H>  |  17  ----------------------------<  115<H>  4.2   |  23  |  0.73    Ca    8.0<L>      24 Jul 2023 06:36  Phos  2.5     07-24  Mg     2.1     07-24    TPro  6.6  /  Alb  2.6<L>  /  TBili  0.7  /  DBili  x   /  AST  97<H>  /  ALT  151<H>  /  AlkPhos  88  07-22    Troponin I, High Sensitivity Result: 54104.33: --> 9500.45 --> 8752.70--> 412.50 --> 126.35    Pro-Brain Natriuretic Peptide (07.18.23 @ 07:21)   Pro-Brain Natriuretic Peptide: 5290---> 7752 --> 5020    Creatine Kinase, Serum in AM (07.19.23 @ 07:02)   Creatine Kinase, Serum: 4321 U/L --> 1593        Urinalysis Basic - ( 24 Jul 2023 06:36 )    Color: x / Appearance: x / SG: x / pH: x  Gluc: 115 mg/dL / Ketone: x  / Bili: x / Urobili: x   Blood: x / Protein: x / Nitrite: x   Leuk Esterase: x / RBC: x / WBC x   Sq Epi: x / Non Sq Epi: x / Bacteria: x          RADIOLOGY :    CTA- chest < from: CT Angio Chest PE Protocol w/ IV Cont (07.22.23 @ 16:57) >  IMPRESSION:    1.  Bilateral pulmonary emboli.  2.  The right atrium right ventricle are enlarged relative to the left   ventricle which could occur in the clinical setting of right heart strain.  3.  The findings were discussed with and read back verification obtained   on 7/22/2023 at 1710 hours from Dr. Vickers.    --- End of Report ---    PHILIPPE COLLADO MD; Attending Radiologist  This document has been electronically signed. Jul 22 2023  5:18PM    < end of copied text >      US Duplex  - < from: US Duplex Venous Lower Ext Complete, Bilateral (07.23.23 @ 18:51) >  IMPRESSION:  Acute left lower extremity deep venous thrombosis, above and below the   knee.    No evidence of right lower extremity deep venous cirrhosis.    < end of copied text >        ECHO     7/24/23 follow up pending       < from: TTE Echo Complete w/o Contrast w/ Doppler (07.17.23 @ 13:11) >     Findings     Mitral Valve   There is calcification of both mitral valve leaflets. The leaflet opening   is normal.   Mild (1+) mitral regurgitation is present.   EA reversal of the mitral inflow consistent with reduced compliance of   the  left ventricle.     Aortic Valve   The aortic valve is well visualized, appears mildly calcified. Valve   opening seems to be normal.     Tricuspid Valve   Normal appearing tricuspid valve structure and function.   Trace tricuspid valve regurgitation is present.     Pulmonic Valve   Normal appearing pulmonic valve structure.  Trace to Mild pulmonic valvular regurgitation is present.     Left Atrium   The left atrium is mildly dilated.     Left Ventricle   Estimated left ventricular ejection fraction is 45 %.   The left ventricle is normal in size and wall thickness.   Mild, diffuse hypokinesis of the left ventricle is present.     Right Atrium   Normal appearing right atrium.     Right Ventricle   Normal appearing right ventricle structure and function.     Pericardial Effusion   No evidence of pericardial effusion.     Pleural Effusion   No evidence of pleural effusion.     Miscellaneous   The IVC was not visualized.     Impression     Signature     ----------------------------------------------------------------   Electronically signed by Alida Sexton MD(Interpreting   physician) on 07/18/2023 06:40 AM   ----------------------------------------------------------------    < end of copied text >    [facility] 7/20/23  Operative Findings:  · Operative Findings	non-obstructive CAD      CXR < from: Xray Chest 1 View- PORTABLE-Urgent (Xray Chest 1 View- PORTABLE-Urgent .) (07.19.23 @ 22:28) >  IMPRESSION:   LEFT upper lobe airspace consolidation..    --- End of Report ---    < end of copied text >      EKG 7/23/23 SR PAC rate 88    REITE Bleeding Risk:   [ ] Recent Major Bleeding (2pt)  [ ] Creatinine > 1.2 mg/dL ( 1.5pt)  [x ] Anemia (<13 g/dL M, < 12 g/dL F) (1.5 pt)  [ x] Malignancy History (1pt)  [ x] Clinically - overt PE (1pt)  [ ] Age > 75 (1pt)  Total: 3.5    PESI score: 140 class V  Age >80 [ ], History of Cancer [x ], Hx of chronic cardiopulmonary disease [ ], Heart rate > 110 [ ], Systolic BP <100 [ ], SpO2 <90% [ ]                                                                     [facility] Vascular Cardiology Team Note                                                              [facility] /[facility] Faculty Practice                                              Office:  [address] Cardiac Clinic 1st Floor - [facility] [address]                                                                     Telephone: [phone]. Fax:[phone]                                                                  VASCULAR  CARDIOLOGY CONSULTATION NOTE     Patient is a 68y old  Female who presents with a chief complaint of Fall  Rhabdomyolysis  Elevated troponin (23 Jul 2023 17:31)    HISTORY OF PRESENT ILLNESS:  HPI:  68F w AF s/p ablation, R breast CA 2017 on letrozole (Oncologist stopped Xarelto 1 year ago), depression, HTN, neuropathy R foot, and OA, BIBEMS s/p fall on 7/13 outside in her yard without headstrike or LOC, she was on the ground for 2+ hrs before being able to get up. Patient originally admitted to medicine for further management of rhabdomyolysis, NSTEMI ([facility] with only non-obstructive CAD on 7/20), and progressively worsening hypoxia thought to be iso KARELY PNA and acute on chronic CHF however despite diuresis and abx patient's hypoxia continued to progress to 6L NC requirement. Sent for CTA Chest today, found to have bilateral PEs with possible RHS. Started on full dose Lovenox. MICU consulted for PE with possible RHS.   Vascular cardiology consulted for PE/ DVT ? RV strain     7/24/23 Patient seen this AM; hospital day 8. PE diagnosed 7/22 after episode of hypoxia. s/p 3 day of abx for PNA,. [facility] non obs CAD  Follow up ECHO pending post PE dx. O2 on 4L this AM, room air o2 sat while speaking 89%. Amiodarone started for SVT/NSVT per cardiology . Patient reporting mild SOB when exertion, said her SOB started after using the incentive spirometer. 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 and a Armindan (16 Jul 2023 22:06)        Allergies    red dye (Other)  Cipro (Other)    Intolerances    spinach (Vomiting)  	    MEDICATIONS:  MEDICATIONS  (STANDING):  aMIOdarone    Tablet   Oral   aMIOdarone    Tablet 400 milliGRAM(s) Oral every 8 hours  aspirin  chewable 81 milliGRAM(s) Oral daily  atorvastatin 40 milliGRAM(s) Oral at bedtime  enoxaparin Injectable 100 milliGRAM(s) SubCutaneous every 12 hours  letrozole 2.5 milliGRAM(s) Oral daily  sacubitril 24 mG/valsartan 26 mG 1 Tablet(s) Oral two times a day  spironolactone 25 milliGRAM(s) Oral daily  venlafaxine XR. 150 milliGRAM(s) Oral daily    MEDICATIONS  (PRN):  acetaminophen     Tablet .. 650 milliGRAM(s) Oral every 6 hours PRN Mild Pain (1 - 3)  aluminum hydroxide/magnesium hydroxide/simethicone Suspension 30 milliLiter(s) Oral every 4 hours PRN Dyspepsia  melatonin 3 milliGRAM(s) Oral at bedtime PRN Insomnia  ondansetron Injectable 4 milliGRAM(s) IV Push every 6 hours PRN Nausea and/or Vomiting      Home Medications:  letrozole 2.5 mg oral tablet: 1 tab(s) orally once a day (16 Jul 2023 15:47)  metoprolol succinate 25 mg oral tablet, extended release: 1 tab(s) orally once a day ***take with 50 mg total 75 mg*** (16 Jul 2023 15:46)  metoprolol succinate 50 mg oral tablet, extended release: 1 tab(s) orally once a day ***take with 25mg total 75 mg (16 Jul 2023 15:46)  ondansetron 8 mg oral tablet: 1 tab(s) orally once a day as needed for (16 Jul 2023 15:47)  venlafaxine 150 mg oral capsule, extended release: 1 cap(s) orally once a day (16 Jul 2023 15:46)      PAST MEDICAL & SURGICAL HISTORY:  Breast lump benign  Malignant neoplasm of right female breast, unspecified site of breast  Breast pain, right  Breast skin changes right breast  Essential hypertension  SVT (supraventricular tachycardia)  Microscopic hematuria  Osteoarthritis knees  Rosacea  H/O cardiac radiofrequency ablation  History of lumpectomy of right breast benign -2003  H/O right breast biopsy 2017  S/P colonoscopy            FAMILY HISTORY:      SOCIAL HISTORY:      REVIEW OF SYSTEMS:  CONSTITUTIONAL: No fever, weight loss, chills, shakes, or fatigue  EYES: No eye pain, visual disturbances, or discharge  ENMT:  No difficulty hearing, tinnitus, vertigo; No sinus or throat pain  NECK: No pain or stiffness  RESPIRATORY: + shortness of breath  CARDIOVASCULAR: No chest pain, dyspnea, palpitations, dizziness, syncope, paroxysmal nocturnal dyspnea, orthopnea, or arm or leg swelling  GASTROINTESTINAL: No abdominal  or epigastric pain, nausea, vomiting, hematemesis, diarrhea, constipation, melena or bright red blood.  GENITOURINARY: No dysuria, nocturia, hematuria, or urinary incontinence  NEUROLOGICAL: No headaches, memory loss, slurred speech, limb weakness, loss of strength, numbness, or tremors  SKIN: No itching, burning, rashes, or lesions   MUSCULOSKELETAL: No joint pain or swelling, muscle, back, or extremity pain  PSYCHIATRIC: No depression, anxiety, or difficulty sleeping      PHYSICAL EXAM:  Vital Signs Last 24 Hrs  T(C): 36.5 (24 Jul 2023 07:40), Max: 36.8 (23 Jul 2023 21:38)  T(F): 97.7 (24 Jul 2023 07:40), Max: 98.3 (23 Jul 2023 21:38)  HR: 87 (24 Jul 2023 07:40) (87 - 95)  BP: 121/51 (24 Jul 2023 07:40) (90/65 - 126/45)  BP(mean): --  RR: 18 (24 Jul 2023 07:40) (18 - 18)  SpO2: 100% (24 Jul 2023 07:40) (95% - 100%)    Parameters below as of 24 Jul 2023 07:40  Patient On (Oxygen Delivery Method): nasal cannula  O2 Flow (L/min): 4      GENERAL: NAD, AAOx3  CHEST/LUNG: Clear to auscultation bilaterally; No wheeze  HEART: s1 s2 Regular rate and  tachy rhythm; No murmurs, rubs, or gallops  ABDOMEN: Soft, Nontender, Nondistended; Bowel sounds present X 4 quadrants   EXTREMITIES:  2+ Peripheral Pulses, No clubbing, cyanosis, or edema  SKIN: No rashes or lesions,  b/l LE not red, cool to touch,  no open skin no drainage  NEURO: nonfocal CN/motor/sensory/reflexes  Psych: normal affect and behavior, calm and cooperative         LABS                        11.5   5.87  )-----------( 287      ( 24 Jul 2023 06:36 )             34.2     07-24    141  |  112<H>  |  17  ----------------------------<  115<H>  4.2   |  23  |  0.73    Ca    8.0<L>      24 Jul 2023 06:36  Phos  2.5     07-24  Mg     2.1     07-24    TPro  6.6  /  Alb  2.6<L>  /  TBili  0.7  /  DBili  x   /  AST  97<H>  /  ALT  151<H>  /  AlkPhos  88  07-22    Troponin I, High Sensitivity Result: 51459.33: --> 9500.45 --> 8752.70--> 412.50 --> 126.35    Pro-Brain Natriuretic Peptide (07.18.23 @ 07:21)   Pro-Brain Natriuretic Peptide: 5290---> 7752 --> 5020    Creatine Kinase, Serum in AM (07.19.23 @ 07:02)   Creatine Kinase, Serum: 4321 U/L --> 1593        Urinalysis Basic - ( 24 Jul 2023 06:36 )    Color: x / Appearance: x / SG: x / pH: x  Gluc: 115 mg/dL / Ketone: x  / Bili: x / Urobili: x   Blood: x / Protein: x / Nitrite: x   Leuk Esterase: x / RBC: x / WBC x   Sq Epi: x / Non Sq Epi: x / Bacteria: x          RADIOLOGY :    CTA- chest < from: CT Angio Chest PE Protocol w/ IV Cont (07.22.23 @ 16:57) >  IMPRESSION:    1.  Bilateral pulmonary emboli.  2.  The right atrium right ventricle are enlarged relative to the left   ventricle which could occur in the clinical setting of right heart strain.  3.  The findings were discussed with and read back verification obtained   on 7/22/2023 at 1710 hours from Dr. Vickers.    --- End of Report ---    PHILIPPE COLLADO MD; Attending Radiologist  This document has been electronically signed. Jul 22 2023  5:18PM    < end of copied text >      US Duplex  - < from: US Duplex Venous Lower Ext Complete, Bilateral (07.23.23 @ 18:51) >  IMPRESSION:  Acute left lower extremity deep venous thrombosis, above and below the   knee.    No evidence of right lower extremity deep venous cirrhosis.    < end of copied text >        ECHO     7/24/23 follow up pending       < from: TTE Echo Complete w/o Contrast w/ Doppler (07.17.23 @ 13:11) >     Findings     Mitral Valve   There is calcification of both mitral valve leaflets. The leaflet opening   is normal.   Mild (1+) mitral regurgitation is present.   EA reversal of the mitral inflow consistent with reduced compliance of   the  left ventricle.     Aortic Valve   The aortic valve is well visualized, appears mildly calcified. Valve   opening seems to be normal.     Tricuspid Valve   Normal appearing tricuspid valve structure and function.   Trace tricuspid valve regurgitation is present.     Pulmonic Valve   Normal appearing pulmonic valve structure.  Trace to Mild pulmonic valvular regurgitation is present.     Left Atrium   The left atrium is mildly dilated.     Left Ventricle   Estimated left ventricular ejection fraction is 45 %.   The left ventricle is normal in size and wall thickness.   Mild, diffuse hypokinesis of the left ventricle is present.     Right Atrium   Normal appearing right atrium.     Right Ventricle   Normal appearing right ventricle structure and function.     Pericardial Effusion   No evidence of pericardial effusion.     Pleural Effusion   No evidence of pleural effusion.     Miscellaneous   The IVC was not visualized.     Impression     Signature     ----------------------------------------------------------------   Electronically signed by Alida Sexton MD(Interpreting   physician) on 07/18/2023 06:40 AM   ----------------------------------------------------------------    < end of copied text >    [facility] 7/20/23  Operative Findings:  · Operative Findings	non-obstructive CAD      CXR < from: Xray Chest 1 View- PORTABLE-Urgent (Xray Chest 1 View- PORTABLE-Urgent .) (07.19.23 @ 22:28) >  IMPRESSION:   LEFT upper lobe airspace consolidation..    --- End of Report ---    < end of copied text >      EKG 7/23/23 SR PAC rate 88    REITE Bleeding Risk:   [ ] Recent Major Bleeding (2pt)  [ ] Creatinine > 1.2 mg/dL ( 1.5pt)  [x ] Anemia (<13 g/dL M, < 12 g/dL F) (1.5 pt)  [ x] Malignancy History (1pt)  [ x] Clinically - overt PE (1pt)  [ ] Age > 75 (1pt)  Total: 3.5    PESI score: 140 class V  Age >80 [ ], History of Cancer [x ], Hx of chronic cardiopulmonary disease [ ], Heart rate > 110 [ x ], Systolic BP <100 [ ], SpO2 <90% [x ]                                                                     [facility] Vascular Cardiology Team Note                                                              [facility] /[facility] Faculty Practice                                              Office:  [address] Cardiac Clinic 1st Floor - [facility] [address]                                                                     Telephone: [phone]. Fax:[phone]                                                                  VASCULAR  CARDIOLOGY CONSULTATION NOTE     Patient is a 68y old  Female who presents with a chief complaint of Fall  Rhabdomyolysis  Elevated troponin (23 Jul 2023 17:31)    HISTORY OF PRESENT ILLNESS:  HPI:  68F w AF s/p ablation, R breast CA 2017 on letrozole (Oncologist stopped Xarelto 1 year ago), depression, HTN, neuropathy R foot, and OA, BIBEMS s/p fall on 7/13 outside in her yard without headstrike or LOC, she was on the ground for 2+ hrs before being able to get up. Patient originally admitted to medicine for further management of rhabdomyolysis, NSTEMI ([facility] with only non-obstructive CAD on 7/20), and progressively worsening hypoxia thought to be iso KARELY PNA and acute on chronic CHF however despite diuresis and abx patient's hypoxia continued to progress to 6L NC requirement. Sent for CTA Chest today, found to have bilateral PEs with possible RHS. Started on full dose Lovenox. MICU consulted for PE with possible RHS.   Vascular cardiology consulted for PE/ DVT ? RV strain     7/24/23 Patient seen this AM; hospital day 8. PE diagnosed 7/22 after episode of hypoxia. s/p 3 day of abx for PNA,. [facility] non obs CAD  Follow up ECHO pending post PE dx. O2 on 4L this AM, room air o2 sat while speaking 89%. Amiodarone started for SVT/NSVT per cardiology . Patient reporting mild SOB when exertion, said her SOB started after using the incentive spirometer. reports she does get OOB with assistance, feels deconditioned with ambulating to the bathroom, can't say she feels symptomatic while ambulating given feeling more deconditioned. Trop and BNP continuing to downtrend.     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 and gentry Willett (16 Jul 2023 22:06)        Allergies    red dye (Other)  Cipro (Other)    Intolerances    spinach (Vomiting)  	    MEDICATIONS:  MEDICATIONS  (STANDING):  aMIOdarone    Tablet   Oral   aMIOdarone    Tablet 400 milliGRAM(s) Oral every 8 hours  aspirin  chewable 81 milliGRAM(s) Oral daily  atorvastatin 40 milliGRAM(s) Oral at bedtime  enoxaparin Injectable 100 milliGRAM(s) SubCutaneous every 12 hours  letrozole 2.5 milliGRAM(s) Oral daily  sacubitril 24 mG/valsartan 26 mG 1 Tablet(s) Oral two times a day  spironolactone 25 milliGRAM(s) Oral daily  venlafaxine XR. 150 milliGRAM(s) Oral daily    MEDICATIONS  (PRN):  acetaminophen     Tablet .. 650 milliGRAM(s) Oral every 6 hours PRN Mild Pain (1 - 3)  aluminum hydroxide/magnesium hydroxide/simethicone Suspension 30 milliLiter(s) Oral every 4 hours PRN Dyspepsia  melatonin 3 milliGRAM(s) Oral at bedtime PRN Insomnia  ondansetron Injectable 4 milliGRAM(s) IV Push every 6 hours PRN Nausea and/or Vomiting      Home Medications:  letrozole 2.5 mg oral tablet: 1 tab(s) orally once a day (16 Jul 2023 15:47)  metoprolol succinate 25 mg oral tablet, extended release: 1 tab(s) orally once a day ***take with 50 mg total 75 mg*** (16 Jul 2023 15:46)  metoprolol succinate 50 mg oral tablet, extended release: 1 tab(s) orally once a day ***take with 25mg total 75 mg (16 Jul 2023 15:46)  ondansetron 8 mg oral tablet: 1 tab(s) orally once a day as needed for (16 Jul 2023 15:47)  venlafaxine 150 mg oral capsule, extended release: 1 cap(s) orally once a day (16 Jul 2023 15:46)      PAST MEDICAL & SURGICAL HISTORY:  Breast lump benign  Malignant neoplasm of right female breast, unspecified site of breast  Breast pain, right  Breast skin changes right breast  Essential hypertension  SVT (supraventricular tachycardia)  Microscopic hematuria  Osteoarthritis knees  Rosacea  H/O cardiac radiofrequency ablation  History of lumpectomy of right breast benign -2003  H/O right breast biopsy 2017  S/P colonoscopy            FAMILY HISTORY:      SOCIAL HISTORY:      REVIEW OF SYSTEMS:  CONSTITUTIONAL: No fever, weight loss, chills, shakes, or fatigue  EYES: No eye pain, visual disturbances, or discharge  ENMT:  No difficulty hearing, tinnitus, vertigo; No sinus or throat pain  NECK: No pain or stiffness  RESPIRATORY: + shortness of breath  CARDIOVASCULAR: No chest pain, dyspnea, palpitations, dizziness, syncope, paroxysmal nocturnal dyspnea, orthopnea, or arm or leg swelling  GASTROINTESTINAL: No abdominal  or epigastric pain, nausea, vomiting, hematemesis, diarrhea, constipation, melena or bright red blood.  GENITOURINARY: No dysuria, nocturia, hematuria, or urinary incontinence  NEUROLOGICAL: No headaches, memory loss, slurred speech, limb weakness, loss of strength, numbness, or tremors  SKIN: No itching, burning, rashes, or lesions   MUSCULOSKELETAL: No joint pain or swelling, muscle, back, or extremity pain  PSYCHIATRIC: No depression, anxiety, or difficulty sleeping      PHYSICAL EXAM:  Vital Signs Last 24 Hrs  T(C): 36.5 (24 Jul 2023 07:40), Max: 36.8 (23 Jul 2023 21:38)  T(F): 97.7 (24 Jul 2023 07:40), Max: 98.3 (23 Jul 2023 21:38)  HR: 87 (24 Jul 2023 07:40) (87 - 95)  BP: 121/51 (24 Jul 2023 07:40) (90/65 - 126/45)  BP(mean): --  RR: 18 (24 Jul 2023 07:40) (18 - 18)  SpO2: 100% (24 Jul 2023 07:40) (95% - 100%)    Parameters below as of 24 Jul 2023 07:40  Patient On (Oxygen Delivery Method): nasal cannula  O2 Flow (L/min): 4      GENERAL: NAD, AAOx3  CHEST/LUNG: Clear to auscultation bilaterally; No wheeze  HEART: s1 s2 Regular rate and  tachy rhythm; No murmurs, rubs, or gallops  ABDOMEN: Soft, Nontender, Nondistended; Bowel sounds present X 4 quadrants   EXTREMITIES:  2+ Peripheral Pulses, No clubbing, cyanosis, or edema  SKIN: No rashes or lesions,  b/l LE not red, cool to touch,  no open skin no drainage  NEURO: nonfocal CN/motor/sensory/reflexes  Psych: normal affect and behavior, calm and cooperative         LABS                        11.5   5.87  )-----------( 287      ( 24 Jul 2023 06:36 )             34.2     07-24    141  |  112<H>  |  17  ----------------------------<  115<H>  4.2   |  23  |  0.73    Ca    8.0<L>      24 Jul 2023 06:36  Phos  2.5     07-24  Mg     2.1     07-24    TPro  6.6  /  Alb  2.6<L>  /  TBili  0.7  /  DBili  x   /  AST  97<H>  /  ALT  151<H>  /  AlkPhos  88  07-22    Troponin I, High Sensitivity Result: 78995.33: --> 9500.45 --> 8752.70--> 412.50 --> 126.35    Pro-Brain Natriuretic Peptide (07.18.23 @ 07:21)   Pro-Brain Natriuretic Peptide: 5290---> 7752 --> 5020    Creatine Kinase, Serum in AM (07.19.23 @ 07:02)   Creatine Kinase, Serum: 4321 U/L --> 1593        Urinalysis Basic - ( 24 Jul 2023 06:36 )    Color: x / Appearance: x / SG: x / pH: x  Gluc: 115 mg/dL / Ketone: x  / Bili: x / Urobili: x   Blood: x / Protein: x / Nitrite: x   Leuk Esterase: x / RBC: x / WBC x   Sq Epi: x / Non Sq Epi: x / Bacteria: x          RADIOLOGY :    CTA- chest < from: CT Angio Chest PE Protocol w/ IV Cont (07.22.23 @ 16:57) >  IMPRESSION:    1.  Bilateral pulmonary emboli.  2.  The right atrium right ventricle are enlarged relative to the left   ventricle which could occur in the clinical setting of right heart strain.  3.  The findings were discussed with and read back verification obtained   on 7/22/2023 at 1710 hours from Dr. Vickers.    --- End of Report ---    PHILIPPE COLLADO MD; Attending Radiologist  This document has been electronically signed. Jul 22 2023  5:18PM    < end of copied text >      US Duplex  - < from: US Duplex Venous Lower Ext Complete, Bilateral (07.23.23 @ 18:51) >  IMPRESSION:  Acute left lower extremity deep venous thrombosis, above and below the   knee.    No evidence of right lower extremity deep venous cirrhosis.    < end of copied text >        ECHO     7/24/23 follow up pending       < from: TTE Echo Complete w/o Contrast w/ Doppler (07.17.23 @ 13:11) >     Findings     Mitral Valve   There is calcification of both mitral valve leaflets. The leaflet opening   is normal.   Mild (1+) mitral regurgitation is present.   EA reversal of the mitral inflow consistent with reduced compliance of   the  left ventricle.     Aortic Valve   The aortic valve is well visualized, appears mildly calcified. Valve   opening seems to be normal.     Tricuspid Valve   Normal appearing tricuspid valve structure and function.   Trace tricuspid valve regurgitation is present.     Pulmonic Valve   Normal appearing pulmonic valve structure.  Trace to Mild pulmonic valvular regurgitation is present.     Left Atrium   The left atrium is mildly dilated.     Left Ventricle   Estimated left ventricular ejection fraction is 45 %.   The left ventricle is normal in size and wall thickness.   Mild, diffuse hypokinesis of the left ventricle is present.     Right Atrium   Normal appearing right atrium.     Right Ventricle   Normal appearing right ventricle structure and function.     Pericardial Effusion   No evidence of pericardial effusion.     Pleural Effusion   No evidence of pleural effusion.     Miscellaneous   The IVC was not visualized.     Impression     Signature     ----------------------------------------------------------------   Electronically signed by Alida Sexton MD(Interpreting   physician) on 07/18/2023 06:40 AM   ----------------------------------------------------------------    < end of copied text >    [facility] 7/20/23  Operative Findings:  · Operative Findings	non-obstructive CAD      CXR < from: Xray Chest 1 View- PORTABLE-Urgent (Xray Chest 1 View- PORTABLE-Urgent .) (07.19.23 @ 22:28) >  IMPRESSION:   LEFT upper lobe airspace consolidation..    --- End of Report ---    < end of copied text >      EKG 7/23/23 SR PAC rate 88    REITE Bleeding Risk:   [ ] Recent Major Bleeding (2pt)  [ ] Creatinine > 1.2 mg/dL ( 1.5pt)  [x ] Anemia (<13 g/dL M, < 12 g/dL F) (1.5 pt)  [ x] Malignancy History (1pt)  [ x] Clinically - overt PE (1pt)  [ ] Age > 75 (1pt)  Total: 3.5    PESI score: 140 class V  Age >80 [ ], History of Cancer [x ], Hx of chronic cardiopulmonary disease [ ], Heart rate > 110 [ x ], Systolic BP <100 [ ], SpO2 <90% [x ]

## 2023-07-24 NOTE — CONSULT NOTE ADULT - TIME BILLING
sa
Differential diagnosis and plan of care discussed with patient after the evaluation.   Counseling on diet, exercise, and medication compliance was done.  Counseling on smoking and alcohol cessation was offered when appropriate.  Pain assessed and judicious use of narcotics when appropriate was discussed.  Importance of fall prevention discussed.  Advanced care planning was discussed with patient and family.

## 2023-07-24 NOTE — PROGRESS NOTE ADULT - SUBJECTIVE AND OBJECTIVE BOX
Chief Complaint: Fall    Interval Hx: Patient seen and examined earlier today. She reports feeling somewhat improved compared to several days ago as far as respiratory status goes. Now breathing more comfortably. Worked with PT today, transfer training and ambulation training. No chest pain. No pleurisy. No cough. No other complaints aside from generalized weakness she attributes to her prolonging hospitalization. She is having diarrhea. Non bloody. No melena. GI PCR negative.      ROS: Multi system review is comprehensively negative x 10 systems     Vitals:  T(F): 97.7 (24 Jul 2023 07:40), Max: 98.3 (23 Jul 2023 21:38)  HR: 87 (24 Jul 2023 07:40) (87 - 95)  BP: 121/51 (24 Jul 2023 07:40) (103/44 - 126/45)  RR: 20 (24 Jul 2023 12:04) (18 - 20)  SpO2: 94% (24 Jul 2023 12:04) (94% - 100%) on room air    Exam:  GEN: No acute distress  HEENT: NCAT, pupils equal and reactive, EOMI, no oropharyngeal lesions  NECK: Supple, no JVD, no LAD  CVS:  +S1, +S2, regular, tachycardic with rate ~100s  RESP: Normal resp effort at rest, coarse breath sounds B/L  GI: +BS, abdomen soft, non-tender, non-distended  EXT: No edema or tenderness, normal cap refill  SKIN: Warm, dry  NEURO: AOX3, no gross deficits  MOOD: Calm, pleasant    Labs:                                      11.5   5.87  )---------( 287              34.2       141  |  112  |  17  ----------------------<  115  4.2   |  23  |  0.73    Ca 8.0    Phos  2.5    Mg  2.1        TPro  6.6  /  Alb  2.6  /  TBili  0.7  /  DBili  x   /  AST  97 /  ALT  151  /  AlkPhos  88      Troponin 14,957 --> 8752   proBNP 5290    Micro:  Blood cultures x 2 7/16: Negative  Urine culture 7/16: Negative    Imaging:  CTA chest W/ 7/22: Bilateral pulmonary emboli. The emboli involve the right main, upper lobar, interlobar, middle lobar and lower lobar arteries along with the segmental arteries of the right upper, middle, and lower lobes. In addition, there are emboli within the left upper and lower lobar arteries and the segmental arteries of the left upper and lower lobe. The right atrium and right ventricle are enlarged relative to the left ventricle. Unchanged lung parenchymal appearance described in study from 7/21.     CT chest WO 7/21: Since 7/16/2023, the left upper lobe ground glass opacities and consolidation have decreased. A follow-up is recommended in 8 weeks   evaluate for resolution/change. Small left pleural effusion.    CT R knee non con 7/17/23:  No acute fracture or dislocation. Osteoarthritis.    XR B/L knees 7/16/23: Corticated bone fragment adjacent to the upper R femoral lateral condyle concerning for avulsed fracture versus exostosis.    CT A/P WO 7/16/23: There is a 5 mm ill-defined sclerotic focus in the right iliac bone. Although this finding is probably benign, given history of breast cancer, metastatic disease is not reliably excluded. Consider bone scan for further evaluation. Hepatomegaly. Diffuse hepatic steatosis. 2.2 cm probable right ovarian cyst, amenable to sonographic correlation. Spiculated appearing scarring in the right breast may correlate with patient's known history of right breast malignancy. Small hiatal hernia.    CT chest WO 7/16: Large airspace consolidation with surrounding ground glass in the apicoposterior segment of the L upper lobe. Trace L pleural effusion. This finding could represent pneumonia in the appropriate clinical setting. Additional differential includes pulmonary infarction (presence or absence of pulmonary embolism is not assessed on the current noncontrast exam. No acute osseous fracture.     CT C spine 7/16: No acute fracture. No AP plane subluxation. Reversal of normally visualized cervical lordosis. Multilevel degenerative changes    CT head WO 7/16: No CT evidence of acute intracranial hemorrhage. Atherosclerotic changes.     Cardiac Testing:  Tele 7/22-7/23: Episodes of SVT    Follow-up echo requested 7/22 to assess for RV strain    Tele 7/22: Sinus tachycardia, rate 90s-110s    L heart cath 7/20: Non occlusive CAD    Tele 7/19: SR, rate 60s-80s    Tele 7/18: Episode of NSVT     Echo 7/17/23: Mitral Valve There is calcification of both mitral valve leaflets. The leaflet opening is normal. Mild (1+) mitral regurgitation is present. EA reversal of the mitral inflow consistent with reduced compliance of the left ventricle. Aortic Valve The aortic valve is well visualized, appears mildly calcified. Valve opening seems to be normal. Tricuspid Valve Normal appearing tricuspid valve structure and function. Trace tricuspid valve regurgitation is present. Pulmonic Valve Normal appearing pulmonic valve structure. Trace to Mild pulmonic valvular regurgitation is present. Left Atrium The left atrium is mildly dilated. Left Ventricle Estimated left ventricular ejection fraction is 45 %. The left ventricle is normal in size and wall thickness. Mild, diffuse hypokinesis of the left ventricle is present. Right Atrium Normal appearing right atrium. Right Ventricle Normal appearing right ventricle structure and function. Pericardial Effusion No evidence of pericardial effusion. Pleural Effusion No evidence of pleural effusion. Miscellaneous The IVC was not visualized.    Meds:  MEDICATIONS  (STANDING):  aMIOdarone    Tablet 400 milliGRAM(s) Oral every 8 hours  aspirin  chewable 81 milliGRAM(s) Oral daily  atorvastatin 40 milliGRAM(s) Oral at bedtime  enoxaparin Injectable 100 milliGRAM(s) SubCutaneous every 12 hours  lactobacillus acidophilus 1 Tablet(s) Oral daily  letrozole 2.5 milliGRAM(s) Oral daily  sacubitril 24 mG/valsartan 26 mG 1 Tablet(s) Oral two times a day  spironolactone 25 milliGRAM(s) Oral daily  venlafaxine XR. 150 milliGRAM(s) Oral daily    MEDICATIONS  (PRN):  acetaminophen     Tablet .. 650 milliGRAM(s) Oral every 6 hours PRN Mild Pain (1 - 3)  aluminum hydroxide/magnesium hydroxide/simethicone Suspension 30 milliLiter(s) Oral every 4 hours PRN Dyspepsia  melatonin 3 milliGRAM(s) Oral at bedtime PRN Insomnia  ondansetron Injectable 4 milliGRAM(s) IV Push every 6 hours PRN Nausea and/or Vomiting

## 2023-07-24 NOTE — CONSULT NOTE ADULT - NS ATTEND AMEND GEN_ALL_CORE FT
Agree with above. Ambulation test today on anticoagulation to see if clinical improvement. Findings relayed to patient and primary team .    Thank you for allowing me to participate in the care of your patient. Feel free to contact me if any clinical issues arise via contact information below or MS Teams.    Pablo Dudley MD, FACC, [facility]   Director, Interventional Cardiology, [address], [address], [address] Office: [phone]  [place] Office: [phone]  Email: [email or web address]

## 2023-07-25 PROCEDURE — 99233 SBSQ HOSP IP/OBS HIGH 50: CPT

## 2023-07-25 PROCEDURE — 93010 ELECTROCARDIOGRAM REPORT: CPT

## 2023-07-25 RX ADMIN — ENOXAPARIN SODIUM 100 MILLIGRAM(S): 100 INJECTION SUBCUTANEOUS at 09:53

## 2023-07-25 RX ADMIN — Medication 81 MILLIGRAM(S): at 09:52

## 2023-07-25 RX ADMIN — SACUBITRIL AND VALSARTAN 1 TABLET(S): 24; 26 TABLET, FILM COATED ORAL at 21:17

## 2023-07-25 RX ADMIN — ATORVASTATIN CALCIUM 40 MILLIGRAM(S): 80 TABLET, FILM COATED ORAL at 21:17

## 2023-07-25 RX ADMIN — Medication 150 MILLIGRAM(S): at 09:51

## 2023-07-25 RX ADMIN — SACUBITRIL AND VALSARTAN 1 TABLET(S): 24; 26 TABLET, FILM COATED ORAL at 09:48

## 2023-07-25 RX ADMIN — AMIODARONE HYDROCHLORIDE 400 MILLIGRAM(S): 400 TABLET ORAL at 21:17

## 2023-07-25 RX ADMIN — Medication 1 TABLET(S): at 09:52

## 2023-07-25 RX ADMIN — AMIODARONE HYDROCHLORIDE 400 MILLIGRAM(S): 400 TABLET ORAL at 15:05

## 2023-07-25 RX ADMIN — ENOXAPARIN SODIUM 100 MILLIGRAM(S): 100 INJECTION SUBCUTANEOUS at 21:17

## 2023-07-25 RX ADMIN — SPIRONOLACTONE 25 MILLIGRAM(S): 25 TABLET, FILM COATED ORAL at 09:52

## 2023-07-25 RX ADMIN — AMIODARONE HYDROCHLORIDE 400 MILLIGRAM(S): 400 TABLET ORAL at 06:15

## 2023-07-25 RX ADMIN — LETROZOLE 2.5 MILLIGRAM(S): 2.5 TABLET, FILM COATED ORAL at 09:52

## 2023-07-25 NOTE — PROGRESS NOTE ADULT - SUBJECTIVE AND OBJECTIVE BOX
[facility] Vascular Cardiology Team Note                                                              [facility] /[facility] Faculty Practice                                                                      Office:  [address] [address]                                                                                  [facility] [address]                                                                     Telephone: [phone]. Fax:[phone]                                                                          VASCULAR CARDIOLOGY  NOTE     HISTORY OF PRESENT ILLNESS:  HPI:HPI:  68F w AF s/p ablation, R breast CA 2017 on letrozole (Oncologist stopped Xarelto 1 year ago), depression, HTN, neuropathy R foot, and OA, BIBEMS s/p fall on 7/13 outside in her yard without headstrike or LOC, she was on the ground for 2+ hrs before being able to get up. Patient originally admitted to medicine for further management of rhabdomyolysis, NSTEMI ([facility] with only non-obstructive CAD on 7/20), and progressively worsening hypoxia thought to be iso KARELY PNA and acute on chronic CHF however despite diuresis and abx patient's hypoxia continued to progress to 6L NC requirement. Sent for CTA Chest today, found to have bilateral PEs with possible RHS. Started on full dose Lovenox. MICU consulted for PE with possible RHS.   Vascular cardiology consulted for PE/ DVT ? RV strain     7/24/23 Patient seen this AM; hospital day 8. PE diagnosed 7/22 after episode of hypoxia. s/p 3 day of abx for PNA,. [facility] non obs CAD  Follow up ECHO pending post PE dx. O2 on 4L this AM, room air o2 sat while speaking 89%. Amiodarone started for SVT/NSVT per cardiology . Patient reporting mild SOB when exertion, said her SOB started after using the incentive spirometer. reports she does get OOB with assistance, feels deconditioned with ambulating to the bathroom, can't say she feels symptomatic while ambulating given feeling more deconditioned. Trop and BNP continuing to downtrend.   **addendum patient ambulated PT off O2, maintaining SPO2 > 90% on room air; now sitting in chair 94% room air****    7/25/23: No dyspnea on RA. O2 sat 100%. Will ambulate today with PT  	    MEDICATIONS:  MEDICATIONS  (STANDING):  aMIOdarone    Tablet   Oral   aMIOdarone    Tablet 400 milliGRAM(s) Oral every 8 hours  aspirin  chewable 81 milliGRAM(s) Oral daily  atorvastatin 40 milliGRAM(s) Oral at bedtime  enoxaparin Injectable 100 milliGRAM(s) SubCutaneous every 12 hours  lactobacillus acidophilus 1 Tablet(s) Oral daily  letrozole 2.5 milliGRAM(s) Oral daily  sacubitril 24 mG/valsartan 26 mG 1 Tablet(s) Oral two times a day  spironolactone 25 milliGRAM(s) Oral daily  venlafaxine XR. 150 milliGRAM(s) Oral daily        PHYSICAL EXAM:  T(C): 36.7 (07-25-23 @ 08:35), Max: 36.9 (07-24-23 @ 20:30)  HR: 82 (07-25-23 @ 08:35) (76 - 92)  BP: 131/45 (07-25-23 @ 08:35) (110/66 - 131/45)  RR: 19 (07-25-23 @ 08:35) (19 - 19)  SpO2: 97% (07-25-23 @ 08:35) (97% - 97%)  Wt(kg): --  I&O's Summary      GENERAL: NAD, AAOx3  CHEST/LUNG: Clear to auscultation bilaterally; No wheeze  HEART: s1 s2 Regular rate and rhythm; No murmurs, rubs, or gallops  ABDOMEN: Soft, Nontender, Nondistended; Bowel sounds present X 4 quadrants   EXTREMITIES:  2+ Peripheral Pulses, No clubbing, cyanosis, or edema  SKIN: No rashes or lesions,  b/l LE not red, cool to touch,  no open skin no drainage  NEURO: nonfocal CN/motor/sensory/reflexes  Psych: normal affect and behavior, calm and cooperative         LABS                        11.5   5.87  )-----------( 287      ( 24 Jul 2023 06:36 )             34.2     07-24    141  |  112<H>  |  17  ----------------------------<  115<H>  4.2   |  23  |  0.73    Ca    8.0<L>      24 Jul 2023 06:36  Phos  2.5     07-24  Mg     2.1     07-24    RADIOLOGY :CTA- chest < from: CT Angio Chest PE Protocol w/ IV Cont (07.22.23 @ 16:57) >  IMPRESSION:    1.  Bilateral pulmonary emboli.  2.  The right atrium right ventricle are enlarged relative to the left   ventricle which could occur in the clinical setting of right heart strain.  3.  The findings were discussed with and read back verification obtained   on 7/22/2023 at 1710 hours from Dr. Vickers.    --- End of Report ---    < end of copied text >  US Duplex  - < from: US Duplex Venous Lower Ext Complete, Bilateral (07.23.23 @ 18:51) >  IMPRESSION:  Acute left lower extremity deep venous thrombosis, above and below the   knee.  No evidence of right lower extremity deep venous cirrhosis.    < end of copied text >ECHO < from: Transthoracic Echocardiogram Follow Up (07.24.23 @ 09:03) >  Limited study to assess right ventricular strain.   Estimated left ventricular ejection fraction is 55 %.   The right ventricle is normal in size.   The right ventricular free wall is hypokinetic with sparing of the apex   and is suggestive of Lewis's sign.   The tricuspid valve leaflets are thin and pliable; valve motion is   normal.   Moderate (2+) tricuspid valve regurgitation is present.   Mild pulmonary hypertension.   No evidence of pericardial effusion.   No evidence of pleural effusion.   The IVC is dilated.  < end of copied text >     EKG 7/23/23 SR PAC rate 88    REITE Bleeding Risk:   [ ] Recent Major Bleeding (2pt)  [ ] Creatinine > 1.2 mg/dL ( 1.5pt)  [x ] Anemia (<13 g/dL M, < 12 g/dL F) (1.5 pt)  [ x] Malignancy History (1pt)  [ x] Clinically - overt PE (1pt)  [ ] Age > 75 (1pt)  Total: 3.5    PESI score: 140 class V  Age >80 [ ], History of Cancer [x ], Hx of chronic cardiopulmonary disease [ ], Heart rate > 110 [ x ], Systolic BP <100 [ ], SpO2 <90% [x ]    Assessment/Plan:  67 y/o F  with hx of HTN, AF s/p ablation, R breast CA 2017 on letrozole, R foot neuropathy, osteoarthritis depression s/p mechanical fall 7/13 admitted for management of fall, elevated Trop, Rhado, PNA found to have PE/DVT on day 6 of hospitalization.     PE/DVT  - intermediate high risk given history of cancer and anemia  -  ECHO revealed no RV strain  -Lovenox BID for anticoagulation  -Transition to oral  AC  -Ambulates and  monitor for close signs of decompensation

## 2023-07-25 NOTE — PROGRESS NOTE ADULT - SUBJECTIVE AND OBJECTIVE BOX
Chief Complaint: Fall    Interval Hx: B/L PE and LLE DVT diagnosed. Patient doing well on full dose AC. Patient seen and examined. Patient reports feeling overall improved. Breathing more comfortably. Weaned to room air. No chest pain. No pleurisy. No cough. No other complaints aside from generalized weakness she attributes to her prolonging hospitalization. She was having diarrhea yesterday, may have stopped no further loose BMs so far today.     ROS: Multi system review is comprehensively negative x 10 systems     Vitals:  T(F): 99.9 (25 Jul 2023 20:35), Max: 99.9 (25 Jul 2023 20:35)  HR: 88 (25 Jul 2023 20:35) (76 - 88)  BP: 137/67 (25 Jul 2023 20:35) (114/53 - 137/67)  RR: 18 (25 Jul 2023 20:35) (18 - 19)  SpO2: 94% (25 Jul 2023 20:35) (94% - 97%) on room air    Exam:  GEN: No acute distress  HEENT: NCAT, pupils equal and reactive, EOMI, no oropharyngeal lesions  NECK: Supple, no JVD  CVS:  +S1, +S2, regular, rate WNL  RESP: Normal resp effort at rest, coarse breath sounds B/L  GI: +BS, abdomen soft, non-tender, non-distended  EXT: Minimal pedal edema, no tenderness, no redness, normal cap refill  SKIN: Warm, dry  NEURO: AOX3, no gross deficits  MOOD: Calm, pleasant    Labs:                                      11.5   5.87  )---------( 287              34.2       141  |  112  |  17  ----------------------<  115  4.2   |  23  |  0.73    Ca 8.0    Phos  2.5    Mg  2.1        TPro  6.6  /  Alb  2.6  /  TBili  0.7  /  DBili  x   /  AST  97 /  ALT  151  /  AlkPhos  88      Troponin 14,957 --> 8752   proBNP 5290    Micro:  Blood cultures x 2 7/16: Negative  Urine culture 7/16: Negative    Imaging:  US venous duplex of LLE 7/23: Acute left lower extremity deep venous thrombosis, above and below the knee. No evidence of right lower extremity deep venous thrombosis.    CTA chest W/ 7/22: Bilateral pulmonary emboli. The emboli involve the right main, upper lobar, interlobar, middle lobar and lower lobar arteries along with the segmental arteries of the right upper, middle, and lower lobes. In addition, there are emboli within the left upper and lower lobar arteries and the segmental arteries of the left upper and lower lobe. The right atrium and right ventricle are enlarged relative to the left ventricle. Unchanged lung parenchymal appearance described in study from 7/21.     CT chest WO 7/21: Since 7/16/2023, the left upper lobe ground glass opacities and consolidation have decreased. A follow-up is recommended in 8 weeks   evaluate for resolution/change. Small left pleural effusion.    CT R knee non con 7/17/23:  No acute fracture or dislocation. Osteoarthritis.    XR B/L knees 7/16/23: Corticated bone fragment adjacent to the upper R femoral lateral condyle concerning for avulsed fracture versus exostosis.    CT A/P WO 7/16/23: There is a 5 mm ill-defined sclerotic focus in the right iliac bone. Although this finding is probably benign, given history of breast cancer, metastatic disease is not reliably excluded. Consider bone scan for further evaluation. Hepatomegaly. Diffuse hepatic steatosis. 2.2 cm probable right ovarian cyst, amenable to sonographic correlation. Spiculated appearing scarring in the right breast may correlate with patient's known history of right breast malignancy. Small hiatal hernia.    CT chest WO 7/16: Large airspace consolidation with surrounding ground glass in the apicoposterior segment of the L upper lobe. Trace L pleural effusion. This finding could represent pneumonia in the appropriate clinical setting. Additional differential includes pulmonary infarction (presence or absence of pulmonary embolism is not assessed on the current noncontrast exam. No acute osseous fracture.     CT C spine 7/16: No acute fracture. No AP plane subluxation. Reversal of normally visualized cervical lordosis. Multilevel degenerative changes    CT head WO 7/16: No CT evidence of acute intracranial hemorrhage. Atherosclerotic changes.     Cardiac Testing:  Tele 7/25: No further episodes of SVT    TTE 7/24:  Limited study to assess right ventricular strain. Estimated left ventricular ejection fraction is 55 %. The right ventricle is normal in size. The right ventricular free wall is hypokinetic with sparing of the apex and is suggestive of Lewis's sign. The tricuspid valve leaflets are thin and pliable; valve motion is normal. Moderate (2+) tricuspid valve regurgitation is present. Mild pulmonary hypertension. No evidence of pericardial effusion. No evidence of pleural effusion. The IVC is dilated.    Tele 7/22-7/23: Episodes of SVT    Follow-up echo requested 7/22 to assess for RV strain    Tele 7/22: Sinus tachycardia, rate 90s-110s    L heart cath 7/20: Non occlusive CAD    Tele 7/19: SR, rate 60s-80s    Tele 7/18: Episode of NSVT     Echo 7/17/23: Mitral Valve There is calcification of both mitral valve leaflets. The leaflet opening is normal. Mild (1+) mitral regurgitation is present. EA reversal of the mitral inflow consistent with reduced compliance of the left ventricle. Aortic Valve The aortic valve is well visualized, appears mildly calcified. Valve opening seems to be normal. Tricuspid Valve Normal appearing tricuspid valve structure and function. Trace tricuspid valve regurgitation is present. Pulmonic Valve Normal appearing pulmonic valve structure. Trace to Mild pulmonic valvular regurgitation is present. Left Atrium The left atrium is mildly dilated. Left Ventricle Estimated left ventricular ejection fraction is 45 %. The left ventricle is normal in size and wall thickness. Mild, diffuse hypokinesis of the left ventricle is present. Right Atrium Normal appearing right atrium. Right Ventricle Normal appearing right ventricle structure and function. Pericardial Effusion No evidence of pericardial effusion. Pleural Effusion No evidence of pleural effusion. Miscellaneous The IVC was not visualized.    Meds:  MEDICATIONS  (STANDING):  aMIOdarone    Tablet 400 milliGRAM(s) Oral every 8 hours  aspirin  chewable 81 milliGRAM(s) Oral daily  atorvastatin 40 milliGRAM(s) Oral at bedtime  enoxaparin Injectable 100 milliGRAM(s) SubCutaneous every 12 hours  lactobacillus acidophilus 1 Tablet(s) Oral daily  letrozole 2.5 milliGRAM(s) Oral daily  sacubitril 24 mG/valsartan 26 mG 1 Tablet(s) Oral two times a day  spironolactone 25 milliGRAM(s) Oral daily  venlafaxine XR. 150 milliGRAM(s) Oral daily    MEDICATIONS  (PRN):  acetaminophen     Tablet .. 650 milliGRAM(s) Oral every 6 hours PRN Mild Pain (1 - 3)  aluminum hydroxide/magnesium hydroxide/simethicone Suspension 30 milliLiter(s) Oral every 4 hours PRN Dyspepsia  melatonin 3 milliGRAM(s) Oral at bedtime PRN Insomnia  ondansetron Injectable 4 milliGRAM(s) IV Push every 6 hours PRN Nausea and/or Vomiting

## 2023-07-25 NOTE — PROGRESS NOTE ADULT - NS ATTEND AMEND GEN_ALL_CORE FT
Agree with above. Clinically improving significantly. Off oxygen. Recommend DOAC with close follow up in vascular office.     Thank you for allowing me to participate in the care of your patient. Feel free to contact me if any clinical issues arise via contact information below or MS Teams.    Pablo Dudley MD, FACC, [facility]   Director, Interventional Cardiology, [address], [facility] Floor, [address] Office: [phone]  [place] Office: [phone]  Email: [email or web address]

## 2023-07-25 NOTE — PROGRESS NOTE ADULT - TIME BILLING
Time spent  coordinating the patient's care. This includes reviewing documentation pertinent to this admission, results and imaging. Further tests, medications, and procedures have been ordered as indicated. Laboratory results and the plan of care were communicated to the patient. Discussed care plan with consultants including cards . Supporting documentation was completed and added to the patient's chart.
Time spent  coordinating the patient's care. This includes reviewing documentation pertinent to this admission, results and imaging. Further tests, medications, and procedures have been ordered as indicated. Laboratory results and the plan of care were communicated to the patient. Discussed care plan with consultants including ortho. Supporting documentation was completed and added to the patient's chart.
sa
Differential diagnosis and plan of care discussed with patient after the evaluation.   Counseling on diet, exercise, and medication compliance was done.  Counseling on smoking and alcohol cessation was offered when appropriate.  Pain assessed and judicious use of narcotics when appropriate was discussed.  Importance of fall prevention discussed.  Advanced care planning was discussed with patient and family.
sa

## 2023-07-25 NOTE — PROGRESS NOTE ADULT - ASSESSMENT
68 year old woman with hx of HTN, AF s/p ablation, R breast CA 2017 on letrozole, R foot neuropathy, osteoarthritis depression, here for further evaluation after a fall, trouble getting up.     Fall, physical deconditioning and debility  Improving. Orthostatics negative. Appears to have been a mechanical fall. Seen by PT, recommended [facility]  - Continue restorative PT sessions  - OOB to chair daily    Rhabdomyolysis  Resolved. CPK downtrended with IVF hydration  - Encourage Po hydration at this point    Type II MI  Resolved. Troponin elevated to ~15,000 now down-trending. TTE done, LVEF 45% w/ diffuse hypokinesis. L heart cath done thereafter. No occlusive CAD. Appreciate input from Cardiology  - Continue medical management w/ aspirin, statin  - No longer on metoprolol as this was held in setting of hypotension and persisting episodes of SVT, see below    HFmrEF  Improved. Chronicity a bit unclear. Could be acute, related to type II MI. Appreciate input from Cardiology. She was on regimen of metoprolol succinate, Entresto and spironolactone, now off the metoprolol due to intermittent hypotension and SVT episodes for which Cardiology switched metoprolol to amiodarone.   - Continue Entresto, spironolactone  - Is and Os, daily wt  - F/u with Cardiology    Severe sepsis w lactic acidosis due to community acquired pneumonia, unable to rule out Gram-negative organism, present upon admission  Resolved. CT chest 7/21 demonstrated left upper lobe ground glass opacities, septal thickening and patchy consolidation. Had elevated lactate as well. S/p azithromycin x 3d. S/p ceftriaxone x 5d. Blood cultures negative. Now on room air  - Continue to monitor    Episodes of SVT  Resolved. Developed on 7/23. Possible due to new diagnosis of B/L PE that was ultimately made 7/24. See below. Continued to have SVT episodes despite metoprolol. Now on amiodarone in place of metoprolol as per Cardiology. Also, patient diagnosed with B/L PE for which she was started on full dose AC. No further SVT  - Continue amiodarone  - Management of PE, see below    Acute respiratory failure with hypoxia  Resolved. While she was thought to have pneumonia upon presentation and treated as such, completing a 3-day course of azithromycin and 5-day course of ceftriaxone, she did not have hypoxia in the early stages of her admission. Hypoxia developed after she continued to receive IV fluid hydration for her other presenting issue, that is, rhabdomyolysis in setting of her fall, immobility on floor for few hrs. She received IV Lasix without improvement. CT was performed and showed decreased L lung opacities, small L pleural effusion. on 7/22, she was still hypoxic, requiring up to 6L/min O2, also newly tachycardic to 100s-110s so she was started on full dose Lovenox empirically for possible PE, underwent CTA that confirmed B/L PE, see below. Now markedly improved, weaned to room air.   - Monitor need for O2, currently stable on room air  - Continue management of underlying issues    Bilateral pulmonary emboli  Improving. As noted on CTA chest 7/24. Possible R heart strain as well. Troponin and BNP elevated but downtrending when compared to earlier this admission. Requested ICU evaluation and PE Team evaluation. Echo reviewed by PE Team. RV strain noted but hemodynamics now WNL, oxygenation much improved and patient feeling overall improved. Deferring intervention at this time.   - Continue full dose AC with Lovenox 100mg subcut q12 (1mg/kr subcut q12), can likely switch to DOAC at this point  - Close monitoring, PE Team following    Loose stools  Improving. No leukocytosis. She was on antibiotics recently including ceftriaxone.   - Continue to monitor, if loose stools persist, place on contact isolation and order Cdiff PCR to r/o Cdiff infection    Hypokalemia  In setting of loose stools. Improved with potassium supplementation. Mg normal.  - Continue to monitor    Hepatic steatosis with mild transaminitis  Improved.   - Continue to monitor as outpatient    Hx breast CA  Onc consult appreciated. Sclerotic focus on R iliac, likely benign but given breast cancer hx, would pursue NM bone scan  - NM bone scan, can be pursued as outpatient    R ovarian cyst  As noted on imaging  - Outpatient follow up        DVT px: Now on full dose AC for B/L PEs  Code status: Full  Dispo: Anticipate DC to [facility] once clinically improved and inpatient workup complete, ~24 hrs, will need transition to DOAC and will need to assess for improvement in her diarrhea

## 2023-07-26 ENCOUNTER — TRANSCRIPTION ENCOUNTER (OUTPATIENT)
Age: 68
End: 2023-07-26

## 2023-07-26 VITALS
RESPIRATION RATE: 18 BRPM | DIASTOLIC BLOOD PRESSURE: 51 MMHG | WEIGHT: 225.31 LBS | TEMPERATURE: 99 F | SYSTOLIC BLOOD PRESSURE: 141 MMHG | HEART RATE: 84 BPM | OXYGEN SATURATION: 98 %

## 2023-07-26 PROCEDURE — 99239 HOSP IP/OBS DSCHRG MGMT >30: CPT

## 2023-07-26 RX ORDER — APIXABAN 2.5 MG/1
10 TABLET, FILM COATED ORAL
Refills: 0 | Status: DISCONTINUED | OUTPATIENT
Start: 2023-07-26 | End: 2023-07-26

## 2023-07-26 RX ORDER — SACUBITRIL AND VALSARTAN 24; 26 MG/1; MG/1
1 TABLET, FILM COATED ORAL
Qty: 0 | Refills: 0 | DISCHARGE
Start: 2023-07-26

## 2023-07-26 RX ORDER — SPIRONOLACTONE 25 MG/1
1 TABLET, FILM COATED ORAL
Qty: 0 | Refills: 0 | DISCHARGE
Start: 2023-07-26

## 2023-07-26 RX ORDER — LACTOBACILLUS ACIDOPHILUS 100MM CELL
1 CAPSULE ORAL
Qty: 0 | Refills: 0 | DISCHARGE
Start: 2023-07-26

## 2023-07-26 RX ORDER — ATORVASTATIN CALCIUM 80 MG/1
1 TABLET, FILM COATED ORAL
Qty: 0 | Refills: 0 | DISCHARGE
Start: 2023-07-26

## 2023-07-26 RX ORDER — AMIODARONE HYDROCHLORIDE 400 MG/1
1 TABLET ORAL
Qty: 0 | Refills: 0 | DISCHARGE

## 2023-07-26 RX ORDER — APIXABAN 2.5 MG/1
5 TABLET, FILM COATED ORAL
Refills: 0 | Status: CANCELLED | OUTPATIENT
Start: 2023-08-02 | End: 2023-07-26

## 2023-07-26 RX ORDER — APIXABAN 2.5 MG/1
1 TABLET, FILM COATED ORAL
Qty: 0 | Refills: 0 | DISCHARGE
Start: 2023-07-26

## 2023-07-26 RX ORDER — METOPROLOL TARTRATE 50 MG
1 TABLET ORAL
Refills: 0 | DISCHARGE

## 2023-07-26 RX ORDER — APIXABAN 2.5 MG/1
2 TABLET, FILM COATED ORAL
Qty: 0 | Refills: 0 | DISCHARGE
Start: 2023-07-26

## 2023-07-26 RX ORDER — ASPIRIN/CALCIUM CARB/MAGNESIUM 324 MG
1 TABLET ORAL
Qty: 0 | Refills: 0 | DISCHARGE
Start: 2023-07-26

## 2023-07-26 RX ADMIN — APIXABAN 10 MILLIGRAM(S): 2.5 TABLET, FILM COATED ORAL at 12:14

## 2023-07-26 RX ADMIN — SPIRONOLACTONE 25 MILLIGRAM(S): 25 TABLET, FILM COATED ORAL at 08:48

## 2023-07-26 RX ADMIN — AMIODARONE HYDROCHLORIDE 400 MILLIGRAM(S): 400 TABLET ORAL at 05:29

## 2023-07-26 RX ADMIN — Medication 1 TABLET(S): at 08:48

## 2023-07-26 RX ADMIN — ENOXAPARIN SODIUM 100 MILLIGRAM(S): 100 INJECTION SUBCUTANEOUS at 08:49

## 2023-07-26 RX ADMIN — AMIODARONE HYDROCHLORIDE 400 MILLIGRAM(S): 400 TABLET ORAL at 14:26

## 2023-07-26 RX ADMIN — SACUBITRIL AND VALSARTAN 1 TABLET(S): 24; 26 TABLET, FILM COATED ORAL at 08:48

## 2023-07-26 RX ADMIN — Medication 81 MILLIGRAM(S): at 08:48

## 2023-07-26 RX ADMIN — Medication 150 MILLIGRAM(S): at 08:49

## 2023-07-26 RX ADMIN — LETROZOLE 2.5 MILLIGRAM(S): 2.5 TABLET, FILM COATED ORAL at 08:49

## 2023-07-26 NOTE — DISCHARGE NOTE PROVIDER - NSDCFUSCHEDAPPT_GEN_ALL_CORE_FT
Jovana Diana  [facility]  BREAST [address]  Scheduled Appointment: 08/30/2023

## 2023-07-26 NOTE — DISCHARGE NOTE PROVIDER - NSDCCPCAREPLAN_GEN_ALL_CORE_FT
PRINCIPAL DISCHARGE DIAGNOSIS  Diagnosis: Rhabdomyolysis  Assessment and Plan of Treatment: resolved      SECONDARY DISCHARGE DIAGNOSES  Diagnosis: Pneumonia  Assessment and Plan of Treatment: completed antibiotic course    Diagnosis: Pulmonary embolism  Assessment and Plan of Treatment: Take eliquis 10mg twice a day thru 8/1, then take 5mg twice a day starting 8/2 ongoing. Follow up with Dr. Dudley    Diagnosis: Ovarian cyst  Assessment and Plan of Treatment: Get a nonemergent pelvic ultrasound to further evaluate.    Diagnosis: Breast cancer  Assessment and Plan of Treatment: Follow up with your oncologist and get an outpatient bone scan to evaluate lesion in R iliac bone.

## 2023-07-26 NOTE — DISCHARGE NOTE PROVIDER - CARE PROVIDER_API CALL
Bertram Silvestre  [address]  [place] [address]  Phone: [phone]  Fax: [phone]  Follow Up Time: 1 week    Pablo Dudley  Interventional Cardiology  [address], [address]  [place] [address]  Phone: [phone]  Fax: [phone]  Follow Up Time: 2 weeks    Juan Pablo Patel  Cardiovascular Disease  [address], [address]  [place] [address]  Phone: [phone]  Fax: [phone]  Follow Up Time: 2 weeks

## 2023-07-26 NOTE — DISCHARGE NOTE NURSING/CASE MANAGEMENT/SOCIAL WORK - PATIENT PORTAL LINK FT
You can access the FollowMyHealth Patient Portal offered by [facility] by registering at the following website: [email or web address]. By joining [employer]’s FollowMyHealth portal, you will also be able to view your health information using other applications (apps) compatible with our system.

## 2023-07-26 NOTE — DISCHARGE NOTE PROVIDER - PROVIDER TOKENS
PROVIDER:[TOKEN:[[id number]],FOLLOWUP:[1 week]],PROVIDER:[TOKEN:[[id number]],FOLLOWUP:[2 weeks]],PROVIDER:[TOKEN:[[id number]],FOLLOWUP:[2 weeks]]

## 2023-07-26 NOTE — DISCHARGE NOTE NURSING/CASE MANAGEMENT/SOCIAL WORK - NSDCPEFALRISK_GEN_ALL_CORE
For information on Fall & Injury Prevention, visit: [email or web address] OR  [email or web address] OR  https://www.cdc.gov/steadi/patient.html

## 2023-07-26 NOTE — DISCHARGE NOTE PROVIDER - NSDCCAREPROVSEEN_GEN_ALL_CORE_FT
Ai Canales (hospital medicine)  Amber Pressley (nephrology)  Khoa Hdz (cardiology)  Pablo Dudley (interventional cardiology)  Yun, Suk-Hyeon (nephrology)  Elias Zheng (heme onc)

## 2023-07-26 NOTE — DISCHARGE NOTE PROVIDER - NSDCPNSUBOBJ_GEN_ALL_CORE
VITALS:  T(F): 98.9 (07-26-23 @ 07:38), Max: 99.9 (07-25-23 @ 20:35)  HR: 84 (07-26-23 @ 07:38) (84 - 88)  BP: 141/51 (07-26-23 @ 07:38) (137/67 - 141/51)  RR: 18 (07-26-23 @ 07:38) (18 - 18)  SpO2: 98% (07-26-23 @ 07:38) (94% - 98%)    PHYSICAL EXAM:  Gen: NAD  HEENT:  pupils equal and reactive, EOMI, no oropharyngeal lesions, erythema, exudates, oral thrush  NECK:   supple, no carotid bruits, no palpable lymph nodes, no thyromegaly  CV:  +S1, +S2, regular, no murmurs or rubs  RESP:   lungs clear to auscultation bilaterally, no wheezing, rales, rhonchi, good air entry bilaterally  BREAST:  not examined  GI:  abdomen soft, non-tender, non-distended, normal BS, no bruits, no abdominal masses, no palpable masses  RECTAL:  not examined  :  not examined  MSK:   normal muscle tone, no atrophy, no rigidity, no contractions  EXT:  no clubbing, no cyanosis, no edema, no calf pain, swelling or erythema  VASCULAR:  pulses equal and symmetric in the upper and lower extremities  NEURO:  AAOX3, no focal neurological deficits, follows all commands, able to move extremities spontaneously  SKIN:  no ulcers, lesions or rashes    Micro:  Blood cultures x 2 7/16: Negative  Urine culture 7/16: Negative    Imaging:  US venous duplex of [facility] 7/23: Acute left lower extremity deep venous thrombosis, above and below the knee. No evidence of right lower extremity deep venous thrombosis.    CTA chest W/ 7/22: Bilateral pulmonary emboli. The emboli involve the right main, upper lobar, interlobar, middle lobar and lower lobar arteries along with the segmental arteries of the right upper, middle, and lower lobes. In addition, there are emboli within the left upper and lower lobar arteries and the segmental arteries of the left upper and lower lobe. The right atrium and right ventricle are enlarged relative to the left ventricle. Unchanged lung parenchymal appearance described in study from 7/21.     CT chest WO 7/21: Since 7/16/2023, the left upper lobe ground glass opacities and consolidation have decreased. A follow-up is recommended in 8 weeks   evaluate for resolution/change. Small left pleural effusion.    CT R knee non con 7/17/23:  No acute fracture or dislocation. Osteoarthritis.    XR B/L knees 7/16/23: Corticated bone fragment adjacent to the upper R femoral lateral condyle concerning for avulsed fracture versus exostosis.    CT A/P WO 7/16/23: There is a 5 mm ill-defined sclerotic focus in the right iliac bone. Although this finding is probably benign, given history of breast cancer, metastatic disease is not reliably excluded. Consider bone scan for further evaluation. Hepatomegaly. Diffuse hepatic steatosis. 2.2 cm probable right ovarian cyst, amenable to sonographic correlation. Spiculated appearing scarring in the right breast may correlate with patient's known history of right breast malignancy. Small hiatal hernia.    CT chest WO 7/16: Large airspace consolidation with surrounding ground glass in the apicoposterior segment of the L upper lobe. Trace L pleural effusion. This finding could represent pneumonia in the appropriate clinical setting. Additional differential includes pulmonary infarction (presence or absence of pulmonary embolism is not assessed on the current noncontrast exam. No acute osseous fracture.     CT C spine 7/16: No acute fracture. No AP plane subluxation. Reversal of normally visualized cervical lordosis. Multilevel degenerative changes    CT head WO 7/16: No CT evidence of acute intracranial hemorrhage. Atherosclerotic changes.     Cardiac Testing:  Tele 7/25: No further episodes of SVT    TTE 7/24:  Limited study to assess right ventricular strain. Estimated left ventricular ejection fraction is 55 %. The right ventricle is normal in size. The right ventricular free wall is hypokinetic with sparing of the apex and is suggestive of Lewis's sign. The tricuspid valve leaflets are thin and pliable; valve motion is normal. Moderate (2+) tricuspid valve regurgitation is present. Mild pulmonary hypertension. No evidence of pericardial effusion. No evidence of pleural effusion. The IVC is dilated.    Tele 7/22-7/23: Episodes of SVT    Follow-up echo requested 7/22 to assess for RV strain    Tele 7/22: Sinus tachycardia, rate 90s-110s    L heart cath 7/20: Non occlusive CAD    Tele 7/19: SR, rate 60s-80s    Tele 7/18: Episode of NSVT     Echo 7/17/23: Mitral Valve There is calcification of both mitral valve leaflets. The leaflet opening is normal. Mild (1+) mitral regurgitation is present. EA reversal of the mitral inflow consistent with reduced compliance of the left ventricle. Aortic Valve The aortic valve is well visualized, appears mildly calcified. Valve opening seems to be normal. Tricuspid Valve Normal appearing tricuspid valve structure and function. Trace tricuspid valve regurgitation is present. Pulmonic Valve Normal appearing pulmonic valve structure. Trace to Mild pulmonic valvular regurgitation is present. Left Atrium The left atrium is mildly dilated. Left Ventricle Estimated left ventricular ejection fraction is 45 %. The left ventricle is normal in size and wall thickness. Mild, diffuse hypokinesis of the left ventricle is present. Right Atrium Normal appearing right atrium. Right Ventricle Normal appearing right ventricle structure and function. Pericardial Effusion No evidence of pericardial effusion. Pleural Effusion No evidence of pleural effusion. Miscellaneous The IVC was not visualized.

## 2023-07-26 NOTE — PHARMACOTHERAPY INTERVENTION NOTE - COMMENTS
transitioned from lovenox to eliquis for PE
Medication history complete, reviewed medications with patient and confirmed with doctor first med hx.

## 2023-07-26 NOTE — DISCHARGE NOTE PROVIDER - NSDCMRMEDTOKEN_GEN_ALL_CORE_FT
amiodarone 200 mg oral tablet: 1 orally once a day  apixaban 5 mg oral tablet: 2 tab(s) orally 2 times a day thru 8/1/23  apixaban 5 mg oral tablet: 1 tab(s) orally 2 times a day starting 8/2/23  aspirin 81 mg oral tablet, chewable: 1 tab(s) orally once a day  atorvastatin 40 mg oral tablet: 1 tab(s) orally once a day (at bedtime)  lactobacillus acidophilus oral capsule: 1 cap(s) orally once a day  letrozole 2.5 mg oral tablet: 1 tab(s) orally once a day  ondansetron 8 mg oral tablet: 1 tab(s) orally once a day as needed for  sacubitril-valsartan 24 mg-26 mg oral tablet: 1 tab(s) orally 2 times a day  spironolactone 25 mg oral tablet: 1 tab(s) orally once a day  venlafaxine 150 mg oral capsule, extended release: 1 cap(s) orally once a day

## 2023-07-26 NOTE — DISCHARGE NOTE PROVIDER - DID THE PATIENT PRESENT WITH OR WAS TREATED FOR MALNUTRITION DURING THIS ADMISSION
Nutrition Problem #1: Increased nutrient needs  Intervention: Food and/or Nutrient Delivery: Continue Current Diet, Start Oral Nutrition Supplement  Nutritional Goals: PO intakes are greater than 50% at meals No

## 2023-07-26 NOTE — DISCHARGE NOTE PROVIDER - HOSPITAL COURSE
CC: elevated trop  HPI and Hospital Course: 68 year old woman with hx of HTN, AF s/p ablation, R breast CA 2017 on letrozole, R foot neuropathy, osteoarthritis depression, here for further evaluation after a fall, trouble getting up.     Fall, physical deconditioning and debility  Improving. Orthostatics negative. Appears to have been a mechanical fall. Seen by PT, recommended BLAISE  - Continue restorative PT sessions  - OOB to chair daily    Rhabdomyolysis  Resolved. CPK downtrended with IVF hydration  - Encourage Po hydration at this point    Type II MI  Resolved. Troponin elevated to ~15,000 now down-trending. TTE done, LVEF 45% w/ diffuse hypokinesis. L heart cath done thereafter. No occlusive CAD. Appreciate input from Cardiology  - Continue medical management w/ aspirin, statin  - No longer on metoprolol as this was held in setting of hypotension and persisting episodes of SVT, see below    HFmrEF  Improved. Chronicity a bit unclear. Could be acute, related to type II MI. Appreciate input from Cardiology. She was on regimen of metoprolol succinate, Entresto and spironolactone, now off the metoprolol due to intermittent hypotension and SVT episodes for which Cardiology switched metoprolol to amiodarone.   - Continue Entresto, spironolactone  - Is and Os, daily wt  - F/u with Cardiology    Severe sepsis w lactic acidosis due to community acquired pneumonia, unable to rule out Gram-negative organism, present upon admission  Resolved. CT chest 7/21 demonstrated left upper lobe ground glass opacities, septal thickening and patchy consolidation. Had elevated lactate as well. S/p azithromycin x 3d. S/p ceftriaxone x 5d. Blood cultures negative. Now on room air  - Continue to monitor    Episodes of SVT  Resolved. Developed on 7/23. Possible due to new diagnosis of B/L PE that was ultimately made 7/24. See below. Continued to have SVT episodes despite metoprolol. Now on amiodarone in place of metoprolol as per Cardiology. Also, patient diagnosed with B/L PE for which she was started on full dose AC. No further SVT  - Continue amiodarone, com,pleted loading dose, now 200mg qd  - Management of PE, see below    Acute respiratory failure with hypoxia  Resolved. While she was thought to have pneumonia upon presentation and treated as such, completing a 3-day course of azithromycin and 5-day course of ceftriaxone, she did not have hypoxia in the early stages of her admission. Hypoxia developed after she continued to receive IV fluid hydration for her other presenting issue, that is, rhabdomyolysis in setting of her fall, immobility on floor for few hrs. She received IV Lasix without improvement. CT was performed and showed decreased L lung opacities, small L pleural effusion. on 7/22, she was still hypoxic, requiring up to 6L/min O2, also newly tachycardic to 100s-110s so she was started on full dose Lovenox empirically for possible PE, underwent CTA that confirmed B/L PE, see below. Now markedly improved, weaned to room air.   - Monitor need for O2, currently stable on room air  - Continue management of underlying issues    Bilateral pulmonary emboli  Improving. As noted on CTA chest 7/24. Possible R heart strain as well. Troponin and BNP elevated but downtrending when compared to earlier this admission. Requested ICU evaluation and PE Team evaluation. Echo reviewed by PE Team. RV strain noted but hemodynamics now WNL, oxygenation much improved and patient feeling overall improved. Deferring intervention at this time.   - s/p full a/c w lovenox, transitioned to eliquis today  - Close monitoring, PE Team following, f/u w Dr. Dudley as outpt    Loose stools  Improving. No leukocytosis. She was on antibiotics recently including ceftriaxone.   - Continue to monitor, if loose stools persist, place on contact isolation and order Cdiff PCR to r/o Cdiff infection    Hypokalemia  In setting of loose stools. Improved with potassium supplementation. Mg normal.  - Continue to monitor    Hepatic steatosis with mild transaminitis  Improved.   - Continue to monitor as outpatient    Hx breast CA  Onc consult appreciated. Sclerotic focus on R iliac, likely benign but given breast cancer hx, would pursue NM bone scan  - NM bone scan, can be pursued as outpatient    R ovarian cyst  As noted on imaging  - Outpatient follow up        DVT px: Now on full dose AC for B/L PEs  Code status: Full  Dispo: for BLA[facility]    I have spent 32 min on day of d/c coordinating care.

## 2023-07-27 ENCOUNTER — NON-APPOINTMENT (OUTPATIENT)
Age: 68
End: 2023-07-27

## 2023-07-27 NOTE — CHART NOTE - NSCHARTNOTEFT_GEN_A_CORE
Follow up appointment with Dr. Dudley made on 8/9/23 at 10 am at  Cardiology office.   Left message to pt for appointment.

## 2023-08-01 DIAGNOSIS — R65.20 SEVERE SEPSIS WITHOUT SEPTIC SHOCK: ICD-10-CM

## 2023-08-01 DIAGNOSIS — Z98.890 OTHER SPECIFIED POSTPROCEDURAL STATES: ICD-10-CM

## 2023-08-01 DIAGNOSIS — Z92.21 PERSONAL HISTORY OF ANTINEOPLASTIC CHEMOTHERAPY: ICD-10-CM

## 2023-08-01 DIAGNOSIS — R16.0 HEPATOMEGALY, NOT ELSEWHERE CLASSIFIED: ICD-10-CM

## 2023-08-01 DIAGNOSIS — E87.6 HYPOKALEMIA: ICD-10-CM

## 2023-08-01 DIAGNOSIS — I26.99 OTHER PULMONARY EMBOLISM WITHOUT ACUTE COR PULMONALE: ICD-10-CM

## 2023-08-01 DIAGNOSIS — Z91.041 RADIOGRAPHIC DYE ALLERGY STATUS: ICD-10-CM

## 2023-08-01 DIAGNOSIS — A41.9 SEPSIS, UNSPECIFIED ORGANISM: ICD-10-CM

## 2023-08-01 DIAGNOSIS — Z88.8 ALLERGY STATUS TO OTHER DRUGS, MEDICAMENTS AND BIOLOGICAL SUBSTANCES: ICD-10-CM

## 2023-08-01 DIAGNOSIS — I21.A1 MYOCARDIAL INFARCTION TYPE 2: ICD-10-CM

## 2023-08-01 DIAGNOSIS — R19.7 DIARRHEA, UNSPECIFIED: ICD-10-CM

## 2023-08-01 DIAGNOSIS — E87.1 HYPO-OSMOLALITY AND HYPONATREMIA: ICD-10-CM

## 2023-08-01 DIAGNOSIS — I24.8 OTHER FORMS OF ACUTE ISCHEMIC HEART DISEASE: ICD-10-CM

## 2023-08-01 DIAGNOSIS — M62.82 RHABDOMYOLYSIS: ICD-10-CM

## 2023-08-01 DIAGNOSIS — E86.0 DEHYDRATION: ICD-10-CM

## 2023-08-01 DIAGNOSIS — I47.1 SUPRAVENTRICULAR TACHYCARDIA: ICD-10-CM

## 2023-08-01 DIAGNOSIS — M17.11 UNILATERAL PRIMARY OSTEOARTHRITIS, RIGHT KNEE: ICD-10-CM

## 2023-08-01 DIAGNOSIS — J98.11 ATELECTASIS: ICD-10-CM

## 2023-08-01 DIAGNOSIS — I50.20 UNSPECIFIED SYSTOLIC (CONGESTIVE) HEART FAILURE: ICD-10-CM
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
## 2023-08-01 DIAGNOSIS — E87.20 ACIDOSIS, UNSPECIFIED: ICD-10-CM

## 2023-08-01 DIAGNOSIS — J96.01 ACUTE RESPIRATORY FAILURE WITH HYPOXIA: ICD-10-CM

## 2023-08-01 DIAGNOSIS — Z86.718 PERSONAL HISTORY OF OTHER VENOUS THROMBOSIS AND EMBOLISM: ICD-10-CM

## 2023-08-01 DIAGNOSIS — J18.9 PNEUMONIA, UNSPECIFIED ORGANISM: ICD-10-CM

## 2023-08-01 DIAGNOSIS — Z85.3 PERSONAL HISTORY OF MALIGNANT NEOPLASM OF BREAST: ICD-10-CM

## 2023-08-01 DIAGNOSIS — G51.0 BELL'S PALSY: ICD-10-CM
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
## 2023-08-01 DIAGNOSIS — N17.9 ACUTE KIDNEY FAILURE, UNSPECIFIED: ICD-10-CM

## 2023-08-01 DIAGNOSIS — E88.89 OTHER SPECIFIED METABOLIC DISORDERS: ICD-10-CM

## 2023-08-01 DIAGNOSIS — F32.A DEPRESSION, UNSPECIFIED: ICD-10-CM

## 2023-08-01 DIAGNOSIS — I25.110 ATHEROSCLEROTIC HEART DISEASE OF NATIVE CORONARY ARTERY WITH UNSTABLE ANGINA PECTORIS: ICD-10-CM

## 2023-08-01 DIAGNOSIS — R74.01 ELEVATION OF LEVELS OF LIVER TRANSAMINASE LEVELS: ICD-10-CM

## 2023-08-01 DIAGNOSIS — Z91.018 ALLERGY TO OTHER FOODS: ICD-10-CM

## 2023-08-01 DIAGNOSIS — N83.201 UNSPECIFIED OVARIAN CYST, RIGHT SIDE: ICD-10-CM

## 2023-08-01 DIAGNOSIS — T79.6XXA TRAUMATIC ISCHEMIA OF MUSCLE, INITIAL ENCOUNTER: ICD-10-CM

## 2023-08-01 DIAGNOSIS — G57.91 UNSPECIFIED MONONEUROPATHY OF RIGHT LOWER LIMB: ICD-10-CM

## 2023-08-01 DIAGNOSIS — Z92.3 PERSONAL HISTORY OF IRRADIATION: ICD-10-CM

## 2023-08-09 ENCOUNTER — APPOINTMENT (OUTPATIENT)
Dept: CARDIOLOGY | Facility: CLINIC | Age: 68
End: 2023-08-09

## 2023-08-30 ENCOUNTER — APPOINTMENT (OUTPATIENT)
Dept: BREAST CENTER | Facility: CLINIC | Age: 68
End: 2023-08-30
Payer: MEDICARE

## 2023-08-30 VITALS
SYSTOLIC BLOOD PRESSURE: 127 MMHG | WEIGHT: 208 LBS | DIASTOLIC BLOOD PRESSURE: 71 MMHG | BODY MASS INDEX: 29.12 KG/M2 | HEIGHT: 71 IN | HEART RATE: 86 BPM

## 2023-08-30 DIAGNOSIS — Z86.711 PERSONAL HISTORY OF PULMONARY EMBOLISM: ICD-10-CM

## 2023-08-30 DIAGNOSIS — N64.89 OTHER SPECIFIED DISORDERS OF BREAST: ICD-10-CM

## 2023-08-30 DIAGNOSIS — N83.209 UNSPECIFIED OVARIAN CYST, UNSPECIFIED SIDE: ICD-10-CM

## 2023-08-30 DIAGNOSIS — N61.0 MASTITIS WITHOUT ABSCESS: ICD-10-CM

## 2023-08-30 PROCEDURE — 99213 OFFICE O/P EST LOW 20 MIN: CPT

## 2023-08-30 RX ORDER — SACUBITRIL AND VALSARTAN 24; 26 MG/1; MG/1
24-26 TABLET, FILM COATED ORAL
Refills: 0 | Status: ACTIVE | COMMUNITY

## 2023-08-30 RX ORDER — SPIRONOLACTONE 25 MG/1
25 TABLET ORAL
Refills: 0 | Status: ACTIVE | COMMUNITY

## 2023-08-30 NOTE — PHYSICAL EXAM
[EOMI] : extra ocular movement intact [Sclera nonicteric] : sclera nonicteric [Supple] : supple [No Supraclavicular Adenopathy] : no supraclavicular adenopathy [No Cervical Adenopathy] : no cervical adenopathy [No Thyromegaly] : no thyromegaly [Clear to Auscultation Bilat] : clear to auscultation bilaterally [No dominant masses] : no dominant masses in right breast  [No dominant masses] : no dominant masses left breast [No Nipple Retraction] : no left nipple retraction [No Nipple Discharge] : no left nipple discharge [No Axillary Lymphadenopathy] : no left axillary lymphadenopathy [Soft] : abdomen soft [Not Tender] : non-tender [Examined in the supine and seated position] : examined in the supine and seated position [de-identified] : Mild skin thickening/post radiation change.

## 2023-08-30 NOTE — CONSULT LETTER
[Dear  ___] : Dear  [unfilled], [Consult Letter:] : I had the pleasure of evaluating your patient, [unfilled]. [Sincerely,] : Sincerely, [DroRnny  ___] : Dr. LI [DrRonny ___] : Dr. LI

## 2023-08-30 NOTE — HISTORY OF PRESENT ILLNESS
[FreeTextEntry1] : This is a 68 year old  female had felt a lump in her right breast for about 9 months at the site of a previous surgical biopsy in 2017.  When it was excised, it was found to be cancer.  She had a lumpectomy with sentinel node biopsy by Dr. Fuentes in 5/2017. She had a 3.7 cm invasive cancer, SLNx1 neg, ER+OK-Her 2 3+ tumor.  She then received chemotherapy with Adriamycin at [facility] [place]. She stopped after 3 treatments and transferred her care to Dr. Bingham where she completed additional chemotherapy and 9 months of Herceptin.  She had a DVT during chemotherapy and was on Xarelto. She then went on to radiation with Dr. Luo.    She is on letrozole.  She was here for a routine visit on Feb 10 2021.  She was then seen on Feb 26.  She noticed a purplish rash on her right breast that started the night before.  She didn't recall any trauma, but her cats sometimes jump on her during the night.  She also carried a heavy case of water about 3 days prior.  She hadn't had any fever. Her breast had petechial discolorations with some yellowish color c/w bruising.  There may have been some erythema and she was also started on Keflex. It looked much better by the day after our visit. It fully resolved. She then started having some discomfort in the right breast and again noticed a similar discoloration as before. A biopsy was done on 3/15/2022 showing vascular congestion.  She has no current breast complaints, except for mild intermittent discomfort in the treated breast.  She was admitted to [facility] on July 16 after falling in her yard.  She had pneumonia and pulmonary emboli.  A doppler showed a left lower extremity DVT.  She was hospitalized for 10 days and was in rehab.  She saw Dr. Bingham last week.  She also was found to have an ovarian cyst and has an appointment with Dr. Mehta.

## 2023-08-30 NOTE — DATA REVIEWED
[FreeTextEntry1] : Bilateral mammogram and breast ultrasound ([facility]) 8/23/2023:  No mammographic or sonographic evidence of malignancy. No change.  (Images reviewed and returned to the patient.)

## 2023-10-01 PROBLEM — Z92.3 HISTORY OF RADIATION THERAPY: Status: RESOLVED | Noted: 2020-02-04 | Resolved: 2023-10-01

## 2023-12-08 ENCOUNTER — NON-APPOINTMENT (OUTPATIENT)
Age: 68
End: 2023-12-08

## 2024-01-12 ENCOUNTER — APPOINTMENT (OUTPATIENT)
Dept: RADIATION ONCOLOGY | Facility: CLINIC | Age: 69
End: 2024-01-12
Payer: MEDICARE

## 2024-01-12 VITALS
WEIGHT: 212 LBS | HEIGHT: 71 IN | BODY MASS INDEX: 29.68 KG/M2 | DIASTOLIC BLOOD PRESSURE: 70 MMHG | SYSTOLIC BLOOD PRESSURE: 144 MMHG

## 2024-01-12 DIAGNOSIS — C50.411 MALIGNANT NEOPLASM OF UPPER-OUTER QUADRANT OF RIGHT FEMALE BREAST: ICD-10-CM

## 2024-01-12 DIAGNOSIS — Z17.0 MALIGNANT NEOPLASM OF UPPER-OUTER QUADRANT OF RIGHT FEMALE BREAST: ICD-10-CM

## 2024-01-12 PROCEDURE — 99213 OFFICE O/P EST LOW 20 MIN: CPT

## 2024-01-12 RX ORDER — RIVAROXABAN 2.5 MG/1
TABLET, FILM COATED ORAL
Refills: 0 | Status: ACTIVE | COMMUNITY

## 2024-01-12 RX ORDER — SPIRONOLACTONE 50 MG/1
TABLET ORAL
Refills: 0 | Status: ACTIVE | COMMUNITY

## 2024-01-12 RX ORDER — SACUBITRIL AND VALSARTAN 49; 51 MG/1; MG/1
TABLET, FILM COATED ORAL
Refills: 0 | Status: ACTIVE | COMMUNITY

## 2024-01-12 RX ORDER — AMIODARONE HYDROCHLORIDE 200 MG/1
200 TABLET ORAL
Refills: 0 | Status: DISCONTINUED | COMMUNITY
End: 2024-01-12

## 2024-01-12 RX ORDER — APIXABAN 5 MG/1
5 TABLET, FILM COATED ORAL
Refills: 0 | Status: DISCONTINUED | COMMUNITY
End: 2024-01-12

## 2024-01-12 NOTE — PHYSICAL EXAM
[Breast Palpation Mass] : no palpable masses [Breast Abnormal Lactation (Galactorrhea)] : no nipple discharge [No UE Edema] : there is no upper extremity edema [Cervical Lymph Nodes Enlarged Posterior Bilaterally] : posterior cervical [Cervical Lymph Nodes Enlarged Anterior Bilaterally] : anterior cervical [Supraclavicular Lymph Nodes Enlarged Bilaterally] : supraclavicular [Axillary Lymph Nodes Enlarged Bilaterally] : axillary [Normal] : oriented to person, place and time, the affect was normal, the mood was normal and not anxious [de-identified] : healed R breast scar

## 2024-01-12 NOTE — HISTORY OF PRESENT ILLNESS
[FreeTextEntry1] : The patient is a pleasant 68-year-old female treated for right breast cancer.  She has a history of benign right breast biopsy and noted some thickening and discomfort in the right breast scar from the prior surgery in 2004. Serial mammography was nondiagnostic until April 2017 when mammogram showed a suspicious lesion. A core biopsy was performed confirming malignancy. MRI demonstrated a 3. 2 cm lesion in the right breast with no other suspicious findings.  On 5/4/17 she had a lumpectomy and sentinel lymph node biopsy. Pathology confirmed a 3. 7 cm poorly differentiated invasive ductal carcinoma SBR score 8/9. There was one negative sentinel lymph node and lymphovascular space invasion was identified. ER was positive 70-80% AL 50% [id number] 3+ positive. Margins were less than 1mm for both invasive carcinoma and DCIS. She started chemotherapy with dose dense Adriamycin and Cytoxan x 3 which was very poorly tolerated and discontinued. Following a long break she sought a second opinion and was diagnosed with a right DVT and placed on Xarelto. When she recovered she began Taxol and Herceptin followed by single agent Herceptin. Her last dose of Herceptin was delivered December 2018. Mammogram and sonogram in July 2018 were negative. She started Letrozole but developed severe joint pains and switched  to Exemestane. She was treated with radiotherapy to the right breast. She received a dose of 4240 cGy in 16 fractions followed by a 1000 cGy tumor bed boost completed 4/1/19.  She presents for one year 3 year 7 month followup. She is currently taking Letrozole. She had tried Tamoxifen and Exemestane but they caused joint pains. She continues to report some joint pains, particularly in the knees, although improved. Mammography and breast sonogram were performed in July 2020 and were negative. Saw Dr. Teague in February 2021 for right breast redness. She was placed on antibiotics and redness resolved. Mammo/sono on 7/22/21 were negative for malignancy. Mammo/sono on 8/13/22 was negative. She completed one year of Herceptin. Right breast itching resolved. She is trying to lose weight. She was out of work  for 1 /12 year since [employer] closed her department but now is working at a [employer] as a . She has slight swelling of the right hand. She was referred for PT by Dr. Teague but declined. She declined the COVID vaccine for [employer] reasons.She is very emotional due to multiple stressors in her life (town trying to take away her home due to unpaid taxes and lawsuit with her neighbor).  01/12/24 She presents for 4 years 9 months f/u. She has no current breast complaints, except for mild intermittent twinges in the treated breast. She was admitted to [facility] on July 16 after falling in her yard. She had pneumonia, pulmonary emboli. and rhabdomyeliss. A doppler showed a left lower extremity DVT. She was hospitalized for 10 days and then spent two weeks at rehab. She is taking Xarelto. 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 She saw Dr. Bingham last week. She also was found to have an ovarian cyst and has an appointment with Dr. Mehta. Taking Letrozole with no complaints. Mammogram performed on 8/23/23 was benign.

## 2024-04-01 ENCOUNTER — APPOINTMENT (OUTPATIENT)
Dept: ORTHOPEDIC SURGERY | Facility: CLINIC | Age: 69
End: 2024-04-01
Payer: MEDICARE

## 2024-04-01 VITALS — WEIGHT: 218 LBS | HEIGHT: 71 IN | BODY MASS INDEX: 30.52 KG/M2

## 2024-04-01 PROCEDURE — 99203 OFFICE O/P NEW LOW 30 MIN: CPT | Mod: 25

## 2024-04-01 PROCEDURE — 73564 X-RAY EXAM KNEE 4 OR MORE: CPT | Mod: RT

## 2024-04-01 PROCEDURE — 20610 DRAIN/INJ JOINT/BURSA W/O US: CPT | Mod: RT

## 2024-04-01 NOTE — HISTORY OF PRESENT ILLNESS
[Dull/Aching] : dull/aching [Sharp] : sharp [Intermittent] : intermittent [Rest] : rest [Standing] : standing [Walking/activity] : walking/activity [FreeTextEntry5] : 69 y/o F presents for NP eval of the Rt. knee today. Pt reports of noticing a lump on her Rt. knee a few years ago. Recent dx of OA. No use of NSAIDs. No prior tx. Denies any N/T. Stiffness persists.  [] : no [de-identified] : full leg extension

## 2024-04-01 NOTE — ASSESSMENT
[FreeTextEntry1] : The patient is a 68-year-old female with chief complaint of right knee pain.  She has had discomfort in the right knee for 10 years.  Lately its gotten somewhat worse.  It mostly bothers her with stairs and going from sitting to standing.  She is able to walk short distances without too much trouble.  She has no pain at night.  She has had no treatment with cortisone or hyaluronic acid.  Of note is that she had similar issues with her left knee with severe arthritis and did well with cortisone shot followed by a series of gel injections.  The left knee is currently asymptomatic.  Examination of the right lower extremity reveals normal neurovascular exam.  Examination of right knee reveals limited range of motion from 5 to 115 degrees with varus deformity.  There is mild medial joint line pain with no Toni's sign or instability.  There is no redness, rashes or visible scars.  Her right hip exam is normal.  X-rays done in the office today of the right knee 4 views weightbearing show severe bone-on-bone arthritis of the medial compartment the right knee with subluxation and severe varus deformity.  The left knee on the AP view is similarly affected.  There are no obvious tumors, masses or calcifications seen.  She has tricompartmental arthritis.  Under sterile conditions the right knee was injected with 5 cc of quarter percent plain Marcaine; 5 cc of 2% plain lidocaine and 80 mg of Depo-Medrol.  Patient tolerated the procedure well.  Plan at this point is ice and activity modification.  Will consider a series of gel injections if her symptoms recur.  If she gets pain that warrants a knee replacement then she will need the attune primary revision device.  I will see her back in the office as needed.

## 2024-05-16 RX ORDER — HYALURONATE SODIUM 20 MG/2 ML
20 SYRINGE (ML) INTRAARTICULAR
Qty: 3 | Refills: 0 | Status: ACTIVE | COMMUNITY
Start: 2024-05-16 | End: 1900-01-01

## 2024-06-13 ENCOUNTER — APPOINTMENT (OUTPATIENT)
Dept: ORTHOPEDIC SURGERY | Facility: CLINIC | Age: 69
End: 2024-06-13
Payer: MEDICARE

## 2024-06-13 PROCEDURE — 99213 OFFICE O/P EST LOW 20 MIN: CPT | Mod: 25

## 2024-06-13 PROCEDURE — 20610 DRAIN/INJ JOINT/BURSA W/O US: CPT | Mod: RT

## 2024-06-13 NOTE — ASSESSMENT
[FreeTextEntry1] : The patient is here for right knee pain. She had a previous right knee CSI with 7 weeks of relief. She wishes to consider gel injections. It mostly bothers her with stairs and going from sitting to standing.  She is able to walk short distances without too much trouble.  She has no pain at night.  She has had no treatment with cortisone or hyaluronic acid. The left knee is currently asymptomatic.  Right knee exam: Neurovascularly intact. Sensation intact. 2+ pulses to dorsalis pedis. ROM 0-120 with varus deformity. There is mild medial joint line pain with no Toni's sign or instability.  There is no redness, rashes or visible scars.  Her right hip exam is normal.  The patient received a right knee euflexxa injection. She tolerated it well. She will return in one week.

## 2024-06-13 NOTE — PROCEDURE
[Large Joint Injection] : Large joint injection [Right] : of the right [Pain] : pain [Inflammation] : inflammation [X-ray evidence of Osteoarthritis on this or prior visit] : x-ray evidence of Osteoarthritis on this or prior visit [Alcohol] : alcohol [Betadine] : betadine [Ethyl Chloride sprayed topically] : ethyl chloride sprayed topically [Sterile technique used] : sterile technique used [Euflexxa(20mg)] : 20mg of Euflexxa [#1] : series #1 [] : Patient tolerated procedure well [Call if redness, pain or fever occur] : call if redness, pain or fever occur [Previous OTC use and PT nontherapeutic] : patient has tried OTC's including aspirin, Ibuprofen, Aleve, etc or prescription NSAIDS, and/or exercises at home and/or physical therapy without satisfactory response [Patient had decreased mobility in the joint] : patient had decreased mobility in the joint [Risks, benefits, alternatives discussed / Verbal consent obtained] : the risks benefits, and alternatives have been discussed, and verbal consent was obtained

## 2024-06-13 NOTE — HISTORY OF PRESENT ILLNESS
[Dull/Aching] : dull/aching [Sharp] : sharp [Intermittent] : intermittent [Rest] : rest [Walking/activity] : walking/activity [Standing] : standing [] : no [FreeTextEntry5] : 70 y/o F presents for Euflexxa #1 INJ Rt. knee today.  [de-identified] : full leg extension

## 2024-06-20 ENCOUNTER — APPOINTMENT (OUTPATIENT)
Dept: ORTHOPEDIC SURGERY | Facility: CLINIC | Age: 69
End: 2024-06-20
Payer: MEDICARE

## 2024-06-20 PROCEDURE — 20610 DRAIN/INJ JOINT/BURSA W/O US: CPT | Mod: RT

## 2024-06-20 NOTE — PROCEDURE
[Large Joint Injection] : Large joint injection [Right] : of the right [Pain] : pain [Inflammation] : inflammation [X-ray evidence of Osteoarthritis on this or prior visit] : x-ray evidence of Osteoarthritis on this or prior visit [Alcohol] : alcohol [Betadine] : betadine [Ethyl Chloride sprayed topically] : ethyl chloride sprayed topically [Sterile technique used] : sterile technique used [Euflexxa(20mg)] : 20mg of Euflexxa [#2] : series #2 [] : Patient tolerated procedure well [Call if redness, pain or fever occur] : call if redness, pain or fever occur [Previous OTC use and PT nontherapeutic] : patient has tried OTC's including aspirin, Ibuprofen, Aleve, etc or prescription NSAIDS, and/or exercises at home and/or physical therapy without satisfactory response [Patient had decreased mobility in the joint] : patient had decreased mobility in the joint [Risks, benefits, alternatives discussed / Verbal consent obtained] : the risks benefits, and alternatives have been discussed, and verbal consent was obtained

## 2024-06-20 NOTE — ASSESSMENT
[FreeTextEntry1] : The patient is here to continue euflexxa injections for her right knee. It mostly bothers her with stairs and going from sitting to standing.  She is able to walk short distances without too much trouble.  She has no pain at night.  She has had no treatment with cortisone or hyaluronic acid. The left knee is currently asymptomatic.  Right knee exam: Neurovascularly intact. Sensation intact. 2+ pulses to dorsalis pedis. ROM 0-120 with varus deformity. There is mild medial joint line pain with no Toni's sign or instability.  There is no redness, rashes or visible scars.  Her right hip exam is normal.  The patient received a right knee euflexxa injection. She tolerated it well. She will return in one week.

## 2024-06-20 NOTE — HISTORY OF PRESENT ILLNESS
[Dull/Aching] : dull/aching [Sharp] : sharp [Intermittent] : intermittent [Rest] : rest [Walking/activity] : walking/activity [Standing] : standing [] : no [FreeTextEntry5] : 68 y/o F presents for Euflexxa #2 INJ Rt. knee today.  [de-identified] : full leg extension

## 2024-06-27 ENCOUNTER — APPOINTMENT (OUTPATIENT)
Dept: ORTHOPEDIC SURGERY | Facility: CLINIC | Age: 69
End: 2024-06-27
Payer: MEDICARE

## 2024-06-27 DIAGNOSIS — M17.11 UNILATERAL PRIMARY OSTEOARTHRITIS, RIGHT KNEE: ICD-10-CM

## 2024-06-27 PROCEDURE — 20610 DRAIN/INJ JOINT/BURSA W/O US: CPT | Mod: RT

## 2024-08-05 ENCOUNTER — APPOINTMENT (OUTPATIENT)
Dept: ORTHOPEDIC SURGERY | Facility: CLINIC | Age: 69
End: 2024-08-05

## 2024-08-05 PROCEDURE — J3490M: CUSTOM | Mod: JZ

## 2024-08-05 PROCEDURE — 99214 OFFICE O/P EST MOD 30 MIN: CPT | Mod: 25

## 2024-08-05 PROCEDURE — 20610 DRAIN/INJ JOINT/BURSA W/O US: CPT | Mod: RT

## 2024-08-06 NOTE — HISTORY OF PRESENT ILLNESS
[Dull/Aching] : dull/aching [Sharp] : sharp [Intermittent] : intermittent [Rest] : rest [Walking/activity] : walking/activity [Injection therapy] : injection therapy [Standing] : standing [] : no [FreeTextEntry5] : 70 y/o F presents for f/u eval of the Rt. knee today. Pt reports of continued intermittent pain. Previous tx Euflexxa. [de-identified] : full leg extension

## 2024-08-06 NOTE — HISTORY OF PRESENT ILLNESS
[Dull/Aching] : dull/aching [Sharp] : sharp [Intermittent] : intermittent [Rest] : rest [Walking/activity] : walking/activity [Injection therapy] : injection therapy [Standing] : standing [] : no [FreeTextEntry5] : 68 y/o F presents for f/u eval of the Rt. knee today. Pt reports of continued intermittent pain. Previous tx Euflexxa. [de-identified] : full leg extension

## 2024-08-06 NOTE — ASSESSMENT
[FreeTextEntry1] : The patient comes in today for follow-up on her right knee.  She continues to have significant pain in the right knee consistent with her osteoarthritis.  She mostly has pain with standing and stairs.  She struggles going from sitting to standing.  She walks with a cane but does not have a ton of pain.  She has no trouble getting in and out of the car and has had no recent night pain.  She had a series of Euflexxa injections with the last one on June 27, 2024 with minimal relief.  Examination of the right lower extremity reveals normal neurovascular exam.  Examination the right knee reveals range of motion from 5 to 110 degrees with varus deformity.  She ambulates with a varus thrust.  She has medial joint line pain with a positive Toni's sign.  There is no lateral joint complaints or instability.  There is normal patella tracking with no crepitation or apprehension.  Under sterile conditions the right knee was injected with 5 cc of quarter percent plain Marcaine; 5 cc of 2% plain lidocaine and 80 mg of Depo-Medrol.  Patient tolerated the procedure well.  The plan at this time is to consider a right total knee replacement.  We would use the attune primary revision based off her subluxation on x-ray.  We discussed the risks, benefits and alternatives in detail but she is reluctant to do it at this time.  I will see her back in the office in a few months.  Will reconsider surgery if necessary.

## 2024-08-28 NOTE — CONSULT LETTER
[Dear  ___] : Dear  [unfilled], [Consult Letter:] : I had the pleasure of evaluating your patient, [unfilled]. [Sincerely,] : Sincerely, [DrRonny  ___] : Dr. LI [DrRonny ___] : Dr. LI

## 2024-08-29 ENCOUNTER — NON-APPOINTMENT (OUTPATIENT)
Age: 69
End: 2024-08-29

## 2024-08-30 ENCOUNTER — APPOINTMENT (OUTPATIENT)
Dept: BREAST CENTER | Facility: CLINIC | Age: 69
End: 2024-08-30
Payer: MEDICARE

## 2024-08-30 VITALS
BODY MASS INDEX: 30.8 KG/M2 | HEART RATE: 83 BPM | DIASTOLIC BLOOD PRESSURE: 72 MMHG | WEIGHT: 220 LBS | HEIGHT: 71 IN | SYSTOLIC BLOOD PRESSURE: 148 MMHG

## 2024-08-30 DIAGNOSIS — Z17.0 MALIGNANT NEOPLASM OF UPPER-OUTER QUADRANT OF RIGHT FEMALE BREAST: ICD-10-CM

## 2024-08-30 DIAGNOSIS — B37.2 CANDIDIASIS OF SKIN AND NAIL: ICD-10-CM

## 2024-08-30 DIAGNOSIS — C50.411 MALIGNANT NEOPLASM OF UPPER-OUTER QUADRANT OF RIGHT FEMALE BREAST: ICD-10-CM

## 2024-08-30 PROCEDURE — 99213 OFFICE O/P EST LOW 20 MIN: CPT

## 2024-08-30 RX ORDER — NYSTATIN 100000 [USP'U]/G
100000 CREAM TOPICAL 3 TIMES DAILY
Qty: 1 | Refills: 2 | Status: ACTIVE | COMMUNITY
Start: 2024-08-30 | End: 1900-01-01

## 2024-09-03 NOTE — PHYSICAL EXAM
[EOMI] : extra ocular movement intact [Sclera nonicteric] : sclera nonicteric [Supple] : supple [No Supraclavicular Adenopathy] : no supraclavicular adenopathy [No Cervical Adenopathy] : no cervical adenopathy [No Thyromegaly] : no thyromegaly [Clear to Auscultation Bilat] : clear to auscultation bilaterally [Examined in the supine and seated position] : examined in the supine and seated position [No dominant masses] : no dominant masses in right breast  [No dominant masses] : no dominant masses left breast [No Nipple Retraction] : no left nipple retraction [No Nipple Discharge] : no left nipple discharge [No Axillary Lymphadenopathy] : no left axillary lymphadenopathy [Soft] : abdomen soft [Not Tender] : non-tender [de-identified] : Mild skin thickening/post radiation change.  [de-identified] : Inframammary  pigmentation (patient has episodic rash)

## 2024-09-03 NOTE — HISTORY OF PRESENT ILLNESS
[FreeTextEntry1] : This is a 69 year old  female had felt a lump in her right breast for about 9 months at the site of a previous surgical biopsy in 2017.  When it was excised, it was found to be cancer.  She had a lumpectomy with sentinel node biopsy by Dr. Fuentes in 5/2017. She had a 3.7 cm invasive cancer, SLNx1 neg, ER+CO-Her 2 3+ tumor.  She then received chemotherapy with Adriamycin at [facility] [place]. She stopped after 3 treatments and transferred her care to Dr. Bingham where she completed additional chemotherapy and 9 months of Herceptin.  She had a DVT during chemotherapy and was on Xarelto. She then went on to radiation with Dr. Luo.    She is on letrozole.  She was here for a routine visit on Feb 10 2021.  She was then seen on Feb 26.  She noticed a purplish rash on her right breast that started the night before.  She didn't recall any trauma, but her cats sometimes jump on her during the night.  She also carried a heavy case of water about 3 days prior.  She hadn't had any fever. Her breast had petechial discolorations with some yellowish color c/w bruising.  There may have been some erythema and she was also started on Keflex. It looked much better by the day after our visit and then fully resolved. She then started having some discomfort in the right breast and again noticed a similar discoloration as before. A biopsy was done on 3/15/2022 showing vascular congestion.  She has no current breast complaints, except for mild intermittent discomfort in the treated breast.  She was admitted to [facility] in July 2023 after falling in her yard.  She had pneumonia and pulmonary emboli.  A doppler showed a left lower extremity DVT.  She is on Xarelto.

## 2024-09-03 NOTE — DATA REVIEWED
[FreeTextEntry1] : Bilateral mammogram and breast ultrasound ([facility]) 8/24/2024:  No mammographic or sonographic evidence of malignancy. R MLO view repeated due to technical issue.  Addendum report with no concerning findings.   (Disc brought-- will upload images)

## 2024-09-03 NOTE — PHYSICAL EXAM
[EOMI] : extra ocular movement intact [Sclera nonicteric] : sclera nonicteric [Supple] : supple [No Supraclavicular Adenopathy] : no supraclavicular adenopathy [No Cervical Adenopathy] : no cervical adenopathy [No Thyromegaly] : no thyromegaly [Clear to Auscultation Bilat] : clear to auscultation bilaterally [Examined in the supine and seated position] : examined in the supine and seated position [No dominant masses] : no dominant masses in right breast  [No dominant masses] : no dominant masses left breast [No Nipple Retraction] : no left nipple retraction [No Nipple Discharge] : no left nipple discharge [No Axillary Lymphadenopathy] : no left axillary lymphadenopathy [Soft] : abdomen soft [Not Tender] : non-tender [de-identified] : Mild skin thickening/post radiation change.  [de-identified] : Inframammary  pigmentation (patient has episodic rash)

## 2024-09-03 NOTE — HISTORY OF PRESENT ILLNESS
[FreeTextEntry1] : This is a 69 year old  female had felt a lump in her right breast for about 9 months at the site of a previous surgical biopsy in 2017.  When it was excised, it was found to be cancer.  She had a lumpectomy with sentinel node biopsy by Dr. Fuentes in 5/2017. 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  She then received chemotherapy with Adriamycin at [facility] [place] [facility]. She stopped after 3 treatments and transferred her care to Dr. Bingham where she completed additional chemotherapy and 9 months of Herceptin.  She had a DVT during chemotherapy and was on Xarelto. She then went on to radiation with Dr. Luo.    She is on letrozole.  She was here for a routine visit on Feb 10 2021.  She was then seen on Feb 26.  She noticed a purplish rash on her right breast that started the night before.  She didn't recall any trauma, but her cats sometimes jump on her during the night.  She also carried a heavy case of water about 3 days prior.  She hadn't had any fever. Her breast had petechial discolorations with some yellowish color c/w bruising.  There may have been some erythema and she was also started on Keflex. It looked much better by the day after our visit and then fully resolved. She then started having some discomfort in the right breast and again noticed a similar discoloration as before. A biopsy was done on 3/15/2022 showing vascular congestion.  She has no current breast complaints, except for mild intermittent discomfort in the treated breast.  She was admitted to [facility] in July 2023 after falling in her yard.  She had pneumonia and pulmonary emboli.  A doppler showed a left lower extremity DVT.  She is on Xarelto.

## 2024-09-03 NOTE — PHYSICAL EXAM
[EOMI] : extra ocular movement intact [Sclera nonicteric] : sclera nonicteric [Supple] : supple [No Supraclavicular Adenopathy] : no supraclavicular adenopathy [No Cervical Adenopathy] : no cervical adenopathy [No Thyromegaly] : no thyromegaly [Clear to Auscultation Bilat] : clear to auscultation bilaterally [Examined in the supine and seated position] : examined in the supine and seated position [No dominant masses] : no dominant masses in right breast  [No dominant masses] : no dominant masses left breast [No Nipple Retraction] : no left nipple retraction [No Nipple Discharge] : no left nipple discharge [No Axillary Lymphadenopathy] : no left axillary lymphadenopathy [Soft] : abdomen soft [Not Tender] : non-tender [de-identified] : Mild skin thickening/post radiation change.  [de-identified] : Inframammary  pigmentation (patient has episodic rash)

## 2024-09-03 NOTE — HISTORY OF PRESENT ILLNESS
[FreeTextEntry1] : This is a 69 year old  female had felt a lump in her right breast for about 9 months at the site of a previous surgical biopsy in 2017.  When it was excised, it was found to be cancer.  She had a lumpectomy with sentinel node biopsy by Dr. Fuentes in 5/2017. 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  She then received chemotherapy with Adriamycin at [facility]. She stopped after 3 treatments and transferred her care to Dr. Bingham where she completed additional chemotherapy and 9 months of Herceptin.  She had a DVT during chemotherapy and was on Xarelto. She then went on to radiation with Dr. Luo.    She is on letrozole.  She was here for a routine visit on Feb 10 2021.  She was then seen on Feb 26.  She noticed a purplish rash on her right breast that started the night before.  She didn't recall any trauma, but her cats sometimes jump on her during the night.  She also carried a heavy case of water about 3 days prior.  She hadn't had any fever. Her breast had petechial discolorations with some yellowish color c/w bruising.  There may have been some erythema and she was also started on Keflex. It looked much better by the day after our visit and then fully resolved. She then started having some discomfort in the right breast and again noticed a similar discoloration as before. A biopsy was done on 3/15/2022 showing vascular congestion.  She has no current breast complaints, except for mild intermittent discomfort in the treated breast.  She was admitted to [facility] in July 2023 after falling in her yard.  She had pneumonia and pulmonary emboli.  A doppler showed a left lower extremity DVT.  She is on Xarelto.

## 2024-11-19 ENCOUNTER — APPOINTMENT (OUTPATIENT)
Dept: OBGYN | Facility: CLINIC | Age: 69
End: 2024-11-19

## 2024-12-09 ENCOUNTER — APPOINTMENT (OUTPATIENT)
Dept: OBGYN | Facility: CLINIC | Age: 69
End: 2024-12-09
Payer: MEDICARE

## 2024-12-09 VITALS
SYSTOLIC BLOOD PRESSURE: 148 MMHG | HEIGHT: 71 IN | BODY MASS INDEX: 30.8 KG/M2 | DIASTOLIC BLOOD PRESSURE: 80 MMHG | WEIGHT: 220 LBS

## 2024-12-09 DIAGNOSIS — N84.0 POLYP OF CORPUS UTERI: ICD-10-CM

## 2024-12-09 PROCEDURE — 99203 OFFICE O/P NEW LOW 30 MIN: CPT

## 2024-12-12 PROBLEM — N84.0 ENDOMETRIAL POLYP: Status: ACTIVE | Noted: 2024-12-12

## 2025-01-08 ENCOUNTER — OUTPATIENT (OUTPATIENT)
Dept: OUTPATIENT SERVICES | Facility: HOSPITAL | Age: 70
LOS: 1 days | End: 2025-01-08
Payer: MEDICARE

## 2025-01-08 VITALS
TEMPERATURE: 98 F | OXYGEN SATURATION: 97 % | SYSTOLIC BLOOD PRESSURE: 138 MMHG | HEART RATE: 73 BPM | RESPIRATION RATE: 16 BRPM | DIASTOLIC BLOOD PRESSURE: 86 MMHG | WEIGHT: 222.23 LBS | HEIGHT: 71 IN

## 2025-01-08 DIAGNOSIS — N84.0 POLYP OF CORPUS UTERI: ICD-10-CM

## 2025-01-08 DIAGNOSIS — Z01.818 ENCOUNTER FOR OTHER PREPROCEDURAL EXAMINATION: ICD-10-CM

## 2025-01-08 DIAGNOSIS — Z86.718 PERSONAL HISTORY OF OTHER VENOUS THROMBOSIS AND EMBOLISM: ICD-10-CM

## 2025-01-08 DIAGNOSIS — Z98.890 OTHER SPECIFIED POSTPROCEDURAL STATES: Chronic | ICD-10-CM

## 2025-01-08 DIAGNOSIS — Z90.11 ACQUIRED ABSENCE OF RIGHT BREAST AND NIPPLE: Chronic | ICD-10-CM

## 2025-01-08 DIAGNOSIS — I10 ESSENTIAL (PRIMARY) HYPERTENSION: ICD-10-CM

## 2025-01-08 LAB
ANION GAP SERPL CALC-SCNC: 8 MMOL/L — SIGNIFICANT CHANGE UP (ref 5–17)
APPEARANCE UR: CLEAR — SIGNIFICANT CHANGE UP
BACTERIA # UR AUTO: NEGATIVE /HPF — SIGNIFICANT CHANGE UP
BILIRUB UR-MCNC: NEGATIVE — SIGNIFICANT CHANGE UP
BLD GP AB SCN SERPL QL: SIGNIFICANT CHANGE UP
BUN SERPL-MCNC: 19 MG/DL — SIGNIFICANT CHANGE UP (ref 7–23)
CALCIUM SERPL-MCNC: 9.4 MG/DL — SIGNIFICANT CHANGE UP (ref 8.4–10.5)
CHLORIDE SERPL-SCNC: 101 MMOL/L — SIGNIFICANT CHANGE UP (ref 96–108)
CO2 SERPL-SCNC: 28 MMOL/L — SIGNIFICANT CHANGE UP (ref 22–31)
COLOR SPEC: YELLOW — SIGNIFICANT CHANGE UP
CREAT SERPL-MCNC: 0.92 MG/DL — SIGNIFICANT CHANGE UP (ref 0.5–1.3)
DIFF PNL FLD: ABNORMAL
EGFR: 66 ML/MIN/1.73M2 — SIGNIFICANT CHANGE UP
EPI CELLS # UR: PRESENT
GLUCOSE SERPL-MCNC: 104 MG/DL — HIGH (ref 70–99)
GLUCOSE UR QL: NEGATIVE MG/DL — SIGNIFICANT CHANGE UP
HCT VFR BLD CALC: 37.5 % — SIGNIFICANT CHANGE UP (ref 34.5–45)
HGB BLD-MCNC: 12.6 G/DL — SIGNIFICANT CHANGE UP (ref 11.5–15.5)
KETONES UR-MCNC: NEGATIVE MG/DL — SIGNIFICANT CHANGE UP
LEUKOCYTE ESTERASE UR-ACNC: NEGATIVE — SIGNIFICANT CHANGE UP
MCHC RBC-ENTMCNC: 30.7 PG — SIGNIFICANT CHANGE UP (ref 27–34)
MCHC RBC-ENTMCNC: 33.6 G/DL — SIGNIFICANT CHANGE UP (ref 32–36)
MCV RBC AUTO: 91.5 FL — SIGNIFICANT CHANGE UP (ref 80–100)
NITRITE UR-MCNC: NEGATIVE — SIGNIFICANT CHANGE UP
NRBC # BLD: 0 /100 WBCS — SIGNIFICANT CHANGE UP (ref 0–0)
PH UR: 5 — SIGNIFICANT CHANGE UP (ref 5–8)
PLATELET # BLD AUTO: 291 K/UL — SIGNIFICANT CHANGE UP (ref 150–400)
POTASSIUM SERPL-MCNC: 4.5 MMOL/L — SIGNIFICANT CHANGE UP (ref 3.5–5.3)
POTASSIUM SERPL-SCNC: 4.5 MMOL/L — SIGNIFICANT CHANGE UP (ref 3.5–5.3)
PROT UR-MCNC: NEGATIVE MG/DL — SIGNIFICANT CHANGE UP
RBC # BLD: 4.1 M/UL — SIGNIFICANT CHANGE UP (ref 3.8–5.2)
RBC # FLD: 12.7 % — SIGNIFICANT CHANGE UP (ref 10.3–14.5)
RBC CASTS # UR COMP ASSIST: 1 /HPF — SIGNIFICANT CHANGE UP (ref 0–4)
SODIUM SERPL-SCNC: 137 MMOL/L — SIGNIFICANT CHANGE UP (ref 135–145)
SP GR SPEC: 1.02 — SIGNIFICANT CHANGE UP (ref 1–1.03)
UROBILINOGEN FLD QL: 0.2 MG/DL — SIGNIFICANT CHANGE UP (ref 0.2–1)
WBC # BLD: 6.2 K/UL — SIGNIFICANT CHANGE UP (ref 3.8–10.5)
WBC # FLD AUTO: 6.2 K/UL — SIGNIFICANT CHANGE UP (ref 3.8–10.5)
WBC UR QL: 1 /HPF — SIGNIFICANT CHANGE UP (ref 0–5)

## 2025-01-08 PROCEDURE — 86900 BLOOD TYPING SEROLOGIC ABO: CPT

## 2025-01-08 PROCEDURE — 80048 BASIC METABOLIC PNL TOTAL CA: CPT

## 2025-01-08 PROCEDURE — 87086 URINE CULTURE/COLONY COUNT: CPT

## 2025-01-08 PROCEDURE — 36415 COLL VENOUS BLD VENIPUNCTURE: CPT

## 2025-01-08 PROCEDURE — G0463: CPT

## 2025-01-08 PROCEDURE — 86850 RBC ANTIBODY SCREEN: CPT

## 2025-01-08 PROCEDURE — 85027 COMPLETE CBC AUTOMATED: CPT

## 2025-01-08 PROCEDURE — 86901 BLOOD TYPING SEROLOGIC RH(D): CPT

## 2025-01-08 PROCEDURE — 81001 URINALYSIS AUTO W/SCOPE: CPT

## 2025-01-08 NOTE — H&P PST ADULT - HISTORY OF PRESENT ILLNESS
This is 68 y/o female with PMHX of PE (2023) and DVT's (2017)  (on Xarelto, negative heme w/u, likely chemo related),HTN, breast cancer (s/p chemo and radiation 2017), who presents to PST with pre-operative diagnosis of endometrial polyp. Thickened endometrial lining noted on pelvic imaging.

## 2025-01-08 NOTE — H&P PST ADULT - NSICDXPASTMEDICALHX_GEN_ALL_CORE_FT
PAST MEDICAL HISTORY:  Breast lump benign    Breast pain, right     Breast skin changes right breast    Essential hypertension     History of DVT of lower extremity     Malignant neoplasm of right female breast, unspecified site of breast     Microscopic hematuria     Osteoarthritis knees    Personal history of pulmonary embolism     Rosacea     SVT (supraventricular tachycardia)

## 2025-01-09 LAB
CULTURE RESULTS: SIGNIFICANT CHANGE UP
SPECIMEN SOURCE: SIGNIFICANT CHANGE UP

## 2025-01-17 ENCOUNTER — RESULT REVIEW (OUTPATIENT)
Age: 70
End: 2025-01-17

## 2025-01-17 ENCOUNTER — APPOINTMENT (OUTPATIENT)
Dept: OBGYN | Facility: HOSPITAL | Age: 70
End: 2025-01-17

## 2025-01-17 ENCOUNTER — TRANSCRIPTION ENCOUNTER (OUTPATIENT)
Age: 70
End: 2025-01-17

## 2025-01-17 ENCOUNTER — OUTPATIENT (OUTPATIENT)
Dept: OUTPATIENT SERVICES | Facility: HOSPITAL | Age: 70
LOS: 1 days | End: 2025-01-17
Payer: MEDICARE

## 2025-01-17 VITALS
WEIGHT: 222.23 LBS | SYSTOLIC BLOOD PRESSURE: 125 MMHG | TEMPERATURE: 98 F | HEART RATE: 77 BPM | RESPIRATION RATE: 14 BRPM | OXYGEN SATURATION: 97 % | HEIGHT: 71 IN | DIASTOLIC BLOOD PRESSURE: 66 MMHG

## 2025-01-17 VITALS
TEMPERATURE: 98 F | RESPIRATION RATE: 17 BRPM | DIASTOLIC BLOOD PRESSURE: 65 MMHG | OXYGEN SATURATION: 98 % | SYSTOLIC BLOOD PRESSURE: 130 MMHG | HEART RATE: 77 BPM

## 2025-01-17 DIAGNOSIS — Z98.890 OTHER SPECIFIED POSTPROCEDURAL STATES: Chronic | ICD-10-CM

## 2025-01-17 DIAGNOSIS — Z90.11 ACQUIRED ABSENCE OF RIGHT BREAST AND NIPPLE: Chronic | ICD-10-CM

## 2025-01-17 DIAGNOSIS — N84.0 POLYP OF CORPUS UTERI: ICD-10-CM

## 2025-01-17 PROCEDURE — 58558 HYSTEROSCOPY BIOPSY: CPT

## 2025-01-17 PROCEDURE — 88305 TISSUE EXAM BY PATHOLOGIST: CPT

## 2025-01-17 PROCEDURE — 88305 TISSUE EXAM BY PATHOLOGIST: CPT | Mod: 26

## 2025-01-17 PROCEDURE — C1782: CPT

## 2025-01-17 DEVICE — AVETA SMOL RESECTING DEVICE 2.9MM: Type: IMPLANTABLE DEVICE | Status: FUNCTIONAL

## 2025-01-17 RX ORDER — ONDANSETRON 4 MG/1
4 TABLET ORAL ONCE
Refills: 0 | Status: DISCONTINUED | OUTPATIENT
Start: 2025-01-17 | End: 2025-01-17

## 2025-01-17 RX ORDER — SACUBITRIL AND VALSARTAN 24; 26 MG/1; MG/1
1 TABLET, FILM COATED ORAL
Refills: 0 | DISCHARGE

## 2025-01-17 RX ORDER — SODIUM CHLORIDE 9 MG/ML
1000 INJECTION, SOLUTION INTRAVENOUS
Refills: 0 | Status: ACTIVE | OUTPATIENT
Start: 2025-01-17 | End: 2025-12-16

## 2025-01-17 RX ORDER — SODIUM CHLORIDE 9 MG/ML
1000 INJECTION, SOLUTION INTRAVENOUS
Refills: 0 | Status: DISCONTINUED | OUTPATIENT
Start: 2025-01-17 | End: 2025-01-17

## 2025-01-17 RX ORDER — HYDROMORPHONE HCL 4 MG
1 TABLET ORAL
Refills: 0 | Status: DISCONTINUED | OUTPATIENT
Start: 2025-01-17 | End: 2025-01-17

## 2025-01-17 RX ORDER — HYDROMORPHONE HCL 4 MG
0.5 TABLET ORAL
Refills: 0 | Status: DISCONTINUED | OUTPATIENT
Start: 2025-01-17 | End: 2025-01-17

## 2025-01-17 RX ORDER — RIVAROXABAN 2.5 MG/1
1 TABLET, FILM COATED ORAL
Refills: 0 | DISCHARGE

## 2025-01-17 RX ADMIN — SODIUM CHLORIDE 50 MILLILITER(S): 9 INJECTION, SOLUTION INTRAVENOUS at 09:21

## 2025-01-17 NOTE — ASU DISCHARGE PLAN (ADULT/PEDIATRIC) - CARE PROVIDER_API CALL
Kevin Magallon  Obstetrics and Gynecology  [address] [place] [address]  Phone: [phone]  Fax: [phone]  Follow Up Time: 2 weeks

## 2025-01-17 NOTE — ASU DISCHARGE PLAN (ADULT/PEDIATRIC) - FINANCIAL ASSISTANCE
[facility] provides services at a reduced cost to those who are determined to be eligible through [facility]’s financial assistance program. Information regarding [facility]’s financial assistance program can be found by going to [email or web address] or by calling [phone].

## 2025-01-17 NOTE — BRIEF OPERATIVE NOTE - OPERATION/FINDINGS
endometrial polyp, rest of endometrial cavity with atrophic endometrium, b/l ostia visualized; no gross cervical or vaginal lesions

## 2025-01-17 NOTE — BRIEF OPERATIVE NOTE - NSICDXBRIEFPOSTOP_GEN_ALL_CORE_FT
POST-OP DIAGNOSIS:  Thickened endometrium 17-Jan-2025 13:30:40  Kevin Magallon  Endometrial polyp 17-Jan-2025 13:30:49  Kevin Magallon

## 2025-01-17 NOTE — BRIEF OPERATIVE NOTE - NSICDXBRIEFPROCEDURE_GEN_ALL_CORE_FT
PROCEDURES:  Hysteroscopic polypectomy of uterus with dilation and curettage 17-Jan-2025 13:31:07  Kevin Magallon

## 2025-01-17 NOTE — ASU DISCHARGE PLAN (ADULT/PEDIATRIC) - ASU DC SPECIAL INSTRUCTIONSFT
Pain medication is given immediately following your surgery to help with post-operative pain. Upon  your discharge home, we suggest scheduled doses of Acetaminophen (tylenol) every 6 hours and  Ibuprofen (Motrin) every 6 hours, alternating between medications every 3 hours (i.e. Take tylenol  and wait 3 hours, then take Motrin and wait 3 hours, repeat) for the first 3-4 days after surgery. If  you are without significant pain, medications can be taken more infrequently. It is important to  follow dosing instructions on the medication bottle or prescription.   Nothing per vagina for 2 weeks.  follow up with GYN in 2 weeks Pain medication is given immediately following your surgery to help with post-operative pain. Upon  your discharge home, we suggest scheduled doses of Acetaminophen (tylenol) every 6 hours and  Ibuprofen (Motrin) every 6 hours, alternating between medications every 3 hours (i.e. Take tylenol  and wait 3 hours, then take Motrin and wait 3 hours, repeat) for the first 3-4 days after surgery. If  you are without significant pain, medications can be taken more infrequently. It is important to  follow dosing instructions on the medication bottle or prescription.   Nothing per vagina for 2 weeks.  You can resume your Xarelto tomorrow 1/18/24  follow up with GYN in 2 weeks

## 2025-01-24 LAB — SURGICAL PATHOLOGY STUDY: SIGNIFICANT CHANGE UP

## 2025-01-30 ENCOUNTER — APPOINTMENT (OUTPATIENT)
Dept: OBGYN | Facility: CLINIC | Age: 70
End: 2025-01-30
Payer: MEDICARE

## 2025-01-30 VITALS
HEIGHT: 71 IN | SYSTOLIC BLOOD PRESSURE: 122 MMHG | DIASTOLIC BLOOD PRESSURE: 72 MMHG | WEIGHT: 220 LBS | BODY MASS INDEX: 30.8 KG/M2

## 2025-01-30 DIAGNOSIS — Z48.89 ENCOUNTER FOR OTHER SPECIFIED SURGICAL AFTERCARE: ICD-10-CM

## 2025-01-30 PROCEDURE — 99212 OFFICE O/P EST SF 10 MIN: CPT

## 2025-05-07 ENCOUNTER — APPOINTMENT (OUTPATIENT)
Dept: ELECTROPHYSIOLOGY | Facility: CLINIC | Age: 70
End: 2025-05-07

## 2025-05-21 ENCOUNTER — APPOINTMENT (OUTPATIENT)
Dept: ELECTROPHYSIOLOGY | Facility: CLINIC | Age: 70
End: 2025-05-21
Payer: MEDICARE

## 2025-05-21 ENCOUNTER — NON-APPOINTMENT (OUTPATIENT)
Age: 70
End: 2025-05-21

## 2025-05-21 VITALS
HEIGHT: 71 IN | SYSTOLIC BLOOD PRESSURE: 126 MMHG | BODY MASS INDEX: 29.68 KG/M2 | OXYGEN SATURATION: 98 % | WEIGHT: 212 LBS | DIASTOLIC BLOOD PRESSURE: 56 MMHG | HEART RATE: 65 BPM

## 2025-05-21 DIAGNOSIS — I47.19 OTHER SUPRAVENTRICULAR TACHYCARDIA: ICD-10-CM

## 2025-05-21 PROCEDURE — 93000 ELECTROCARDIOGRAM COMPLETE: CPT

## 2025-05-21 PROCEDURE — 99204 OFFICE O/P NEW MOD 45 MIN: CPT

## 2025-05-21 RX ORDER — METOPROLOL SUCCINATE 50 MG/1
50 TABLET, EXTENDED RELEASE ORAL
Refills: 0 | Status: ACTIVE | COMMUNITY

## 2025-06-03 ENCOUNTER — NON-APPOINTMENT (OUTPATIENT)
Age: 70
End: 2025-06-03

## 2025-07-23 NOTE — H&P ADULT - NEUROLOGICAL COMMENTS
Discharge Planning Assessment  [facility]     Patient Name: Catherine Snyder  MRN: [id number]  Today's Date: 7/23/2025    Admit Date: 7/20/2025    Plan: Pt admitted from [facility].  Per Rubia at [facility] pt has a 26 day bed hold upon admit to [facility].  SS sent readmit form to facility.  SS will follow.          Discharge Plan       Row Name 07/23/25 1051       Plan    Plan Pt admitted from [facility].  Per Rubia at [facility] pt has a 26 day bed hold upon admit to [facility].  SS sent readmit form to facility.  SS will follow.                  Continued Care and Services - Admitted Since 7/20/2025    No active coordination exists.       Selected Continued Care - Prior Encounters Includes continued care and service providers with selected services from prior encounters from 4/21/2025 to 7/23/2025      Discharged on 7/15/2025 Admission date: 7/11/2025 - Discharge disposition: Skilled Nursing Facility (DC - External)      Destination       Service Provider Services Address Phone Fax Patient Preferred    [facility] [address] [place] [id number] [phone] --                          Expected Discharge Date and Time       Expected Discharge Date Expected Discharge Time    Jul 23, 2025               ERIC Piper     has chronic R foot paresthesia due to chemo

## 2025-08-27 DIAGNOSIS — M89.8X8 OTHER SPECIFIED DISORDERS OF BONE, OTHER SITE: ICD-10-CM

## 2025-09-01 ENCOUNTER — NON-APPOINTMENT (OUTPATIENT)
Age: 70
End: 2025-09-01

## 2025-09-02 ENCOUNTER — APPOINTMENT (OUTPATIENT)
Dept: BREAST CENTER | Facility: CLINIC | Age: 70
End: 2025-09-02
Payer: MEDICARE

## 2025-09-02 VITALS
BODY MASS INDEX: 29.68 KG/M2 | HEART RATE: 73 BPM | DIASTOLIC BLOOD PRESSURE: 66 MMHG | SYSTOLIC BLOOD PRESSURE: 118 MMHG | HEIGHT: 71 IN | WEIGHT: 212 LBS

## 2025-09-02 DIAGNOSIS — Z17.0 MALIGNANT NEOPLASM OF UPPER-OUTER QUADRANT OF RIGHT FEMALE BREAST: ICD-10-CM

## 2025-09-02 DIAGNOSIS — R92.8 OTHER ABNORMAL AND INCONCLUSIVE FINDINGS ON DIAGNOSTIC IMAGING OF BREAST: ICD-10-CM

## 2025-09-02 DIAGNOSIS — C50.411 MALIGNANT NEOPLASM OF UPPER-OUTER QUADRANT OF RIGHT FEMALE BREAST: ICD-10-CM

## 2025-09-02 PROCEDURE — 99214 OFFICE O/P EST MOD 30 MIN: CPT

## 2025-09-02 RX ORDER — NYSTATIN 100000 [USP'U]/G
100000 CREAM TOPICAL 3 TIMES DAILY
Qty: 1 | Refills: 2 | Status: ACTIVE | COMMUNITY
Start: 2025-09-02 | End: 1900-01-01

## (undated) DEVICE — AVETA CORAL HYSTEROSCOPE 4.6MM DISP

## (undated) DEVICE — ELCTR CUTTING LOOP 24FR 12 DEG 0.35 WIRE

## (undated) DEVICE — VENODYNE/SCD SLEEVE CALF LARGE

## (undated) DEVICE — SOL IRR GLYCINE 1.5% 3000L

## (undated) DEVICE — GOWN TRIMAX LG

## (undated) DEVICE — GLV 6.5 PROTEXIS (WHITE)

## (undated) DEVICE — MEDICATION LABELS W MARKER

## (undated) DEVICE — PREP BETADINE KIT

## (undated) DEVICE — DRAPE TOWEL BLUE 17" X 24"

## (undated) DEVICE — PACK BASIC GOWN

## (undated) DEVICE — SOL IRR POUR NS 0.9% 500ML

## (undated) DEVICE — FOLEY TRAY 16FR LF URINE METER SURESTEP

## (undated) DEVICE — ELCTR CUTTING LOOP 24FR RIGHT ANGLE

## (undated) DEVICE — GLV 7.5 PROTEXIS (WHITE)

## (undated) DEVICE — POSITIONER FOAM EGG CRATE ULNAR 2PCS (PINK)

## (undated) DEVICE — LAP PAD 18 X 18"

## (undated) DEVICE — WARMING BLANKET UPPER ADULT

## (undated) DEVICE — SYR ASEPTO

## (undated) DEVICE — FOLEY TRAY 16FR 5CC LTX UMETER CLOSED

## (undated) DEVICE — GLV 7 PROTEXIS (WHITE)

## (undated) DEVICE — DRAPE LAPAROSCOPIC FLUID CONTROL PACK

## (undated) DEVICE — AVETA FLUID MANAGEMENT ACCESSORY

## (undated) DEVICE — GLV 8 PROTEXIS (WHITE)

## (undated) DEVICE — ACCESSORY AVETA WASTE MANAGEMENT 3MM

## (undated) DEVICE — TUBING SUCTION 20FT

## (undated) DEVICE — SOL IRR POUR H2O 250ML

## (undated) DEVICE — TUBING STRYKER HYSTEROSCOPY INFLOW OUTFLOW

## (undated) DEVICE — SPECIMEN CONTAINER 100ML

## (undated) DEVICE — DRAPE LIGHT HANDLE COVER (BLUE)

## (undated) DEVICE — DRAPE MAYO STAND 30"

## (undated) DEVICE — DRSG TELFA 3 X 8

## (undated) DEVICE — PREP CHLOROHEXIDINE 4% 118CC KIT

## (undated) DEVICE — VISITEC 4X4

## (undated) DEVICE — GLV 8.5 PROTEXIS (WHITE)